# Patient Record
Sex: MALE | Race: BLACK OR AFRICAN AMERICAN | NOT HISPANIC OR LATINO
[De-identification: names, ages, dates, MRNs, and addresses within clinical notes are randomized per-mention and may not be internally consistent; named-entity substitution may affect disease eponyms.]

---

## 2021-11-19 ENCOUNTER — TRANSCRIPTION ENCOUNTER (OUTPATIENT)
Age: 56
End: 2021-11-19

## 2022-03-03 ENCOUNTER — INPATIENT (INPATIENT)
Facility: HOSPITAL | Age: 57
LOS: 4 days | Discharge: HOME | End: 2022-03-08
Attending: PSYCHIATRY & NEUROLOGY | Admitting: PSYCHIATRY & NEUROLOGY
Payer: COMMERCIAL

## 2022-03-03 VITALS
DIASTOLIC BLOOD PRESSURE: 87 MMHG | OXYGEN SATURATION: 99 % | WEIGHT: 227.96 LBS | TEMPERATURE: 98 F | RESPIRATION RATE: 18 BRPM | HEART RATE: 80 BPM | SYSTOLIC BLOOD PRESSURE: 161 MMHG

## 2022-03-03 LAB
A1C WITH ESTIMATED AVERAGE GLUCOSE RESULT: 8.6 % — HIGH (ref 4–5.6)
ALBUMIN SERPL ELPH-MCNC: 4.6 G/DL — SIGNIFICANT CHANGE UP (ref 3.5–5.2)
ALP SERPL-CCNC: 63 U/L — SIGNIFICANT CHANGE UP (ref 30–115)
ALT FLD-CCNC: 27 U/L — SIGNIFICANT CHANGE UP (ref 0–41)
ANION GAP SERPL CALC-SCNC: 13 MMOL/L — SIGNIFICANT CHANGE UP (ref 7–14)
APTT BLD: 31.6 SEC — SIGNIFICANT CHANGE UP (ref 27–39.2)
AST SERPL-CCNC: 24 U/L — SIGNIFICANT CHANGE UP (ref 0–41)
BASOPHILS # BLD AUTO: 0.03 K/UL — SIGNIFICANT CHANGE UP (ref 0–0.2)
BASOPHILS NFR BLD AUTO: 0.6 % — SIGNIFICANT CHANGE UP (ref 0–1)
BILIRUB SERPL-MCNC: 0.5 MG/DL — SIGNIFICANT CHANGE UP (ref 0.2–1.2)
BUN SERPL-MCNC: 26 MG/DL — HIGH (ref 10–20)
CALCIUM SERPL-MCNC: 9.6 MG/DL — SIGNIFICANT CHANGE UP (ref 8.5–10.1)
CHLORIDE SERPL-SCNC: 104 MMOL/L — SIGNIFICANT CHANGE UP (ref 98–110)
CHOLEST SERPL-MCNC: 158 MG/DL — SIGNIFICANT CHANGE UP
CO2 SERPL-SCNC: 26 MMOL/L — SIGNIFICANT CHANGE UP (ref 17–32)
CREAT SERPL-MCNC: 1.5 MG/DL — SIGNIFICANT CHANGE UP (ref 0.7–1.5)
EGFR: 54 ML/MIN/1.73M2 — LOW
EOSINOPHIL # BLD AUTO: 0.12 K/UL — SIGNIFICANT CHANGE UP (ref 0–0.7)
EOSINOPHIL NFR BLD AUTO: 2.4 % — SIGNIFICANT CHANGE UP (ref 0–8)
ESTIMATED AVERAGE GLUCOSE: 200 MG/DL — HIGH (ref 68–114)
GLUCOSE BLDC GLUCOMTR-MCNC: 227 MG/DL — HIGH (ref 70–99)
GLUCOSE BLDC GLUCOMTR-MCNC: 240 MG/DL — HIGH (ref 70–99)
GLUCOSE SERPL-MCNC: 203 MG/DL — HIGH (ref 70–99)
HCT VFR BLD CALC: 46.1 % — SIGNIFICANT CHANGE UP (ref 42–52)
HDLC SERPL-MCNC: 57 MG/DL — SIGNIFICANT CHANGE UP
HGB BLD-MCNC: 14.1 G/DL — SIGNIFICANT CHANGE UP (ref 14–18)
IMM GRANULOCYTES NFR BLD AUTO: 0.8 % — HIGH (ref 0.1–0.3)
INR BLD: 0.95 RATIO — SIGNIFICANT CHANGE UP (ref 0.65–1.3)
IRON SATN MFR SERPL: 22 % — SIGNIFICANT CHANGE UP (ref 15–50)
IRON SATN MFR SERPL: 76 UG/DL — SIGNIFICANT CHANGE UP (ref 35–150)
LIPID PNL WITH DIRECT LDL SERPL: 82 MG/DL — SIGNIFICANT CHANGE UP
LYMPHOCYTES # BLD AUTO: 1.76 K/UL — SIGNIFICANT CHANGE UP (ref 1.2–3.4)
LYMPHOCYTES # BLD AUTO: 35.1 % — SIGNIFICANT CHANGE UP (ref 20.5–51.1)
MCHC RBC-ENTMCNC: 20.4 PG — LOW (ref 27–31)
MCHC RBC-ENTMCNC: 30.6 G/DL — LOW (ref 32–37)
MCV RBC AUTO: 66.6 FL — LOW (ref 80–94)
MONOCYTES # BLD AUTO: 0.58 K/UL — SIGNIFICANT CHANGE UP (ref 0.1–0.6)
MONOCYTES NFR BLD AUTO: 11.6 % — HIGH (ref 1.7–9.3)
NEUTROPHILS # BLD AUTO: 2.49 K/UL — SIGNIFICANT CHANGE UP (ref 1.4–6.5)
NEUTROPHILS NFR BLD AUTO: 49.5 % — SIGNIFICANT CHANGE UP (ref 42.2–75.2)
NON HDL CHOLESTEROL: 101 MG/DL — SIGNIFICANT CHANGE UP
NRBC # BLD: 0 /100 WBCS — SIGNIFICANT CHANGE UP (ref 0–0)
PLATELET # BLD AUTO: 216 K/UL — SIGNIFICANT CHANGE UP (ref 130–400)
POTASSIUM SERPL-MCNC: 4.5 MMOL/L — SIGNIFICANT CHANGE UP (ref 3.5–5)
POTASSIUM SERPL-SCNC: 4.5 MMOL/L — SIGNIFICANT CHANGE UP (ref 3.5–5)
PROT SERPL-MCNC: 7.5 G/DL — SIGNIFICANT CHANGE UP (ref 6–8)
PROTHROM AB SERPL-ACNC: 10.9 SEC — SIGNIFICANT CHANGE UP (ref 9.95–12.87)
RBC # BLD: 6.92 M/UL — HIGH (ref 4.7–6.1)
RBC # FLD: 18.2 % — HIGH (ref 11.5–14.5)
SARS-COV-2 RNA SPEC QL NAA+PROBE: SIGNIFICANT CHANGE UP
SODIUM SERPL-SCNC: 143 MMOL/L — SIGNIFICANT CHANGE UP (ref 135–146)
TIBC SERPL-MCNC: 343 UG/DL — SIGNIFICANT CHANGE UP (ref 220–430)
TRIGL SERPL-MCNC: 93 MG/DL — SIGNIFICANT CHANGE UP
TROPONIN T SERPL-MCNC: 0.03 NG/ML — CRITICAL HIGH
TROPONIN T SERPL-MCNC: 0.03 NG/ML — CRITICAL HIGH
UIBC SERPL-MCNC: 267 UG/DL — SIGNIFICANT CHANGE UP (ref 110–370)
WBC # BLD: 5.02 K/UL — SIGNIFICANT CHANGE UP (ref 4.8–10.8)
WBC # FLD AUTO: 5.02 K/UL — SIGNIFICANT CHANGE UP (ref 4.8–10.8)

## 2022-03-03 PROCEDURE — 99291 CRITICAL CARE FIRST HOUR: CPT

## 2022-03-03 PROCEDURE — 70496 CT ANGIOGRAPHY HEAD: CPT | Mod: 26,MA

## 2022-03-03 PROCEDURE — 93010 ELECTROCARDIOGRAM REPORT: CPT

## 2022-03-03 PROCEDURE — 70498 CT ANGIOGRAPHY NECK: CPT | Mod: 26,MA

## 2022-03-03 PROCEDURE — 0042T: CPT

## 2022-03-03 PROCEDURE — 99284 EMERGENCY DEPT VISIT MOD MDM: CPT

## 2022-03-03 RX ORDER — DEXTROSE 50 % IN WATER 50 %
15 SYRINGE (ML) INTRAVENOUS ONCE
Refills: 0 | Status: DISCONTINUED | OUTPATIENT
Start: 2022-03-03 | End: 2022-03-05

## 2022-03-03 RX ORDER — SODIUM CHLORIDE 9 MG/ML
1000 INJECTION, SOLUTION INTRAVENOUS
Refills: 0 | Status: DISCONTINUED | OUTPATIENT
Start: 2022-03-03 | End: 2022-03-05

## 2022-03-03 RX ORDER — AMLODIPINE BESYLATE 2.5 MG/1
10 TABLET ORAL DAILY
Refills: 0 | Status: DISCONTINUED | OUTPATIENT
Start: 2022-03-03 | End: 2022-03-08

## 2022-03-03 RX ORDER — DEXTROSE 50 % IN WATER 50 %
25 SYRINGE (ML) INTRAVENOUS ONCE
Refills: 0 | Status: DISCONTINUED | OUTPATIENT
Start: 2022-03-03 | End: 2022-03-05

## 2022-03-03 RX ORDER — INSULIN GLARGINE 100 [IU]/ML
15 INJECTION, SOLUTION SUBCUTANEOUS AT BEDTIME
Refills: 0 | Status: DISCONTINUED | OUTPATIENT
Start: 2022-03-03 | End: 2022-03-03

## 2022-03-03 RX ORDER — INFLUENZA VIRUS VACCINE 15; 15; 15; 15 UG/.5ML; UG/.5ML; UG/.5ML; UG/.5ML
0.5 SUSPENSION INTRAMUSCULAR ONCE
Refills: 0 | Status: DISCONTINUED | OUTPATIENT
Start: 2022-03-03 | End: 2022-03-08

## 2022-03-03 RX ORDER — ATORVASTATIN CALCIUM 80 MG/1
80 TABLET, FILM COATED ORAL AT BEDTIME
Refills: 0 | Status: DISCONTINUED | OUTPATIENT
Start: 2022-03-03 | End: 2022-03-08

## 2022-03-03 RX ORDER — ALTEPLASE 100 MG
9 KIT INTRAVENOUS ONCE
Refills: 0 | Status: COMPLETED | OUTPATIENT
Start: 2022-03-03 | End: 2022-03-03

## 2022-03-03 RX ORDER — INSULIN GLARGINE 100 [IU]/ML
20 INJECTION, SOLUTION SUBCUTANEOUS AT BEDTIME
Refills: 0 | Status: DISCONTINUED | OUTPATIENT
Start: 2022-03-03 | End: 2022-03-04

## 2022-03-03 RX ORDER — GLUCAGON INJECTION, SOLUTION 0.5 MG/.1ML
1 INJECTION, SOLUTION SUBCUTANEOUS ONCE
Refills: 0 | Status: DISCONTINUED | OUTPATIENT
Start: 2022-03-03 | End: 2022-03-05

## 2022-03-03 RX ORDER — DEXTROSE 50 % IN WATER 50 %
12.5 SYRINGE (ML) INTRAVENOUS ONCE
Refills: 0 | Status: DISCONTINUED | OUTPATIENT
Start: 2022-03-03 | End: 2022-03-05

## 2022-03-03 RX ORDER — TAMSULOSIN HYDROCHLORIDE 0.4 MG/1
0.4 CAPSULE ORAL AT BEDTIME
Refills: 0 | Status: DISCONTINUED | OUTPATIENT
Start: 2022-03-03 | End: 2022-03-08

## 2022-03-03 RX ORDER — LISINOPRIL 2.5 MG/1
40 TABLET ORAL DAILY
Refills: 0 | Status: DISCONTINUED | OUTPATIENT
Start: 2022-03-03 | End: 2022-03-08

## 2022-03-03 RX ORDER — ALTEPLASE 100 MG
81 KIT INTRAVENOUS ONCE
Refills: 0 | Status: COMPLETED | OUTPATIENT
Start: 2022-03-03 | End: 2022-03-03

## 2022-03-03 RX ORDER — INSULIN LISPRO 100/ML
6 VIAL (ML) SUBCUTANEOUS
Refills: 0 | Status: DISCONTINUED | OUTPATIENT
Start: 2022-03-03 | End: 2022-03-04

## 2022-03-03 RX ORDER — PANTOPRAZOLE SODIUM 20 MG/1
40 TABLET, DELAYED RELEASE ORAL
Refills: 0 | Status: DISCONTINUED | OUTPATIENT
Start: 2022-03-03 | End: 2022-03-08

## 2022-03-03 RX ORDER — LABETALOL HCL 100 MG
10 TABLET ORAL ONCE
Refills: 0 | Status: COMPLETED | OUTPATIENT
Start: 2022-03-03 | End: 2022-03-03

## 2022-03-03 RX ORDER — ALLOPURINOL 300 MG
300 TABLET ORAL DAILY
Refills: 0 | Status: DISCONTINUED | OUTPATIENT
Start: 2022-03-03 | End: 2022-03-08

## 2022-03-03 RX ORDER — ACYCLOVIR SODIUM 500 MG
400 VIAL (EA) INTRAVENOUS
Refills: 0 | Status: DISCONTINUED | OUTPATIENT
Start: 2022-03-03 | End: 2022-03-08

## 2022-03-03 RX ORDER — INSULIN LISPRO 100/ML
VIAL (ML) SUBCUTANEOUS
Refills: 0 | Status: DISCONTINUED | OUTPATIENT
Start: 2022-03-03 | End: 2022-03-08

## 2022-03-03 RX ORDER — CHLORHEXIDINE GLUCONATE 213 G/1000ML
1 SOLUTION TOPICAL
Refills: 0 | Status: DISCONTINUED | OUTPATIENT
Start: 2022-03-03 | End: 2022-03-08

## 2022-03-03 RX ORDER — INSULIN LISPRO 100/ML
VIAL (ML) SUBCUTANEOUS
Refills: 0 | Status: DISCONTINUED | OUTPATIENT
Start: 2022-03-03 | End: 2022-03-03

## 2022-03-03 RX ADMIN — Medication 300 MILLIGRAM(S): at 21:51

## 2022-03-03 RX ADMIN — ALTEPLASE 540 MILLIGRAM(S): KIT at 09:11

## 2022-03-03 RX ADMIN — INSULIN GLARGINE 20 UNIT(S): 100 INJECTION, SOLUTION SUBCUTANEOUS at 23:19

## 2022-03-03 RX ADMIN — ALTEPLASE 81 MILLIGRAM(S): KIT at 09:12

## 2022-03-03 RX ADMIN — Medication 400 MILLIGRAM(S): at 21:51

## 2022-03-03 RX ADMIN — Medication 10 MILLIGRAM(S): at 09:00

## 2022-03-03 RX ADMIN — Medication 3: at 16:38

## 2022-03-03 RX ADMIN — Medication 6 UNIT(S): at 16:38

## 2022-03-03 RX ADMIN — TAMSULOSIN HYDROCHLORIDE 0.4 MILLIGRAM(S): 0.4 CAPSULE ORAL at 21:51

## 2022-03-03 RX ADMIN — ATORVASTATIN CALCIUM 80 MILLIGRAM(S): 80 TABLET, FILM COATED ORAL at 21:51
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
## 2022-03-03 NOTE — ED PROVIDER NOTE - PROGRESS NOTE DETAILS
PT SIGNED OUT TO DR. TOSCANO, FOLLOW UP LABS, CT SCANS, NEURO CONSULT, REASSESS AND DISPO. DR. KANDY CRUZ AT BEDSIDE DISCUSSING TPA WITH PT. Pt evaluated after sign out. Pt agreed to tPA. Bolus pushed, drip started. Will observe and admit.
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
## 2022-03-03 NOTE — H&P ADULT - NSHPLABSRESULTS_GEN_ALL_CORE
14.1   5.02  )-----------( 216      ( 03 Mar 2022 08:45 )             46.1       03-03    143  |  104  |  26<H>  ----------------------------<  203<H>  4.5   |  26  |  1.5    Ca    9.6      03 Mar 2022 08:45    TPro  7.5  /  Alb  4.6  /  TBili  0.5  /  DBili  x   /  AST  24  /  ALT  27  /  AlkPhos  63  03-03                  PT/INR - ( 03 Mar 2022 08:45 )   PT: 10.90 sec;   INR: 0.95 ratio         PTT - ( 03 Mar 2022 08:45 )  PTT:31.6 sec    CARDIAC MARKERS ( 03 Mar 2022 08:45 )  x     / 0.03 ng/mL / x     / x     / x            CAPILLARY BLOOD GLUCOSE  184 (03 Mar 2022 09:32)      POCT Blood Glucose.: 184 mg/dL (03 Mar 2022 08:37)

## 2022-03-03 NOTE — H&P ADULT - ASSESSMENT
56 year old male with past medical history of HTN, DLD, DM presented with left arm weakness since 7 15am.    Impression  Possible ischemic stroke s/p tpa  H/O HTN  H/O DLD  H/O DM    Plan    Neurological:  - Neuro Checks as per post tpa protocol  - Continue Atorvastatin 80 daily  - Will start Aspirin from tomorrow  - Maintain BP<160  - PT/OT/Physiatry/Speech eval  - Stroke labs: TSH, Lipid profile, A1c    Cardiovascular:  - SBP goal: Less than 160  - Normovolemia  - Echo with bubble study ordered  - Tele monitoring    Pulmonary:  - HOB 45  - Keep Sat 92-96%    GI:  - SLP  - Diet: DASH/Carb consistent once cleared from SLP  - Bowel Regimen: Senna daily    :  - Strict Is/Os    Electrolytes:  - Replete as needed.  - Mg > 2  - POCT, ISS,  -180    ID:  - Trend Fever curve and WBC  - Normothermia    Hematology:  - SCDs for now  - Can resume chemical prophylaxis tomorrow      Code Status: Full code    Dispo: Neurocritical care    Discussed with Attending Physician:         56 year old male with past medical history of HTN, DLD, DM presented with left arm weakness since 7 15am.    Impression  Possible ischemic stroke s/p tpa  H/O HTN  H/O DLD  H/O DM    Plan    Neurological:  - Neuro Checks as per post tpa protocol  - Continue Atorvastatin 80 daily  - Will start Aspirin from tomorrow  - Maintain BP<160  - PT/OT/Physiatry/Speech eval  - Stroke labs: TSH, Lipid profile, A1c    Cardiovascular:  - SBP goal: Less than 180  - Normovolemia  - Echo with bubble study ordered  - troponin 0.03, will trend  - Tele monitoring    Pulmonary:  - HOB 45  - Keep Sat 92-96%    GI:  - SLP  - Diet: DASH/Carb consistent  - Bowel Regimen: Senna daily    :  - Strict Is/Os    Electrolytes:  - Replete as needed  - Mg > 2    Endo  - Will give Insulin 15 units basal and sliding scale  - POCT, ISS,  -180    ID:  - Trend Fever curve and WBC  - Normothermia    Hematology:  - SCDs for now  - Can resume chemical prophylaxis tomorrow    Misc:  Peripheral IVs    Code Status: Full code    Dispo: Neurocritical care    Discussed with Attending Physician: Dr. Meyers         56 year old male with past medical history of HTN, DLD, DM presented with left arm weakness since 7 15am.    Impression  Possible ischemic stroke s/p tpa  H/O HTN  H/O DLD  H/O DM    Plan    Neurological:  - Neuro Checks as per post tpa protocol  - Continue Atorvastatin 80 daily  - Will start Aspirin from tomorrow  - Maintain BP<180  - PT/OT/Physiatry/Speech eval  - Stroke labs: TSH, Lipid profile, A1c    Cardiovascular:  - SBP goal: Less than 180  - Normovolemia  - Echo with bubble study ordered  - troponin 0.03, will trend  - Tele monitoring    Pulmonary:  - HOB 45  - Keep Sat 92-96%    GI:  - SLP  - Diet: DASH/Carb consistent  - Bowel Regimen: Senna daily    :  - Strict Is/Os    Electrolytes:  - Replete as needed  - Mg > 2    Endo  - Will give Insulin 15 units basal and sliding scale  - POCT, ISS,  -180    ID:  - Trend Fever curve and WBC  - Normothermia    Hematology:  - SCDs for now  - Low MCV, will get iron studies but possible thalassemia trait  - Can resume chemical prophylaxis tomorrow    Misc:  Peripheral IVs    Code Status: Full code    Dispo: Neurocritical care    Discussed with Attending Physician: Dr. Meyers         56 year old male with past medical history of HTN, DLD, DM presented with left arm weakness since 7 15am.    Impression  Possible ischemic stroke s/p tpa  H/O HTN  H/O DLD  H/O DM    Plan    Neurological:  - Neuro Checks as per post tpa protocol  - Continue Atorvastatin 80 daily  - Will start Aspirin from tomorrow  - Maintain BP<180  - PT/OT/Physiatry/Speech eval  - Stroke labs: TSH, Lipid profile, A1c  - MRI Head later    Cardiovascular:  - SBP goal: Less than 180  - Normovolemia  - Echo with bubble study ordered  - troponin 0.03, will trend  - Tele monitoring    Pulmonary:  - HOB 45  - Keep Sat 92-96%    GI:  - Diet: DASH/Carb consistent  - Bowel Regimen: Senna daily    :  - Strict Is/Os    Electrolytes:  - Replete as needed  - Mg > 2, K >4    Endo  - Will give Insulin 15 units basal and sliding scale  - POCT, ISS,  -180    ID:  - Trend Fever curve and WBC  - Normothermia    Hematology:  - SCDs for now  - Low MCV, will get iron studies but possible thalassemia trait  - Can resume chemical prophylaxis tomorrow    Misc:  Peripheral IVs    Code Status: Full code    Dispo: Neurocritical care    Discussed with Attending Physician: Dr. Meyers         56 year old male with past medical history of HTN, DLD, DM presented with left arm weakness since 0715.    Impression  Possible ischemic stroke s/p tpa  H/O HTN  H/O DLD  H/O DM II  Diabetic Neuropathy    Plan    Neurological:  - Neuro Checks as per post tpa protocol  - Continue Atorvastatin 80 daily  - Will start Aspirin from tomorrow  - Maintain BP<180  - PT/OT/Physiatry/Speech eval  - Stroke labs: TSH, Lipid profile, A1c  - MRI Head later    Cardiovascular:  - SBP goal: Less than 180  - Will resume home Lisinopril and Amlodipine  - Will hold HCTZ  - Normovolemia  - Echo with bubble study ordered  - troponin 0.03, will trend  - Tele monitoring    Pulmonary:  - HOB 45  - Keep Sat 92-96%    GI:  - Diet: DASH/Carb consistent  - Bowel Regimen: Senna daily    :  - Strict Is/Os    Electrolytes:  - Replete as needed  - Mg > 2, K >4    Endo  - Will give Insulin 15 units basal and sliding scale  - Was on Insulin 40 units at home in the past (stopped himself)  - POCT, ISS,  -180    ID:  - Trend Fever curve and WBC  - Normothermia    Hematology:  - SCDs for now  - Low MCV, will get iron studies but possible thalassemia trait  - Can resume chemical prophylaxis tomorrow    Misc:  Peripheral IVs    Code Status: Full code    Dispo: Neurocritical care    Discussed with Attending Physician: Dr. Luigi Garcia rec done with doctors office         56 year old male with past medical history of HTN, DLD, DM presented with left arm weakness since 0715.    Impression  Possible ischemic stroke s/p tpa  H/O HTN  H/O DLD  H/O DM II  Diabetic Neuropathy    Plan    Neurological:  - Neuro Checks as per post tpa protocol  - Continue Atorvastatin 80 daily  - Will start Aspirin from tomorrow  - Maintain BP<180  - PT/OT/Physiatry/Speech eval  - Stroke labs: TSH, Lipid profile, A1c  - MRI Head later    Cardiovascular:  - SBP goal: Less than 180  - Will resume home Lisinopril and Amlodipine  - Will hold HCTZ  - Normovolemia  - Echo with bubble study ordered  - troponin 0.03, will trend  - Tele monitoring    Pulmonary:  - HOB 45  - Keep Sat 92-96%    GI:  - Diet: DASH/Carb consistent  - Bowel Regimen: Senna daily    :  - Strict Is/Os    Electrolytes:  - Replete as needed  - Mg > 2, K >4    Endo  - Will give Insulin 15 units basal and sliding scale  - Was on Insulin 40 units at home in the past (stopped himself)  - POCT, ISS,  -180  - Continue Allopurinol for gout    ID:  - Trend Fever curve and WBC  - Normothermia  - Continue Acyclovir prophylaxis    Hematology:  - SCDs for now  - Low MCV, will get iron studies but possible thalassemia trait  - Can resume chemical prophylaxis tomorrow    Misc:  Peripheral IVs    Code Status: Full code    Dispo: Neurocritical care    Discussed with Attending Physician: Dr. Luigi Garcia rec done with doctors office

## 2022-03-03 NOTE — PATIENT PROFILE ADULT - FALL HARM RISK - UNIVERSAL INTERVENTIONS
Bed in lowest position, wheels locked, appropriate side rails in place/Call bell, personal items and telephone in reach/Instruct patient to call for assistance before getting out of bed or chair/Non-slip footwear when patient is out of bed/[place] to call system/Physically safe environment - no spills, clutter or unnecessary equipment/Purposeful Proactive Rounding/Room/bathroom lighting operational, light cord in reach

## 2022-03-03 NOTE — ED PROVIDER NOTE - PHYSICAL EXAMINATION
CONSTITUTIONAL: well-developed, well-nourished, in no acute distress  SKIN: warm, dry  HEAD: Normocephalic atraumatic  EYES: no conjunctival erythema EOMI PEERLA 3mm b/l  ENT: no nasal discharge, airway clear  NECK: full ROM  CARD: regular rate and rhythm  RESP: normal respiratory effort, no wheezes, rales or rhonchi  ABD: soft, non-distended, non-tender  EXT: moving all extremities spontaneously  NEURO: alert and oriented x4, left UE can lift against gravity but not against resistance, ambulated to ED, right UE and LE wnl, no left LE drift, no facial droop, CN2-12 wnl  PSYCH: cooperative, appropriate
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
## 2022-03-03 NOTE — SWALLOW BEDSIDE ASSESSMENT ADULT - SLP PERTINENT HISTORY OF CURRENT PROBLEM
56 year old male with past medical history of HTN, DLD, DM, right eye blindness (traumatic), diabetic neuropathy presented with left arm weakness. +Stroke code activated and CTH (-) for hemorrhage, CTA Head negative for large vessel occlusion, tpa administered and patient admitted to Neurocritical care unit.

## 2022-03-03 NOTE — H&P ADULT - NSHPPHYSICALEXAM_GEN_ALL_CORE
GENERAL: NAD, lying in bed comfortably  HEAD:  Atraumatic, Normocephalic  EYES: EOMI, PERRLA, conjunctiva and sclera clear  ENT: Moist mucous membranes  NECK: Supple, No JVD  CHEST/LUNG: Clear to auscultation bilaterally; No rales, rhonchi, wheezing, or rubs. Unlabored respirations  HEART: Regular rate and rhythm; No murmurs, rubs, or gallops  ABDOMEN: Bowel sounds present; Soft, Nontender, Nondistended. No hepatomegally  EXTREMITIES:  2+ Peripheral Pulses, brisk capillary refill. No clubbing, cyanosis, or edema  NERVOUS SYSTEM:  Alert & Oriented X3, speech clear. No deficits   MSK: FROM all 4 extremities, full and equal strength  SKIN: No rashes or lesions GENERAL: NAD, lying in bed comfortably  HEAD:  Atraumatic, Normocephalic  CHEST/LUNG: Clear to auscultation bilaterally;   HEART: Regular rate and rhythm  ABDOMEN: Bowel sounds present; Soft, Nontender, Nondistended  EXTREMITIES: No Peripheral edema  NERVOUS SYSTEM:  Alert & Oriented X3, speech clear. Motor 5/5 in all extremities except left hand  reduced strength, no facial droop, eye movements intact, no drift, sensory intact  MSK: FROM all 4 extremities, full and equal strength  SKIN: No rashes or lesions
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
## 2022-03-03 NOTE — ED PROVIDER NOTE - NS ED ROS FT
Constitutional: No fever   Eyes:  No visual changes  Ears:  No hearing changes  Neck: No neck pain  Cardiac:  No chest pain  Respiratory:  No SOB   GI:  No abdominal pain, nausea, or vomiting  :  No dysuria  MS:  No back pain  Neuro:  No headache No LOC +left UE weakness  Skin:  No skin rash

## 2022-03-03 NOTE — ED PROVIDER NOTE - ATTENDING CONTRIBUTION TO CARE
I personally evaluated the patient. I reviewed the Resident’s or Physician Assistant’s note (as assigned above), and agree with the findings and plan except as documented in my note.  56-year-old man with past medical history significant for hypertension, dyslipidemia, diabetes, R handed complaining of acute onset of left arm weakness while driving.  Symptom onset at 7:15 AM today.  Patient denies headache, nausea, dizziness.  Patient denies vision changes or speech changes.  Patient denies left leg weakness.  Patient denies left arm paresthesias.  Patient is non-smoker.  No chest pain, shortness of breath, palpitations.  Vitals noted.   CONSTITUTIONAL: Well-appearing; well-nourished; in no apparent distress.   HEAD: Normocephalic; atraumatic.   EYES: R eye ptosis *(chronic, prior surgery), L pupil round and reactive. EOMI.   ENT: Normal pharynx with no tonsillar hypertrophy. MMM.  NECK: Supple; non-tender; no cervical lymphadenopathy.   CHEST: Normal chest excursion with respiration.   CARDIOVASCULAR: Normal S1, S2; no murmurs, rubs, or gallops.   RESPIRATORY: Normal chest excursion with respiration; breath sounds clear and equal bilaterally; no wheezes, rhonchi, or rales.  GI/: Normal bowel sounds; non-distended; non-tender.  BACK: No evidence of trauma or deformity. Non-tender to palpation. No CVA tenderness.   EXT: Normal ROM in all four extremities; non-tender to palpation; distal pulses are normal. No leg edema B/L.   SKIN: Normal for age and race; warm; dry; good turgor.  NEURO: A & O x 4; CN 2-12 intact. No facial droop. 5/5 motor strength RUE, B/L LEs. 0/5 motor strength LUE. No sensory deficit. Normal R finger to nose. Normal speech.

## 2022-03-03 NOTE — SWALLOW BEDSIDE ASSESSMENT ADULT - SLP GENERAL OBSERVATIONS
pt received on ED stretcher asleep arousable w/o c/o pain. +room air +speech clear and fluent, +spouse at bedside

## 2022-03-04 LAB
A1C WITH ESTIMATED AVERAGE GLUCOSE RESULT: 8.8 % — HIGH (ref 4–5.6)
ANION GAP SERPL CALC-SCNC: 12 MMOL/L — SIGNIFICANT CHANGE UP (ref 7–14)
BUN SERPL-MCNC: 25 MG/DL — HIGH (ref 10–20)
CALCIUM SERPL-MCNC: 9.1 MG/DL — SIGNIFICANT CHANGE UP (ref 8.5–10.1)
CHLORIDE SERPL-SCNC: 106 MMOL/L — SIGNIFICANT CHANGE UP (ref 98–110)
CHOLEST SERPL-MCNC: 142 MG/DL — SIGNIFICANT CHANGE UP
CO2 SERPL-SCNC: 25 MMOL/L — SIGNIFICANT CHANGE UP (ref 17–32)
CREAT SERPL-MCNC: 1.7 MG/DL — HIGH (ref 0.7–1.5)
EGFR: 46 ML/MIN/1.73M2 — LOW
ESTIMATED AVERAGE GLUCOSE: 206 MG/DL — HIGH (ref 68–114)
GLUCOSE BLDC GLUCOMTR-MCNC: 168 MG/DL — HIGH (ref 70–99)
GLUCOSE BLDC GLUCOMTR-MCNC: 185 MG/DL — HIGH (ref 70–99)
GLUCOSE BLDC GLUCOMTR-MCNC: 230 MG/DL — HIGH (ref 70–99)
GLUCOSE BLDC GLUCOMTR-MCNC: 279 MG/DL — HIGH (ref 70–99)
GLUCOSE SERPL-MCNC: 200 MG/DL — HIGH (ref 70–99)
HCT VFR BLD CALC: 41.8 % — LOW (ref 42–52)
HCV AB S/CO SERPL IA: 0.04 COI — SIGNIFICANT CHANGE UP
HCV AB SERPL-IMP: SIGNIFICANT CHANGE UP
HDLC SERPL-MCNC: 52 MG/DL — SIGNIFICANT CHANGE UP
HGB BLD-MCNC: 12.7 G/DL — LOW (ref 14–18)
LIPID PNL WITH DIRECT LDL SERPL: 74 MG/DL — SIGNIFICANT CHANGE UP
MCHC RBC-ENTMCNC: 20.3 PG — LOW (ref 27–31)
MCHC RBC-ENTMCNC: 30.4 G/DL — LOW (ref 32–37)
MCV RBC AUTO: 66.7 FL — LOW (ref 80–94)
NON HDL CHOLESTEROL: 90 MG/DL — SIGNIFICANT CHANGE UP
NRBC # BLD: 0 /100 WBCS — SIGNIFICANT CHANGE UP (ref 0–0)
PHOSPHATE SERPL-MCNC: 4.6 MG/DL — SIGNIFICANT CHANGE UP (ref 2.1–4.9)
PLATELET # BLD AUTO: 214 K/UL — SIGNIFICANT CHANGE UP (ref 130–400)
POTASSIUM SERPL-MCNC: 4 MMOL/L — SIGNIFICANT CHANGE UP (ref 3.5–5)
POTASSIUM SERPL-SCNC: 4 MMOL/L — SIGNIFICANT CHANGE UP (ref 3.5–5)
RBC # BLD: 6.27 M/UL — HIGH (ref 4.7–6.1)
RBC # FLD: 17.7 % — HIGH (ref 11.5–14.5)
SODIUM SERPL-SCNC: 143 MMOL/L — SIGNIFICANT CHANGE UP (ref 135–146)
TRIGL SERPL-MCNC: 86 MG/DL — SIGNIFICANT CHANGE UP
TSH SERPL-MCNC: 0.47 UIU/ML — SIGNIFICANT CHANGE UP (ref 0.27–4.2)
WBC # BLD: 5.57 K/UL — SIGNIFICANT CHANGE UP (ref 4.8–10.8)
WBC # FLD AUTO: 5.57 K/UL — SIGNIFICANT CHANGE UP (ref 4.8–10.8)

## 2022-03-04 PROCEDURE — 99291 CRITICAL CARE FIRST HOUR: CPT

## 2022-03-04 PROCEDURE — 70450 CT HEAD/BRAIN W/O DYE: CPT | Mod: 26

## 2022-03-04 PROCEDURE — 93306 TTE W/DOPPLER COMPLETE: CPT | Mod: 26

## 2022-03-04 RX ORDER — SENNA PLUS 8.6 MG/1
2 TABLET ORAL AT BEDTIME
Refills: 0 | Status: DISCONTINUED | OUTPATIENT
Start: 2022-03-04 | End: 2022-03-08

## 2022-03-04 RX ORDER — INSULIN LISPRO 100/ML
8 VIAL (ML) SUBCUTANEOUS
Refills: 0 | Status: DISCONTINUED | OUTPATIENT
Start: 2022-03-04 | End: 2022-03-05

## 2022-03-04 RX ORDER — INSULIN LISPRO 100/ML
9 VIAL (ML) SUBCUTANEOUS
Refills: 0 | Status: DISCONTINUED | OUTPATIENT
Start: 2022-03-04 | End: 2022-03-04

## 2022-03-04 RX ORDER — HYDROCHLOROTHIAZIDE 25 MG
25 TABLET ORAL AT BEDTIME
Refills: 0 | Status: DISCONTINUED | OUTPATIENT
Start: 2022-03-04 | End: 2022-03-08

## 2022-03-04 RX ORDER — INSULIN GLARGINE 100 [IU]/ML
27 INJECTION, SOLUTION SUBCUTANEOUS AT BEDTIME
Refills: 0 | Status: DISCONTINUED | OUTPATIENT
Start: 2022-03-04 | End: 2022-03-04

## 2022-03-04 RX ORDER — INSULIN GLARGINE 100 [IU]/ML
25 INJECTION, SOLUTION SUBCUTANEOUS AT BEDTIME
Refills: 0 | Status: DISCONTINUED | OUTPATIENT
Start: 2022-03-04 | End: 2022-03-05

## 2022-03-04 RX ADMIN — Medication 2: at 17:07

## 2022-03-04 RX ADMIN — INSULIN GLARGINE 25 UNIT(S): 100 INJECTION, SOLUTION SUBCUTANEOUS at 23:26

## 2022-03-04 RX ADMIN — Medication 400 MILLIGRAM(S): at 08:08

## 2022-03-04 RX ADMIN — Medication 6 UNIT(S): at 12:14

## 2022-03-04 RX ADMIN — Medication 8 UNIT(S): at 17:08

## 2022-03-04 RX ADMIN — Medication 6 UNIT(S): at 07:57

## 2022-03-04 RX ADMIN — PANTOPRAZOLE SODIUM 40 MILLIGRAM(S): 20 TABLET, DELAYED RELEASE ORAL at 07:55

## 2022-03-04 RX ADMIN — ATORVASTATIN CALCIUM 80 MILLIGRAM(S): 80 TABLET, FILM COATED ORAL at 21:49

## 2022-03-04 RX ADMIN — AMLODIPINE BESYLATE 10 MILLIGRAM(S): 2.5 TABLET ORAL at 08:08

## 2022-03-04 RX ADMIN — TAMSULOSIN HYDROCHLORIDE 0.4 MILLIGRAM(S): 0.4 CAPSULE ORAL at 21:48

## 2022-03-04 RX ADMIN — Medication 25 MILLIGRAM(S): at 21:49

## 2022-03-04 RX ADMIN — SENNA PLUS 2 TABLET(S): 8.6 TABLET ORAL at 21:48

## 2022-03-04 RX ADMIN — CHLORHEXIDINE GLUCONATE 1 APPLICATION(S): 213 SOLUTION TOPICAL at 05:27

## 2022-03-04 RX ADMIN — Medication 400 MILLIGRAM(S): at 17:16

## 2022-03-04 RX ADMIN — Medication 300 MILLIGRAM(S): at 12:08

## 2022-03-04 RX ADMIN — LISINOPRIL 40 MILLIGRAM(S): 2.5 TABLET ORAL at 12:08

## 2022-03-04 RX ADMIN — Medication 2: at 07:57

## 2022-03-04 RX ADMIN — Medication 4: at 12:14

## 2022-03-04 NOTE — OCCUPATIONAL THERAPY INITIAL EVALUATION ADULT - PERTINENT HX OF CURRENT PROBLEM, REHAB EVAL
56 year old male with past medical history of HTN, DLD, DM, right eye blindness (traumatic), diabetic neuropathy, gout, bilateral wrist surgeries, genital herpes presented with left arm weakness since 7 15am.

## 2022-03-04 NOTE — OCCUPATIONAL THERAPY INITIAL EVALUATION ADULT - GENERAL OBSERVATIONS, REHAB EVAL
Pt received semi orozco in bed in [facility], agreeable to OT eval, +tele, +BP cuff, +pulse oxi, spouse at bedside, +IV lock, left seated in b/s chair in [facility] vitals stable call bell in reach.

## 2022-03-04 NOTE — PROGRESS NOTE ADULT - ASSESSMENT
56 year old male with past medical history of HTN, DLD, DM presented with left arm weakness since 0715.    Impression  Possible ischemic stroke s/p tpa  H/O HTN  H/O DLD  H/O DM II  Diabetic Neuropathy    Plan    Neurological:  - Neuro Checks as per post tpa protocol  - Continue Atorvastatin 80 daily  - Will start Aspirin from tomorrow  - Maintain BP<180  - PT/OT/Physiatry/Speech eval  - Stroke labs: TSH, Lipid profile, A1c  - MRI Head later    Cardiovascular:  - SBP goal: Less than 180  - Will resume home Lisinopril and Amlodipine  - Will hold HCTZ  - Normovolemia  - Echo with bubble study ordered  - troponin 0.03, will trend  - Tele monitoring    Pulmonary:  - HOB 45  - Keep Sat 92-96%    GI:  - Diet: DASH/Carb consistent  - Bowel Regimen: Senna daily    :  - Strict Is/Os    Electrolytes:  - Replete as needed  - Mg > 2, K >4    Endo  - Will give Insulin 15 units basal and sliding scale  - Was on Insulin 40 units at home in the past (stopped himself)  - POCT, ISS,  -180  - Continue Allopurinol for gout    ID:  - Trend Fever curve and WBC  - Normothermia  - Continue Acyclovir prophylaxis    Hematology:  - SCDs for now  - Low MCV, will get iron studies but possible thalassemia trait  - Can resume chemical prophylaxis tomorrow    Misc:  Peripheral IVs    Code Status: Full code    Dispo: Neurocritical care   56 year old male with past medical history of HTN, DLD, DM presented with left arm weakness since 0715.    Impression  Possible ischemic stroke s/p tpa  H/O HTN  H/O DLD  H/O DM II  Diabetic Neuropathy    Plan    Neurological:  - Neuro Checks as per post tpa protocol  - Continue Atorvastatin 80 daily  - Will start Aspirin from today after CT Head  - Maintain BP<180  - PT/OT/Physiatry/Speech eval  - Stroke labs: TSH, Lipid profile, A1c  - MRI Head later    Cardiovascular:  - SBP goal: Less than 180  - Continue home dose Lisinopril and Amlodipine  - Will hold HCTZ  - Normovolemia  - Echo with bubble study ordered  - troponin 0.03, repeat 0.03, no chest pain reported  - Tele monitoring    Pulmonary:  - HOB 45  - Keep Sat 92-96%  - Monitor off oxygen    GI:  - Diet: DASH/Carb consistent  - Bowel Regimen: Senna daily    :  - Strict Is/Os  - Baseline Cr 1.6, Now Cr 1.7  - Monitor Creatinine    Electrolytes:  - Replete as needed  - Mg > 2, K >4    Endo  - Continue Insulin 20 units basal with 6 with meals and sliding scale  - Was on Insulin 40 units at home in the past (stopped himself)  - POCT, ISS,  -180  - Continue Allopurinol for gout    ID:  - Trend Fever curve and WBC  - Normothermia  - Continue Acyclovir prophylaxis for herpes recurrence    Hematology:  - SCDs for now  - Low MCV, normal iron studies but possible thalassemia trait  - Can resume chemical prophylaxis today    Misc:  Peripheral IVs    Code Status: Full code    Dispo: Neurocritical care   56 year old male with past medical history of HTN, DLD, DM presented with left arm weakness since 0715.    Impression  Possible ischemic stroke s/p tpa  H/O HTN  H/O DLD  H/O DM II  Diabetic Neuropathy    Plan    Neurological:  - Neuro Checks as per post tpa protocol  - If CT head negative will reduce neurochecks to q4  - Continue Atorvastatin 80 daily  - Will start Aspirin from today after CT Head  - Maintain BP<180  - PT/OT/Physiatry/Speech eval  - Stroke labs: TSH, Lipid profile LDL 74, A1c 8  - MRI Head ordered    Cardiovascular:  - SBP goal: Less than 180  - Continue home dose Lisinopril and Amlodipine  - Will resume HCTZ today  - Normovolemia  - Echo with bubble study ordered  - troponin 0.03, repeat 0.03, no chest pain reported  - Tele monitoring    Pulmonary:  - HOB 45  - Keep Sat 92-96%  - Monitor off oxygen    GI:  - Diet: DASH/Carb consistent  - Bowel Regimen: Senna daily    :  - Strict Is/Os  - Baseline Cr 1.6, Now Cr 1.7  - Monitor Creatinine    Electrolytes:  - Replete as needed  - Mg > 2, K >4    Endo  - Continue Insulin 20 units basal with 6 with meals and sliding scale  - Was on Insulin 40 units at home in the past (stopped himself)  - POCT, ISS,  -180  - Continue Allopurinol for gout    ID:  - Trend Fever curve and WBC  - Normothermia  - Continue Acyclovir prophylaxis for herpes recurrence    Hematology:  - SCDs for now  - Low MCV, normal iron studies but possible thalassemia trait  - Can resume chemical prophylaxis today    Misc:  Peripheral IVs    Code Status: Full code    Dispo: Neurocritical care. Downgrade to Stroke unit if CT Head negative   56 year old male with past medical history of HTN, DLD, DM presented with left arm weakness since 0715.    Impression  Possible ischemic stroke s/p tpa  H/O HTN  H/O DLD  H/O DM II  Diabetic Neuropathy    Plan    Neurological:  - Neuro Checks as per post tpa protocol  - If CT head negative will reduce neurochecks to q4  - Continue Atorvastatin 80 daily  - Will start Aspirin from today after CT Head  - Maintain BP<180  - PT/OT/Physiatry/Speech eval  - Stroke labs: TSH, Lipid profile LDL 74, A1c 8  - MRI Head ordered    Cardiovascular:  - SBP goal: Less than 180  - Continue home dose Lisinopril and Amlodipine  - Will resume HCTZ today. Will increase to 25.  - Target goal at discharge less than 130  - Normovolemia  - Echo with bubble study ordered  - troponin 0.03, repeat 0.03, no chest pain reported  - Tele monitoring    Pulmonary:  - HOB 45  - Keep Sat 92-96%  - Monitor off oxygen    GI:  - Diet: DASH/Carb consistent  - Bowel Regimen: Senna daily    :  - Strict Is/Os  - Baseline Cr 1.6, Now Cr 1.7  - Monitor Creatinine    Electrolytes:  - Replete as needed  - Mg > 2, K >4    Endo  - Continue Insulin 20 units basal with 6 with meals and sliding scale  - Was on Insulin 40 units at home in the past (stopped himself)  - POCT, ISS,  -180  - Continue Allopurinol for gout    ID:  - Trend Fever curve and WBC  - Normothermia  - Continue Acyclovir prophylaxis for herpes recurrence    Hematology:  - SCDs for now  - Low MCV, normal iron studies but possible thalassemia trait  - Can resume chemical prophylaxis today    Misc:  Peripheral IVs    Code Status: Full code    Dispo: Neurocritical care. Downgrade to Stroke unit if CT Head negative   56 year old male with past medical history of HTN, DLD, DM presented with left arm weakness since 0715.    Impression  Possible ischemic stroke s/p tpa  H/O HTN  H/O DLD  H/O DM II  Diabetic Neuropathy    Plan    Neurological:  - Neuro Checks as per post tpa protocol  - If CT head negative will reduce neurochecks to q4  - Continue Atorvastatin 80 daily  - Will start Aspirin from today after CT Head  - Maintain BP<180  - PT/OT/Physiatry/Speech eval  - Stroke labs: TSH, Lipid profile LDL 74, A1c 8  - MRI Head ordered    Cardiovascular:  - SBP goal: Less than 180  - Continue home dose Lisinopril and Amlodipine  - Will resume HCTZ today. Will increase to 25.  - Target goal at discharge less than 130  - Normovolemia  - Echo with bubble study ordered  - troponin 0.03, repeat 0.03, no chest pain reported  - Tele monitoring    Pulmonary:  - HOB 45  - Keep Sat 92-96%  - Monitor off oxygen    GI:  - Diet: DASH/Carb consistent  - Bowel Regimen: Senna daily    :  - Strict Is/Os  - Baseline Cr 1.6, Now Cr 1.7  - Monitor Creatinine    Electrolytes:  - Replete as needed  - Mg > 2, K >4    Endo  - Will increase insulin to 27/9/9/9 and sliding scale  - Carb consistent diet  - Was on Insulin 40 units at home in the past (stopped himself)  - POCT, ISS,  -180  - Continue Allopurinol for gout    ID:  - Trend Fever curve and WBC  - Normothermia  - Continue Acyclovir prophylaxis for herpes recurrence    Hematology:  - SCDs for now  - Low MCV, normal iron studies but possible thalassemia trait  - Can resume chemical prophylaxis today    Misc:  Peripheral IVs    Code Status: Full code    Dispo: Neurocritical care. Downgrade to Stroke unit if CT Head negative   56 year old male with past medical history of HTN, DLD, DM presented with left arm weakness since 0715.    Impression  Possible ischemic stroke s/p tpa  H/O HTN  H/O DLD  H/O DM II  Diabetic Neuropathy    Plan    Neurological:  - Neuro Checks as per post tpa protocol  - If CT head negative will reduce neurochecks to q4  - Continue Atorvastatin 80 daily  - Will start Aspirin from today after CT Head  - Maintain BP<180  - PT/OT/Physiatry/Speech eval  - Stroke labs: TSH, Lipid profile LDL 74, A1c 8  - MRI Head ordered    Cardiovascular:  - SBP goal: Less than 180  - Continue home dose Lisinopril and Amlodipine  - Will resume HCTZ today. Will increase to 25.  - Target goal at discharge less than 130  - Normovolemia  - Echo with bubble study ordered  - troponin 0.03, repeat 0.03, no chest pain reported  - Tele monitoring    Pulmonary:  - HOB 45  - Keep Sat 92-96%  - Monitor off oxygen    GI:  - Diet: DASH/Carb consistent  - Bowel Regimen: Senna daily    :  - Strict Is/Os  - Baseline Cr 1.6, Now Cr 1.7  - Monitor Creatinine    Electrolytes:  - Replete as needed  - Mg > 2, K >4    Endo  - Will increase insulin to 25/8/8/8 and sliding scale  - Carb consistent diet  - Was on Insulin 40 units at home in the past (stopped himself)  - POCT, ISS,  -180  - Continue Allopurinol for gout    ID:  - Trend Fever curve and WBC  - Normothermia  - Continue Acyclovir prophylaxis for herpes recurrence    Hematology:  - SCDs for now  - Low MCV, normal iron studies but possible thalassemia trait  - Can resume chemical prophylaxis today    Misc:  Peripheral IVs    Code Status: Full code    Dispo: Neurocritical care. Downgrade to Stroke unit if CT Head negative

## 2022-03-04 NOTE — PROGRESS NOTE ADULT - SUBJECTIVE AND OBJECTIVE BOX
SUMMARY: 56 year old male with past medical history of HTN, DLD, DM, right eye blindness (traumatic), diabetic neuropathy, gout, bilateral wrist surgeries, genital herpes presented with left arm weakness since 7 15am.    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 He pulled the car to the side and was later was send to ED on advice of the support staff as the arm did not get better. He denies any slurred speech, vision loss, loss of consciousness, leg weakness, chest pain, abdominal pain, nausea, vomiting, diarrhea, constipation or any other complaints.    In ED patient hemodynamically stable, afebrile, stroke code activated and CT Head negative for any hemorrhage, CTA Head negative for any large vessel occlusion, tpa administered and patient admitted to Neurocritical care unit.     OVERNIGHT EVENTS:     ADMISSION SCORES:   NIHSS: 1    REVIEW OF SYSTEMS:    VITALS: [x] Reviewed    IMAGING/DATA: [x] Reviewed    IV FLUIDS/MEDICATIONS: [x] Reviewed    ALLERGIES: Allergies    Allergy Status Unknown    Intolerances        EXAMINATION:  General: No acute distress  HEENT: Anicteric sclerae  Cardiac: [id number]  Lungs: Clear  Abdomen: Soft, non-tender, +BS  Extremities: No c/c/e  Skin/Incision Site: Clean, dry and intact  Neurologic: Awake, alert, fully oriented, follows commands, PERRL, VFFtc, EOMI, face symmetric, tongue midline, no drift, full strength SUMMARY: 56 year old male with past medical history of HTN, DLD, DM, right eye blindness (traumatic), diabetic neuropathy, gout, bilateral wrist surgeries, genital herpes presented with left arm weakness since 7 15am.    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 He pulled the car to the side and was later was send to ED on advice of the support staff as the arm did not get better. He denies any slurred speech, vision loss, loss of consciousness, leg weakness, chest pain, abdominal pain, nausea, vomiting, diarrhea, constipation or any other complaints.    In ED patient hemodynamically stable, afebrile, stroke code activated and CT Head negative for any hemorrhage, CTA Head negative for any large vessel occlusion, tpa administered and patient admitted to Neurocritical care unit.     OVERNIGHT EVENTS: Stable with no events overnight.    ADMISSION SCORES:   NIHSS: 1    REVIEW OF SYSTEMS:    VITALS: [x] Reviewed    IMAGING/DATA: [x] Reviewed    IV FLUIDS/MEDICATIONS: [x] Reviewed    ALLERGIES: Allergies    Allergy Status Unknown    Intolerances        EXAMINATION:  General: No acute distress  HEENT: Anicteric sclerae  Cardiac: [id number]  Lungs: Clear  Abdomen: Soft, non-tender, +BS  Extremities: No c/c/e  Skin/Incision Site: Clean, dry and intact  Neurologic: Awake, alert, fully oriented, follows commands, PERRL, VFFtc, EOMI, face symmetric, tongue midline, no drift, full strength, left 4th and 5th finger flexed MRI of the abdomen

## 2022-03-05 LAB
ALBUMIN SERPL ELPH-MCNC: 3.9 G/DL — SIGNIFICANT CHANGE UP (ref 3.5–5.2)
ALP SERPL-CCNC: 56 U/L — SIGNIFICANT CHANGE UP (ref 30–115)
ALT FLD-CCNC: 20 U/L — SIGNIFICANT CHANGE UP (ref 0–41)
ANION GAP SERPL CALC-SCNC: 12 MMOL/L — SIGNIFICANT CHANGE UP (ref 7–14)
AST SERPL-CCNC: 17 U/L — SIGNIFICANT CHANGE UP (ref 0–41)
BILIRUB SERPL-MCNC: 0.5 MG/DL — SIGNIFICANT CHANGE UP (ref 0.2–1.2)
BUN SERPL-MCNC: 21 MG/DL — HIGH (ref 10–20)
CALCIUM SERPL-MCNC: 9 MG/DL — SIGNIFICANT CHANGE UP (ref 8.5–10.1)
CHLORIDE SERPL-SCNC: 103 MMOL/L — SIGNIFICANT CHANGE UP (ref 98–110)
CO2 SERPL-SCNC: 26 MMOL/L — SIGNIFICANT CHANGE UP (ref 17–32)
CREAT SERPL-MCNC: 1.4 MG/DL — SIGNIFICANT CHANGE UP (ref 0.7–1.5)
EGFR: 59 ML/MIN/1.73M2 — LOW
GLUCOSE BLDC GLUCOMTR-MCNC: 194 MG/DL — HIGH (ref 70–99)
GLUCOSE BLDC GLUCOMTR-MCNC: 211 MG/DL — HIGH (ref 70–99)
GLUCOSE BLDC GLUCOMTR-MCNC: 215 MG/DL — HIGH (ref 70–99)
GLUCOSE BLDC GLUCOMTR-MCNC: 268 MG/DL — HIGH (ref 70–99)
GLUCOSE SERPL-MCNC: 190 MG/DL — HIGH (ref 70–99)
HCT VFR BLD CALC: 43.7 % — SIGNIFICANT CHANGE UP (ref 42–52)
HGB BLD-MCNC: 13.1 G/DL — LOW (ref 14–18)
MAGNESIUM SERPL-MCNC: 2.3 MG/DL — SIGNIFICANT CHANGE UP (ref 1.8–2.4)
MCHC RBC-ENTMCNC: 20.2 PG — LOW (ref 27–31)
MCHC RBC-ENTMCNC: 30 G/DL — LOW (ref 32–37)
MCV RBC AUTO: 67.3 FL — LOW (ref 80–94)
NRBC # BLD: 0 /100 WBCS — SIGNIFICANT CHANGE UP (ref 0–0)
PHOSPHATE SERPL-MCNC: 4.3 MG/DL — SIGNIFICANT CHANGE UP (ref 2.1–4.9)
PLATELET # BLD AUTO: 213 K/UL — SIGNIFICANT CHANGE UP (ref 130–400)
POTASSIUM SERPL-MCNC: 4 MMOL/L — SIGNIFICANT CHANGE UP (ref 3.5–5)
POTASSIUM SERPL-SCNC: 4 MMOL/L — SIGNIFICANT CHANGE UP (ref 3.5–5)
PROT SERPL-MCNC: 6.3 G/DL — SIGNIFICANT CHANGE UP (ref 6–8)
RBC # BLD: 6.49 M/UL — HIGH (ref 4.7–6.1)
RBC # FLD: 17.6 % — HIGH (ref 11.5–14.5)
SARS-COV-2 RNA SPEC QL NAA+PROBE: SIGNIFICANT CHANGE UP
SODIUM SERPL-SCNC: 141 MMOL/L — SIGNIFICANT CHANGE UP (ref 135–146)
WBC # BLD: 5.31 K/UL — SIGNIFICANT CHANGE UP (ref 4.8–10.8)
WBC # FLD AUTO: 5.31 K/UL — SIGNIFICANT CHANGE UP (ref 4.8–10.8)

## 2022-03-05 PROCEDURE — 99291 CRITICAL CARE FIRST HOUR: CPT

## 2022-03-05 PROCEDURE — 70551 MRI BRAIN STEM W/O DYE: CPT | Mod: 26

## 2022-03-05 RX ORDER — INSULIN GLARGINE 100 [IU]/ML
27 INJECTION, SOLUTION SUBCUTANEOUS AT BEDTIME
Refills: 0 | Status: DISCONTINUED | OUTPATIENT
Start: 2022-03-05 | End: 2022-03-05

## 2022-03-05 RX ORDER — INSULIN GLARGINE 100 [IU]/ML
25 INJECTION, SOLUTION SUBCUTANEOUS AT BEDTIME
Refills: 0 | Status: DISCONTINUED | OUTPATIENT
Start: 2022-03-05 | End: 2022-03-06

## 2022-03-05 RX ORDER — INSULIN LISPRO 100/ML
9 VIAL (ML) SUBCUTANEOUS
Refills: 0 | Status: DISCONTINUED | OUTPATIENT
Start: 2022-03-05 | End: 2022-03-06

## 2022-03-05 RX ORDER — ASPIRIN/CALCIUM CARB/MAGNESIUM 324 MG
325 TABLET ORAL DAILY
Refills: 0 | Status: DISCONTINUED | OUTPATIENT
Start: 2022-03-05 | End: 2022-03-07

## 2022-03-05 RX ADMIN — AMLODIPINE BESYLATE 10 MILLIGRAM(S): 2.5 TABLET ORAL at 05:58

## 2022-03-05 RX ADMIN — Medication 400 MILLIGRAM(S): at 05:12

## 2022-03-05 RX ADMIN — Medication 325 MILLIGRAM(S): at 11:51

## 2022-03-05 RX ADMIN — CHLORHEXIDINE GLUCONATE 1 APPLICATION(S): 213 SOLUTION TOPICAL at 05:13

## 2022-03-05 RX ADMIN — Medication 2: at 06:07

## 2022-03-05 RX ADMIN — Medication 400 MILLIGRAM(S): at 17:12

## 2022-03-05 RX ADMIN — LISINOPRIL 40 MILLIGRAM(S): 2.5 TABLET ORAL at 05:12

## 2022-03-05 RX ADMIN — Medication 300 MILLIGRAM(S): at 11:25

## 2022-03-05 RX ADMIN — PANTOPRAZOLE SODIUM 40 MILLIGRAM(S): 20 TABLET, DELAYED RELEASE ORAL at 06:08

## 2022-03-05 RX ADMIN — INSULIN GLARGINE 25 UNIT(S): 100 INJECTION, SOLUTION SUBCUTANEOUS at 21:28

## 2022-03-05 RX ADMIN — Medication 9 UNIT(S): at 11:53

## 2022-03-05 RX ADMIN — Medication 25 MILLIGRAM(S): at 21:27

## 2022-03-05 RX ADMIN — Medication 9 UNIT(S): at 16:37

## 2022-03-05 RX ADMIN — Medication 3: at 11:02

## 2022-03-05 RX ADMIN — Medication 8 UNIT(S): at 06:08

## 2022-03-05 RX ADMIN — Medication 3: at 16:36

## 2022-03-05 RX ADMIN — ATORVASTATIN CALCIUM 80 MILLIGRAM(S): 80 TABLET, FILM COATED ORAL at 21:28

## 2022-03-05 RX ADMIN — TAMSULOSIN HYDROCHLORIDE 0.4 MILLIGRAM(S): 0.4 CAPSULE ORAL at 21:27

## 2022-03-05 NOTE — PROGRESS NOTE ADULT - SUBJECTIVE AND OBJECTIVE BOX
SUMMARY: 56 year old male with past medical history of HTN, DLD, DM, right eye blindness (traumatic), diabetic neuropathy, gout, bilateral wrist surgeries, genital herpes presented with left arm weakness since 7 15am.    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 He pulled the car to the side and was later was send to ED on advice of the support staff as the arm did not get better. He denies any slurred speech, vision loss, loss of consciousness, leg weakness, chest pain, abdominal pain, nausea, vomiting, diarrhea, constipation or any other complaints.    In ED patient hemodynamically stable, afebrile, stroke code activated and CT Head negative for any hemorrhage, CTA Head negative for any large vessel occlusion, tpa administered and patient admitted to Neurocritical care unit.     OVERNIGHT EVENTS: Post-TPA CT Head showed two new focal hypodensities, one in the region of the caudate nucleus and one in the high right frontal region, representing new small focal infarcts.       ADMISSION SCORES:   NIHSS: 1    REVIEW OF SYSTEMS:    VITALS: [x] Reviewed    IMAGING/DATA: [x] Reviewed    IV FLUIDS/MEDICATIONS: [x] Reviewed    ALLERGIES: Allergies    Allergy Status Unknown    Intolerances        EXAMINATION:  General: No acute distress  HEENT: Anicteric sclerae  Cardiac: [id number]  Lungs: Clear  Abdomen: Soft, non-tender, +BS  Extremities: No c/c/e  Skin/Incision Site: Clean, dry and intact  Neurologic: Awake, alert, fully oriented, follows commands, PERRL, VFFtc, EOMI, face symmetric, tongue midline, no drift, full strength, left 4th and 5th finger flexed SUMMARY: 56 year old male with past medical history of HTN, DLD, DM, right eye blindness (traumatic), diabetic neuropathy, gout, bilateral wrist surgeries, genital herpes presented with left arm weakness since 7 15am.    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 He pulled the car to the side and was later was send to ED on advice of the support staff as the arm did not get better. He denies any slurred speech, vision loss, loss of consciousness, leg weakness, chest pain, abdominal pain, nausea, vomiting, diarrhea, constipation or any other complaints.    In ED patient hemodynamically stable, afebrile, stroke code activated and CT Head negative for any hemorrhage, CTA Head negative for any large vessel occlusion, tpa administered and patient admitted to Neurocritical care unit.     OVERNIGHT EVENTS: Post-TPA CT Head showed two new focal hypodensities, one in the region of the right caudate nucleus and one in the high right frontal region, representing new small focal infarcts.       ADMISSION SCORES:   NIHSS: 1    REVIEW OF SYSTEMS:    VITALS: [x] Reviewed    IMAGING/DATA: [x] Reviewed    IV FLUIDS/MEDICATIONS: [x] Reviewed    ALLERGIES: Allergies    Allergy Status Unknown    Intolerances        EXAMINATION:  General: No acute distress  HEENT: Anicteric sclerae  Cardiac: [id number]  Lungs: Clear  Abdomen: Soft, non-tender, +BS  Extremities: No c/c/e  Skin/Incision Site: Clean, dry and intact  Neurologic: Awake, alert, fully oriented, follows commands, PERRL, VFFtc, EOMI, face symmetric, tongue midline, no drift, full strength, left 4th and 5th finger flexed

## 2022-03-05 NOTE — CHART NOTE - NSCHARTNOTEFT_GEN_A_CORE
Neuro Critical Care Transfer Note    Transfer from:  [  ] ICU  [  ] CCU  [X] Neuro ICU  [  ] Medicine  [  ] Telemetry  [  ] Stroke Unit  [  ] Step Down Unit    Transfer to:    [  ] ICU  [  ] CCU  [  ] Medicine  [  ] Telemetry  [X] Stroke Unit  [  ] Step Down Unit    NCCU COURSE:  Patient is a 55yo male with PMH of HTN, DLD, DM type 2, right eye blindness (traumatic), diabetic neuropathy, gout, bilateral wrist surgeries, and genital herpes who presented to the hospital with left arm weakness since 7 15am. Code Stroke was activated in the ED. CT head was negative for any hemorrhage, and CT angio head was negative for any large vessel occlusion. TPA was administered and patient was admitted to the neurocritical care unit for post-TPA monitoring. Post-TPA CT head showed two new focal hypo-densities, one in the region of the right caudate nucleus and one in the high right frontal region, representing new small focal infarcts. Patient still endorses some weakness in the left 4/5th digits, which has improved since presentation. Given new CT findings, the patient will be downgraded to the stroke unit with Q4H neuro checks. Etiology of the stroke is still unknown. Cardiology was consulted for ROMINA, which will be scheduled for Monday.    For Follow-Up:  - Patient scheduled for ROMINA on Monday morning  - NPO after Sunday night  - Pre-operative labs ordered  - Follow official read of MRI head  - Monitor fingerstick glucose. Insulin glargine was increased to 27 units and pre-meal insulin lispro was increased to 9 units TID

## 2022-03-05 NOTE — CHART NOTE - NSCHARTNOTEFT_GEN_A_CORE
56 year old male with past medical history of HTN, DLD, DM presented with left arm weakness since 0715. s/p TPA 3/3.  CT H with small focal infarcts R caudate nucleus and R frontal region.   Neuroicu requesting ROMINA.       Pt denied any known esophageal issues,  active gib,  cervical spine issues, loose teeth.    discussed risks/benefits of procedure with pt and wife (over phone) -- both seem amenable to procedure.    Pt consentable.      PLAN:    Will need repeat covid swab  (last done 3/3).  >> repeat today.   Will add on for ROMINA monday 3/7.  NPO after mn sunday.

## 2022-03-05 NOTE — PROGRESS NOTE ADULT - ASSESSMENT
56 year old male with past medical history of HTN, DLD, DM presented with left arm weakness since 0715.    Impression  Possible ischemic stroke s/p tpa  H/O HTN  H/O DLD  H/O DM II  Diabetic Neuropathy    Plan    Neurological:  - Neuro Checks as per post tpa protocol  - If CT head negative will reduce neurochecks to q4  - Continue Atorvastatin 80 daily  - Will start Aspirin from today after CT Head  - Maintain BP<180  - PT/OT/Physiatry/Speech eval  - Stroke labs: TSH, Lipid profile LDL 74, A1c 8  - MRI Head ordered    Cardiovascular:  - SBP goal: Less than 180  - Continue home dose Lisinopril and Amlodipine  - Will resume HCTZ today. Will increase to 25.  - Target goal at discharge less than 130  - Normovolemia  - Echo with bubble study ordered  - troponin 0.03, repeat 0.03, no chest pain reported  - Tele monitoring    Pulmonary:  - HOB 45  - Keep Sat 92-96%  - Monitor off oxygen    GI:  - Diet: DASH/Carb consistent  - Bowel Regimen: Senna daily    :  - Strict Is/Os  - Baseline Cr 1.6, Now Cr 1.7  - Monitor Creatinine    Electrolytes:  - Replete as needed  - Mg > 2, K >4    Endo  - Will increase insulin to 25/8/8/8 and sliding scale  - Carb consistent diet  - Was on Insulin 40 units at home in the past (stopped himself)  - POCT, ISS,  -180  - Continue Allopurinol for gout    ID:  - Trend Fever curve and WBC  - Normothermia  - Continue Acyclovir prophylaxis for herpes recurrence    Hematology:  - SCDs for now  - Low MCV, normal iron studies but possible thalassemia trait  - Can resume chemical prophylaxis today    Misc:  Peripheral IVs    Code Status: Full code    Dispo: Neurocritical care. Downgrade to Stroke unit if CT Head negative   56 year old male with past medical history of HTN, DLD, DM presented with left arm weakness since 0715.    Impression  Possible ischemic stroke s/p tpa  H/O HTN  H/O DLD  H/O DM II  Diabetic Neuropathy    Plan    Neurological:  - CT Head post-TPA significant for two focal infarcts  - Neuro checks q4  - Continue Atorvastatin 80 daily  - Will start Aspirin today 325 daily  - Maintain BP<180  - PT/OT/Physiatry/Speech eval  - Stroke labs: TSH, Lipid profile LDL 74, A1c 8  - MRI Head with 2 new infarcts  - Downgrade to stroke unit 3E    Cardiovascular:  - SBP goal: Less than 180  - Continue home dose Lisinopril and Amlodipine  - HCTZ resumed yesterday  - Target goal at discharge less than 130  - Normovolemia  - Echo with bubble study normal  - ROMINA scheduled for Monday 3/7/22, NPO after midnight Sunday  - troponin 0.03, repeat 0.03, no chest pain reported  - Tele monitoring    Pulmonary:  - HOB 45  - Keep Sat 92-96%  - Monitor off oxygen    GI:  - Diet: DASH/Carb consistent  - Bowel Regimen: Senna daily    :  - Strict Is/Os  - Baseline Cr 1.6, Now Cr 1.7  - Monitor Creatinine    Electrolytes:  - Replete as needed  - Mg > 2, K >4    Endo  - Will increase insulin to 25/9/9/9 and sliding scale  - Carb consistent diet  - Was on Insulin 40 units at home in the past (stopped himself)  - POCT, ISS,  -180  - Continue Allopurinol for gout    ID:  - Trend Fever curve and WBC  - Normothermia  - Continue Acyclovir prophylaxis for herpes recurrence    Hematology:  - SCDs for now  - Low MCV, normal iron studies but possible thalassemia trait  - Can resume chemical prophylaxis today    Misc:  Peripheral IVs    Code Status: Full code    Dispo: Downgrade to stroke unit

## 2022-03-06 LAB
ALBUMIN SERPL ELPH-MCNC: 3.8 G/DL — SIGNIFICANT CHANGE UP (ref 3.5–5.2)
ALP SERPL-CCNC: 57 U/L — SIGNIFICANT CHANGE UP (ref 30–115)
ALT FLD-CCNC: 23 U/L — SIGNIFICANT CHANGE UP (ref 0–41)
ANION GAP SERPL CALC-SCNC: 10 MMOL/L — SIGNIFICANT CHANGE UP (ref 7–14)
AST SERPL-CCNC: 23 U/L — SIGNIFICANT CHANGE UP (ref 0–41)
BILIRUB SERPL-MCNC: 0.4 MG/DL — SIGNIFICANT CHANGE UP (ref 0.2–1.2)
BUN SERPL-MCNC: 24 MG/DL — HIGH (ref 10–20)
CALCIUM SERPL-MCNC: 8.8 MG/DL — SIGNIFICANT CHANGE UP (ref 8.5–10.1)
CHLORIDE SERPL-SCNC: 104 MMOL/L — SIGNIFICANT CHANGE UP (ref 98–110)
CO2 SERPL-SCNC: 26 MMOL/L — SIGNIFICANT CHANGE UP (ref 17–32)
CREAT SERPL-MCNC: 1.5 MG/DL — SIGNIFICANT CHANGE UP (ref 0.7–1.5)
EGFR: 54 ML/MIN/1.73M2 — LOW
GLUCOSE BLDC GLUCOMTR-MCNC: 182 MG/DL — HIGH (ref 70–99)
GLUCOSE BLDC GLUCOMTR-MCNC: 215 MG/DL — HIGH (ref 70–99)
GLUCOSE BLDC GLUCOMTR-MCNC: 249 MG/DL — HIGH (ref 70–99)
GLUCOSE BLDC GLUCOMTR-MCNC: 251 MG/DL — HIGH (ref 70–99)
GLUCOSE SERPL-MCNC: 234 MG/DL — HIGH (ref 70–99)
HCT VFR BLD CALC: 43.3 % — SIGNIFICANT CHANGE UP (ref 42–52)
HGB BLD-MCNC: 13.1 G/DL — LOW (ref 14–18)
MAGNESIUM SERPL-MCNC: 2.4 MG/DL — SIGNIFICANT CHANGE UP (ref 1.8–2.4)
MCHC RBC-ENTMCNC: 20.2 PG — LOW (ref 27–31)
MCHC RBC-ENTMCNC: 30.3 G/DL — LOW (ref 32–37)
MCV RBC AUTO: 66.8 FL — LOW (ref 80–94)
NRBC # BLD: 0 /100 WBCS — SIGNIFICANT CHANGE UP (ref 0–0)
PHOSPHATE SERPL-MCNC: 3.8 MG/DL — SIGNIFICANT CHANGE UP (ref 2.1–4.9)
PLATELET # BLD AUTO: 217 K/UL — SIGNIFICANT CHANGE UP (ref 130–400)
POTASSIUM SERPL-MCNC: 4.2 MMOL/L — SIGNIFICANT CHANGE UP (ref 3.5–5)
POTASSIUM SERPL-SCNC: 4.2 MMOL/L — SIGNIFICANT CHANGE UP (ref 3.5–5)
PROT SERPL-MCNC: 6.1 G/DL — SIGNIFICANT CHANGE UP (ref 6–8)
RBC # BLD: 6.48 M/UL — HIGH (ref 4.7–6.1)
RBC # FLD: 18.1 % — HIGH (ref 11.5–14.5)
SODIUM SERPL-SCNC: 140 MMOL/L — SIGNIFICANT CHANGE UP (ref 135–146)
WBC # BLD: 5.84 K/UL — SIGNIFICANT CHANGE UP (ref 4.8–10.8)
WBC # FLD AUTO: 5.84 K/UL — SIGNIFICANT CHANGE UP (ref 4.8–10.8)

## 2022-03-06 PROCEDURE — 99223 1ST HOSP IP/OBS HIGH 75: CPT

## 2022-03-06 PROCEDURE — 99233 SBSQ HOSP IP/OBS HIGH 50: CPT

## 2022-03-06 RX ORDER — INSULIN LISPRO 100/ML
12 VIAL (ML) SUBCUTANEOUS
Refills: 0 | Status: DISCONTINUED | OUTPATIENT
Start: 2022-03-06 | End: 2022-03-08

## 2022-03-06 RX ORDER — HEPARIN SODIUM 5000 [USP'U]/ML
5000 INJECTION INTRAVENOUS; SUBCUTANEOUS EVERY 8 HOURS
Refills: 0 | Status: DISCONTINUED | OUTPATIENT
Start: 2022-03-06 | End: 2022-03-08

## 2022-03-06 RX ORDER — INSULIN GLARGINE 100 [IU]/ML
30 INJECTION, SOLUTION SUBCUTANEOUS AT BEDTIME
Refills: 0 | Status: DISCONTINUED | OUTPATIENT
Start: 2022-03-06 | End: 2022-03-08

## 2022-03-06 RX ORDER — GABAPENTIN 400 MG/1
100 CAPSULE ORAL EVERY 8 HOURS
Refills: 0 | Status: DISCONTINUED | OUTPATIENT
Start: 2022-03-06 | End: 2022-03-08

## 2022-03-06 RX ADMIN — GABAPENTIN 100 MILLIGRAM(S): 400 CAPSULE ORAL at 13:52

## 2022-03-06 RX ADMIN — HEPARIN SODIUM 5000 UNIT(S): 5000 INJECTION INTRAVENOUS; SUBCUTANEOUS at 13:52

## 2022-03-06 RX ADMIN — LISINOPRIL 40 MILLIGRAM(S): 2.5 TABLET ORAL at 06:14

## 2022-03-06 RX ADMIN — Medication 3: at 07:34

## 2022-03-06 RX ADMIN — Medication 2: at 16:32

## 2022-03-06 RX ADMIN — Medication 400 MILLIGRAM(S): at 06:14

## 2022-03-06 RX ADMIN — Medication 25 MILLIGRAM(S): at 21:47

## 2022-03-06 RX ADMIN — Medication 3: at 11:33

## 2022-03-06 RX ADMIN — HEPARIN SODIUM 5000 UNIT(S): 5000 INJECTION INTRAVENOUS; SUBCUTANEOUS at 21:48

## 2022-03-06 RX ADMIN — TAMSULOSIN HYDROCHLORIDE 0.4 MILLIGRAM(S): 0.4 CAPSULE ORAL at 21:47

## 2022-03-06 RX ADMIN — ATORVASTATIN CALCIUM 80 MILLIGRAM(S): 80 TABLET, FILM COATED ORAL at 21:47

## 2022-03-06 RX ADMIN — CHLORHEXIDINE GLUCONATE 1 APPLICATION(S): 213 SOLUTION TOPICAL at 06:14

## 2022-03-06 RX ADMIN — Medication 12 UNIT(S): at 16:32

## 2022-03-06 RX ADMIN — Medication 9 UNIT(S): at 07:34

## 2022-03-06 RX ADMIN — PANTOPRAZOLE SODIUM 40 MILLIGRAM(S): 20 TABLET, DELAYED RELEASE ORAL at 06:13

## 2022-03-06 RX ADMIN — INSULIN GLARGINE 30 UNIT(S): 100 INJECTION, SOLUTION SUBCUTANEOUS at 21:48

## 2022-03-06 RX ADMIN — Medication 400 MILLIGRAM(S): at 17:13

## 2022-03-06 RX ADMIN — AMLODIPINE BESYLATE 10 MILLIGRAM(S): 2.5 TABLET ORAL at 06:14

## 2022-03-06 RX ADMIN — GABAPENTIN 100 MILLIGRAM(S): 400 CAPSULE ORAL at 21:47

## 2022-03-06 RX ADMIN — Medication 300 MILLIGRAM(S): at 11:04

## 2022-03-06 RX ADMIN — Medication 9 UNIT(S): at 11:33

## 2022-03-06 RX ADMIN — Medication 325 MILLIGRAM(S): at 11:04

## 2022-03-06 NOTE — PROGRESS NOTE ADULT - SUBJECTIVE AND OBJECTIVE BOX
Stroke Progress Note:    MELANIE PIKE    Interval History: Patient examined by the bedside.     1. Chief Complaint:    HPI:  56 year old male with past medical history of HTN, DLD, DM, right eye blindness (traumatic), diabetic neuropathy, gout, bilateral wrist surgeries, genital herpes presented with left arm weakness since 7 15am.    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 He pulled the car to the side and was later was send to ED on advice of the support staff as the arm did not get better. He denies any slurred speech, vision loss, loss of consciousness, leg weakness, chest pain, abdominal pain, nausea, vomiting, diarrhea, constipation or any other complaints.    In ED patient hemodynamically stable, afebrile, stroke code activated and CT Head negative for any hemorrhage, CTA Head negative for any large vessel occlusion, tpa administered and patient admitted to Neurocritical care unit.     NIH Score on admission: 1   Current NIH Score: 0 (03 Mar 2022 09:42)      2. Relevant PMH:   Prior ischemic stroke/TIA[ ], Afib [ ], CAD [ ], HTN [ ], DLD [ ], DM [ ], PVD [ ], Obesity [ ],   Sedentary lifestyle [ ], CHF [ ], CAROLINA [ ], Cancer Hx [ ].    3. Social History: Smoking [ ], Drug Use [ ], Alcohol Use [ ], Other [ ]    4. Possible Location of Stroke:    5. Relevant Brain Tissue Imaging:  < from: CT Brain Stroke Protocol (22 @ 08:50) >    IMPRESSION:    No evidence of acute intracranial hemorrhage or large territory infarct.  6. Relevant Cerebrovascular Imaging:   CT Angio Neck w/ IV Cont:   ACC: [id number] EXAM:  CT ANGIO BRAIN (W)AW IC                        ACC: [id number] EXAM:  CT ANGIO NECK (W)AW IC                        ACC: [id number] EXAM:  CT PERFUSION W MAPS IC                          PROCEDURE DATE:  2022          INTERPRETATION:  Clinical History / Reason for exam: Stroke code. Left   arm weakness.    Technique: CT angiogram of the head and neck including perfusion study of   the brain.  Contiguous CT axial images of the head and neck were obtained   following the bolus intravenous administration of 120 cc Optiray with   multiple 3-D and MIP reformats with perfusion mapping performed on a   separate 3D workstation (RAPID).    Correlation made with accompanying noncontrast head CT.    Findings:    CTA neck:  The visualized aortic arch and great vessel origins demonstrate mild   calcific plaque without significant stenosis.    The right common, internal and external carotid arteries are patent.   There are mild irregular atheromatous changes of the right commonand   internal carotid artery without significant stenosis.    The left common, internal and external carotid arteries are patent. There   are scattered atheromatous changes of the left common and internal   carotid arteries without significant stenosis.    The vertebral arteries are patent, mildly dominant on the right.    CTA brain: Calcific plaque of the carotid siphons with mild stenoses.   There is a 3 mm posteromedially directed outpouching arising from the   communicating segment of the left ICA compatible with an aneurysm versus   infundibulum of the posterior communicating artery origin. The anterior   and middle cerebral arteries are patent.    The distal vertebral arteries are patent. The basilar artery is patent.   Hypoplastic versus stenotic P1 segment of the right PCA. The P2 segments   of bilateral PCAs are patent.    Perfusion:  There is no evidence of focal perfusion deficit to suggest   acute cerebral ischemia.    Other: Enlarged heterogeneous multinodular thyroid gland likely   reflecting a goiter. Shrunken calcified right globe consistent with   phthisis bulbi. Mild to moderate degenerative changes of the cervical   spine.    IMPRESSION:    1.  CT angiogram: No evidence of acute large vessel occlusion.   Hypoplastic vs stenotic P1 segment of the right PCA.  Scattered non   flow-limiting atheromatous changes as above. 3 mm aneurysm vs   infundibulum of the left p-Comm origin.    2.  CT perfusion: Normal perfusion images.    3.  Other: Enlarged heterogeneous multinodular thyroid gland likely   reflecting a goiter.    --- End of Report ---  7. Relevant blood tests:                          13.1   5.84  )-----------( 217      ( 06 Mar 2022 07:26 )             43.3   03-06    140  |  104  |  24<H>  ----------------------------<  234<H>  4.2   |  26  |  1.5    Ca    8.8      06 Mar 2022 07:26  Phos  3.8     03-06  Mg     2.4     03-06    TPro  6.1  /  Alb  3.8  /  TBili  0.4  /  DBili  x   /  AST  23  /  ALT  23  /  AlkPhos  57  03-06    8. Relevant cardiac rhythm monitorin. Relevant Cardiac Structure: (TTE/ROMINA +/-):[ ]No intracardiac thrombus/[ ] no vegetation/[ ]no akynesia/EF:    Home Medications:  acyclovir 400 mg oral tablet: 1 tab(s) orally 2 times a day (03 Mar 2022 16:07)  allopurinol 300 mg oral tablet: 1 tab(s) orally once a day (03 Mar 2022 16:07)  amLODIPine 10 mg oral tablet: 1 tab(s) orally once a day (03 Mar 2022 16:07)  atorvastatin 40 mg oral tablet: 1 tab(s) orally once a day (03 Mar 2022 16:07)  hydroCHLOROthiazide 12.5 mg oral capsule: 1 cap(s) orally once a day (03 Mar 2022 16:07)  lisinopril 40 mg oral tablet: 1 tab(s) orally once a day (03 Mar 2022 16:07)  pioglitazone 30 mg oral tablet: 1 tab(s) orally once a day (03 Mar 2022 16:07)  repaglinide 2 mg oral tablet: 1 tab(s) orally 3 times a day (before meals) (03 Mar 2022 16:07)  Synjardy 12.5 mg-1000 mg oral tablet: 1 tab(s) orally 2 times a day (03 Mar 2022 16:07)  tamsulosin 0.4 mg oral capsule: 1 cap(s) orally once a day (03 Mar 2022 16:07)      MEDICATIONS  (STANDING):  acyclovir   Oral Tab/Cap 400 milliGRAM(s) Oral two times a day  allopurinol 300 milliGRAM(s) Oral daily  amLODIPine   Tablet 10 milliGRAM(s) Oral daily  aspirin enteric coated 325 milliGRAM(s) Oral daily  atorvastatin 80 milliGRAM(s) Oral at bedtime  chlorhexidine 4% Liquid 1 Application(s) Topical <User Schedule>  hydrochlorothiazide 25 milliGRAM(s) Oral at bedtime  influenza   Vaccine 0.5 milliLiter(s) IntraMuscular once  insulin glargine Injectable (LANTUS) 25 Unit(s) SubCutaneous at bedtime  insulin lispro (ADMELOG) corrective regimen sliding scale   SubCutaneous three times a day before meals  insulin lispro Injectable (ADMELOG) 9 Unit(s) SubCutaneous three times a day before meals  lisinopril 40 milliGRAM(s) Oral daily  pantoprazole    Tablet 40 milliGRAM(s) Oral before breakfast  senna 2 Tablet(s) Oral at bedtime  tamsulosin 0.4 milliGRAM(s) Oral at bedtime      10. PT/OT/Speech/Rehab/S&Sw/ Cognitive eval results and recommendations:    11. Exam:    Vital Signs Last 24 Hrs  T(C): 35.9 (05 Mar 2022 17:35), Max: 36 (05 Mar 2022 14:13)  T(F): 96.7 (05 Mar 2022 17:35), Max: 96.8 (05 Mar 2022 14:13)  HR: 73 (06 Mar 2022 05:27) (72 - 93)  BP: 130/72 (06 Mar 2022 05:27) (122/79 - 166/93)  BP(mean): 93 (06 Mar 2022 05:27) (88 - 115)  RR: 18 (06 Mar 2022 05:27) (18 - 25)  SpO2: 96% (06 Mar 2022 05:27) (96% - 100%)    12.   NIH STROKE SCALE  Item	                                                        Score  1 a.	Level of Consciousness	               	0  1 b. LOC Questions	                                0  1 c.	LOC Commands	                               	0  2.	Best Gaze	                                        0  3.	Visual	                                                0  4.	Facial Palsy	                                        0  5 a.	Motor Arm - Left	                                0  5 b.	Motor Arm - Right	                        0  6 a.	Motor Leg - Left	                                0  6 b.	Motor Leg - Right	                                0  7.	Limb Ataxia	                                        0  8.	Sensory	                                                0  9.	Language	                                        0  10.	Dysarthria	                                        0  11.	Extinction and Inattention  	        0  ______________________________________  TOTAL	                                                        0    Total NIHSS on admission:      NIHSS yesterday:      NIHSS today: 0    < from: MR Head No Cont (22 @ 09:33) >  IMPRESSION:    Small acute infarcts in the right caudate and high right precentral   gyrus. No intracranial hemorrhage, midline shift, or extra-axial fluid   collection.     Stroke Progress Note:    MELANIE PIKE    Interval History: Patient examined by the bedside.     1. Chief Complaint:    HPI:  56 year old male with past medical history of HTN, DLD, DM, right eye blindness (traumatic), diabetic neuropathy, gout, bilateral wrist surgeries, genital herpes presented with left arm weakness since 7 15am.    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 He pulled the car to the side and was later was send to ED on advice of the support staff as the arm did not get better. He denies any slurred speech, vision loss, loss of consciousness, leg weakness, chest pain, abdominal pain, nausea, vomiting, diarrhea, constipation or any other complaints.    In ED patient hemodynamically stable, afebrile, stroke code activated and CT Head negative for any hemorrhage, CTA Head negative for any large vessel occlusion, tpa administered and patient admitted to Neurocritical care unit.     NIH Score on admission: 1   Current NIH Score: 0 (03 Mar 2022 09:42)    2. Relevant PMH:   Prior ischemic stroke/TIA[ ], Afib [ ], CAD [ ], HTN [ ], DLD [ ], DM [ ], PVD [ ], Obesity [ ],   Sedentary lifestyle [ ], CHF [ ], CAROLINA [ ], Cancer Hx [ ].    3. Social History: Smoking [ ], Drug Use [ ], Alcohol Use [ ], Other [ ]    4. Possible Location of Stroke:    5. Relevant Brain Tissue Imaging:  < from: CT Brain Stroke Protocol (22 @ 08:50) >    IMPRESSION:    No evidence of acute intracranial hemorrhage or large territory infarct.  6. Relevant Cerebrovascular Imaging:   CT Angio Neck w/ IV Cont:   ACC: [id number] EXAM:  CT ANGIO BRAIN (W)AW IC                        ACC: [id number] EXAM:  CT ANGIO NECK (W)AW IC                        ACC: [id number] EXAM:  CT PERFUSION W MAPS IC                          PROCEDURE DATE:  2022      INTERPRETATION:  Clinical History / Reason for exam: Stroke code. Left   arm weakness.    Technique: CT angiogram of the head and neck including perfusion study of   the brain.  Contiguous CT axial images of the head and neck were obtained   following the bolus intravenous administration of 120 cc Optiray with   multiple 3-D and MIP reformats with perfusion mapping performed on a   separate 3D workstation (RAPID).    Correlation made with accompanying noncontrast head CT.    Findings:    CTA neck:  The visualized aortic arch and great vessel origins demonstrate mild   calcific plaque without significant stenosis.    The right common, internal and external carotid arteries are patent.   There are mild irregular atheromatous changes of the right commonand   internal carotid artery without significant stenosis.    The left common, internal and external carotid arteries are patent. There   are scattered atheromatous changes of the left common and internal   carotid arteries without significant stenosis.    The vertebral arteries are patent, mildly dominant on the right.    CTA brain: Calcific plaque of the carotid siphons with mild stenoses.   There is a 3 mm posteromedially directed outpouching arising from the   communicating segment of the left ICA compatible with an aneurysm versus   infundibulum of the posterior communicating artery origin. The anterior   and middle cerebral arteries are patent.    The distal vertebral arteries are patent. The basilar artery is patent.   Hypoplastic versus stenotic P1 segment of the right PCA. The P2 segments   of bilateral PCAs are patent.    Perfusion:  There is no evidence of focal perfusion deficit to suggest   acute cerebral ischemia.    Other: Enlarged heterogeneous multinodular thyroid gland likely   reflecting a goiter. Shrunken calcified right globe consistent with   phthisis bulbi. Mild to moderate degenerative changes of the cervical   spine.    IMPRESSION:    1.  CT angiogram: No evidence of acute large vessel occlusion.   Hypoplastic vs stenotic P1 segment of the right PCA.  Scattered non   flow-limiting atheromatous changes as above. 3 mm aneurysm vs   infundibulum of the left p-Comm origin.    2.  CT perfusion: Normal perfusion images.    3.  Other: Enlarged heterogeneous multinodular thyroid gland likely   reflecting a goiter.    --- End of Report ---  7. Relevant blood tests:                          13.1   5.84  )-----------( 217      ( 06 Mar 2022 07:26 )             43.3   03-06    140  |  104  |  24<H>  ----------------------------<  234<H>  4.2   |  26  |  1.5    Ca    8.8      06 Mar 2022 07:26  Phos  3.8     03-06  Mg     2.4     03-06    TPro  6.1  /  Alb  3.8  /  TBili  0.4  /  DBili  x   /  AST  23  /  ALT  23  /  AlkPhos  57  03-06    8. Relevant cardiac rhythm monitorin. Relevant Cardiac Structure: (TTE/ROMINA +/-):[ ]No intracardiac thrombus/[ ] no vegetation/[ ]no akynesia/EF:    Home Medications:  acyclovir 400 mg oral tablet: 1 tab(s) orally 2 times a day (03 Mar 2022 16:07)  allopurinol 300 mg oral tablet: 1 tab(s) orally once a day (03 Mar 2022 16:07)  amLODIPine 10 mg oral tablet: 1 tab(s) orally once a day (03 Mar 2022 16:07)  atorvastatin 40 mg oral tablet: 1 tab(s) orally once a day (03 Mar 2022 16:07)  hydroCHLOROthiazide 12.5 mg oral capsule: 1 cap(s) orally once a day (03 Mar 2022 16:07)  lisinopril 40 mg oral tablet: 1 tab(s) orally once a day (03 Mar 2022 16:07)  pioglitazone 30 mg oral tablet: 1 tab(s) orally once a day (03 Mar 2022 16:07)  repaglinide 2 mg oral tablet: 1 tab(s) orally 3 times a day (before meals) (03 Mar 2022 16:07)  Synjardy 12.5 mg-1000 mg oral tablet: 1 tab(s) orally 2 times a day (03 Mar 2022 16:07)  tamsulosin 0.4 mg oral capsule: 1 cap(s) orally once a day (03 Mar 2022 16:07)      MEDICATIONS  (STANDING):  acyclovir   Oral Tab/Cap 400 milliGRAM(s) Oral two times a day  allopurinol 300 milliGRAM(s) Oral daily  amLODIPine   Tablet 10 milliGRAM(s) Oral daily  aspirin enteric coated 325 milliGRAM(s) Oral daily  atorvastatin 80 milliGRAM(s) Oral at bedtime  chlorhexidine 4% Liquid 1 Application(s) Topical <User Schedule>  hydrochlorothiazide 25 milliGRAM(s) Oral at bedtime  influenza   Vaccine 0.5 milliLiter(s) IntraMuscular once  insulin glargine Injectable (LANTUS) 25 Unit(s) SubCutaneous at bedtime  insulin lispro (ADMELOG) corrective regimen sliding scale   SubCutaneous three times a day before meals  insulin lispro Injectable (ADMELOG) 9 Unit(s) SubCutaneous three times a day before meals  lisinopril 40 milliGRAM(s) Oral daily  pantoprazole    Tablet 40 milliGRAM(s) Oral before breakfast  senna 2 Tablet(s) Oral at bedtime  tamsulosin 0.4 milliGRAM(s) Oral at bedtime      10. PT/OT/Speech/Rehab/S&Sw/ Cognitive eval results and recommendations:    11. Exam:    Vital Signs Last 24 Hrs  T(C): 35.9 (05 Mar 2022 17:35), Max: 36 (05 Mar 2022 14:13)  T(F): 96.7 (05 Mar 2022 17:35), Max: 96.8 (05 Mar 2022 14:13)  HR: 73 (06 Mar 2022 05:27) (72 - 93)  BP: 130/72 (06 Mar 2022 05:27) (122/79 - 166/93)  BP(mean): 93 (06 Mar 2022 05:27) (88 - 115)  RR: 18 (06 Mar 2022 05:27) (18 - 25)  SpO2: 96% (06 Mar 2022 05:27) (96% - 100%)    12.   NIH STROKE SCALE  Item	                                                        Score  1 a.	Level of Consciousness	               	0  1 b. LOC Questions	                                0  1 c.	LOC Commands	                               	0  2.	Best Gaze	                                        0  3.	Visual	                                                0  4.	Facial Palsy	                                        0  5 a.	Motor Arm - Left	                                1  5 b.	Motor Arm - Right	                        0  6 a.	Motor Leg - Left	                                0  6 b.	Motor Leg - Right	                                0  7.	Limb Ataxia	                                        1  8.	Sensory	                                                0  9.	Language	                                        0  10.	Dysarthria	                                        0  11.	Extinction and Inattention  	        0  ______________________________________  TOTAL	                                                        2    Total NIHSS on admission:  1    NIHSS yesterday:      NIHSS today: 2    < from: MR Head No Cont (22 @ 09:33) >  IMPRESSION:    Small acute infarcts in the right caudate and high right precentral   gyrus. No intracranial hemorrhage, midline shift, or extra-axial fluid   collection.

## 2022-03-06 NOTE — PROGRESS NOTE ADULT - ASSESSMENT
6 year old male with past medical history of HTN, DLD, DM presented with left arm weakness since 0715.    Impression  Possible ischemic stroke s/p tpa  H/O HTN  H/O DLD  H/O DM II  Diabetic Neuropathy    Plan    Neurological:  - CT Head post-TPA significant for two focal infarcts  - Neuro checks q4  - Continue Atorvastatin 80 daily  - Will start Aspirin today 325 daily  - Maintain BP<180  - PT/OT/Physiatry/Speech eval  - Stroke labs: TSH, Lipid profile LDL 74, A1c 8  - MRI Head with 2 new infarcts  - Downgrade to stroke unit 3E    Cardiovascular:  - SBP goal: Less than 180  - Continue home dose Lisinopril and Amlodipine  - HCTZ resumed yesterday  - Target goal at discharge less than 130  - Normovolemia  - Echo with bubble study normal  - ROMINA scheduled for Monday 3/7/22, NPO after midnight Sunday  - troponin 0.03, repeat 0.03, no chest pain reported  - Tele monitoring    Pulmonary:  - HOB 45  - Keep Sat 92-96%  - Monitor off oxygen    GI:  - Diet: DASH/Carb consistent  - Bowel Regimen: Senna daily    :  - Strict Is/Os  - Baseline Cr 1.6, Now Cr 1.7  - Monitor Creatinine    Electrolytes:  - Replete as needed  - Mg > 2, K >4    Endo  - Will increase insulin to 25/9/9/9 and sliding scale  - Carb consistent diet  - Was on Insulin 40 units at home in the past (stopped himself)  - POCT, ISS,  -180  - Continue Allopurinol for gout    ID:  - Trend Fever curve and WBC  - Normothermia  - Continue Acyclovir prophylaxis for herpes recurrence    Hematology:  - SCDs for now  - Low MCV, normal iron studies but possible thalassemia trait  - Can resume chemical prophylaxis today    Misc:  Peripheral IVs    Code Status: Full code

## 2022-03-06 NOTE — PHYSICAL THERAPY INITIAL EVALUATION ADULT - PERTINENT HX OF CURRENT PROBLEM, REHAB EVAL
56 year old male with past medical history of HTN, DLD, DM, right eye blindness (traumatic), diabetic neuropathy, gout, bilateral wrist surgeries, genital herpes presented with left arm weakness since 7 15am. 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

## 2022-03-06 NOTE — PHYSICAL THERAPY INITIAL EVALUATION ADULT - ADDITIONAL COMMENTS
Pt lives in apartment with wife and son, currently working as para in school. Pt independent in ADLs and ambulation.

## 2022-03-06 NOTE — CONSULT NOTE ADULT - TIME-BASED
Patient states bilateral lower back pain >right.  Pain worse after lifting child. No OTC meds.States incomplete empting of bladder.  
70
154.94

## 2022-03-06 NOTE — CONSULT NOTE ADULT - TIME BILLING
time spent on review of labs and imaging studies, obtaining history, personally examining patient, counselling and communicating with patient/ family, entering orders for medications/tests/etc, discussions with other health care providers, documentation in electronic health records, independent interpretation of labs, imaging/procedure results and care coordination.

## 2022-03-06 NOTE — CONSULT NOTE ADULT - SUBJECTIVE AND OBJECTIVE BOX
MELANIE PIKE  56y  Male    Patient is a 56y old  Male who presents with a chief complaint of left arm weakness (06 Mar 2022 09:01)      HPI:  56 year old male with past medical history of HTN, DLD, DM, right eye blindness (traumatic), diabetic neuropathy, gout, bilateral wrist surgeries, genital herpes presented with left arm weakness since 7 15am.    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 He pulled the car to the side and was later was send to ED on advice of the support staff as the arm did not get better. He denies any slurred speech, vision loss, loss of consciousness, leg weakness, chest pain, abdominal pain, nausea, vomiting, diarrhea, constipation or any other complaints.    In ED patient hemodynamically stable, afebrile, stroke code activated and CT Head negative for any hemorrhage, CTA Head negative for any large vessel occlusion, tpa administered and patient admitted to Neurocritical care unit.     NIH Score on admission: 1   Current NIH Score: 0 (03 Mar 2022 09:42)    S: Patient was examined and seen at bedside. This morning pt is resting comfortably in bed and reports no new issues or overnight events. No complaints, feels better. Improved strength in LUE.  Denies CP, SOB, N/V/D/C/AP, cough, F, chills, dizziness, new focal weakness, HA, vision changes, dysuria, or urinary symptoms, blood in stool.  ROS: all other systems reviewed and are negative    PAST MEDICAL & SURGICAL HISTORY:    SOCIAL HISTORY:  Tobacco: denies  Illicit drugs: denies  Alcohol: social  Family history reviewed and otherwise non-contributory  ALLERGIES: NKDA    MEDICATIONS  (STANDING):  acyclovir   Oral Tab/Cap 400 milliGRAM(s) Oral two times a day  allopurinol 300 milliGRAM(s) Oral daily  amLODIPine   Tablet 10 milliGRAM(s) Oral daily  aspirin enteric coated 325 milliGRAM(s) Oral daily  atorvastatin 80 milliGRAM(s) Oral at bedtime  chlorhexidine 4% Liquid 1 Application(s) Topical <User Schedule>  gabapentin 100 milliGRAM(s) Oral every 8 hours  heparin   Injectable 5000 Unit(s) SubCutaneous every 8 hours  hydrochlorothiazide 25 milliGRAM(s) Oral at bedtime  influenza   Vaccine 0.5 milliLiter(s) IntraMuscular once  insulin glargine Injectable (LANTUS) 25 Unit(s) SubCutaneous at bedtime  insulin lispro (ADMELOG) corrective regimen sliding scale   SubCutaneous three times a day before meals  insulin lispro Injectable (ADMELOG) 9 Unit(s) SubCutaneous three times a day before meals  lisinopril 40 milliGRAM(s) Oral daily  pantoprazole    Tablet 40 milliGRAM(s) Oral before breakfast  senna 2 Tablet(s) Oral at bedtime  tamsulosin 0.4 milliGRAM(s) Oral at bedtime    MEDICATIONS  (PRN):    Home Medications:  acyclovir 400 mg oral tablet: 1 tab(s) orally 2 times a day (03 Mar 2022 16:07)  allopurinol 300 mg oral tablet: 1 tab(s) orally once a day (03 Mar 2022 16:07)  amLODIPine 10 mg oral tablet: 1 tab(s) orally once a day (03 Mar 2022 16:07)  atorvastatin 40 mg oral tablet: 1 tab(s) orally once a day (03 Mar 2022 16:07)  hydroCHLOROthiazide 12.5 mg oral capsule: 1 cap(s) orally once a day (03 Mar 2022 16:07)  lisinopril 40 mg oral tablet: 1 tab(s) orally once a day (03 Mar 2022 16:07)  pioglitazone 30 mg oral tablet: 1 tab(s) orally once a day (03 Mar 2022 16:07)  repaglinide 2 mg oral tablet: 1 tab(s) orally 3 times a day (before meals) (03 Mar 2022 16:07)  Synjardy 12.5 mg-1000 mg oral tablet: 1 tab(s) orally 2 times a day (03 Mar 2022 16:07)  tamsulosin 0.4 mg oral capsule: 1 cap(s) orally once a day (03 Mar 2022 16:07)          Vital Signs Last 24 Hrs  T(C): 36.1 (06 Mar 2022 09:17), Max: 36.1 (06 Mar 2022 09:17)  T(F): 96.9 (06 Mar 2022 09:17), Max: 96.9 (06 Mar 2022 09:17)  HR: 82 (06 Mar 2022 09:17) (72 - 93)  BP: 143/75 (06 Mar 2022 09:17) (122/79 - 143/75)  BP(mean): 101 (06 Mar 2022 09:17) (88 - 101)  RR: 19 (06 Mar 2022 09:17) (18 - 25)  SpO2: 99% (06 Mar 2022 09:17) (96% - 99%)  CAPILLARY BLOOD GLUCOSE      POCT Blood Glucose.: 249 mg/dL (06 Mar 2022 11:23)  POCT Blood Glucose.: 215 mg/dL (06 Mar 2022 07:13)  POCT Blood Glucose.: 268 mg/dL (05 Mar 2022 21:25)  POCT Blood Glucose.: 215 mg/dL (05 Mar 2022 16:32)      General: NAD. Looks stated age.  HEENT: clean oropharynx, Rt ptosis, Et eye blindness (old) no LAD  Neck: trachea midline, no thyromegaly  CV: nl S1 S2; no m/r/g  Resp: decreased breath sounds at base  GI: NT/ND/S +BS  MS: no clubbing/cyanosis/edema, +pulses  Neuro: LUE 4+/5, LUE ataxia; rest 5/5, sensory intact; + reflexes  Skin: no rashes, nl turgor  Psychiatric: AA0x3 w/ intact insight and judgement    tele: SR, nonspecific changes (on my own evaluation of tele monitor)    LABS:                        13.1   5.84  )-----------( 217      ( 06 Mar 2022 07:26 )             43.3     03-06    140  |  104  |  24<H>  ----------------------------<  234<H>  4.2   |  26  |  1.5    Ca    8.8      06 Mar 2022 07:26  Phos  3.8     03-06  Mg     2.4     03-06    TPro  6.1  /  Alb  3.8  /  TBili  0.4  /  DBili  x   /  AST  23  /  ALT  23  /  AlkPhos  57  03-06    LIVER FUNCTIONS - ( 06 Mar 2022 07:26 )  Alb: 3.8 g/dL / Pro: 6.1 g/dL / ALK PHOS: 57 U/L / ALT: 23 U/L / AST: 23 U/L / GGT: x                         EKG - nonspecific changes (my read)  Chart and consultant noted personally reviewed.  Care Discussed with Consultants/Other Providers/ Housestaff [ x] YES [ ] NO   Radiology, labs, old records personally reviewed.

## 2022-03-06 NOTE — PROGRESS NOTE ADULT - ASSESSMENT
56 year old male with past medical history of  HTN, DLD, DM, right eye blindness (traumatic), diabetic neuropathy, gout, bilateral wrist surgeries, genital herpes presented with left arm weakness since 7 15am.. Patient is s/p tpa 3/3/22 and downgraded from NCC.     Impression  Possible ischemic stroke s/p tpa  H/O HTN  H/O DLD  H/O DM II  Diabetic Neuropathy    Plan    Neurological:  - CT Head post-TPA significant for two focal infarcts  - Neuro checks q4  - Continue Atorvastatin 80 daily  - Will start Aspirin today 325 daily  - Maintain BP<180  - PT/OT/Physiatry/Speech eval  - Stroke labs: TSH, Lipid profile LDL 74, A1c 8  - MRI Head with 2 new infarcts    Cardiovascular:  - SBP goal: Less than 180  - Continue home dose Lisinopril and Amlodipine  - HCTZ resumed yesterday  - Target goal at discharge less than 130  - Normovolemia  - Echo with bubble study normal  - ROMINA scheduled for  Monday 3/7/22, NPO after midnight Sunday  - troponin 0.03, repeat 0.03, no chest pain reported  - Tele monitoring    Pulmonary:  - HOB 45  - Keep Sat 92-96%  - Monitor off oxygen    GI:  - Diet: DASH/Carb consistent  - Bowel Regimen: Senna daily    :  - Strict Is/Os  - Baseline Cr 1.6, Now Cr 1.7  - Monitor Creatinine    Electrolytes:  - Replete as needed  - Mg > 2, K >4    Endo  - Will increase insulin to 25/9/9/9 and sliding scale  - Carb consistent diet  - Was on Insulin 40 units at home in the past (stopped himself)  - POCT, ISS,  -180  - Continue Allopurinol for gout    ID:  - Trend Fever curve and WBC  - Normothermia  - Continue Acyclovir prophylaxis for herpes recurrence    Hematology:  - SCDs for now  - Low MCV, normal iron studies but possible thalassemia trait  - On SCD  Misc:  Peripheral IVs    Code Status: Full code    Dispo: Stroke Unit     56 year old male with past medical history of  HTN, DLD, DM, right eye blindness (traumatic), diabetic neuropathy, gout, bilateral wrist surgeries, genital herpes presented with left arm weakness since 7 15am.. Patient is s/p tpa 3/3/22 and downgraded from NCC.     Impression  Possible ischemic stroke s/p tpa  H/O HTN  H/O DLD  H/O DM II  Diabetic Neuropathy    Plan    Neurological:  - CT Head post-TPA significant for two focal infarcts  - Neuro checks q4  - Continue Atorvastatin 80 daily  - Will start Aspirin today 325 daily  - Maintain BP<180  - PT/OT/Physiatry/Speech eval  - Stroke labs: TSH, Lipid profile LDL 74, A1c 8  - MRI Head with 2 new infarcts  -Started Gabapentin 100 mg TID for neuropathy    Cardiovascular:  - SBP goal: Less than 180  - Continue home dose Lisinopril and Amlodipine  - HCTZ resumed yesterday  - Target goal at discharge less than 130  - Normovolemia  - Echo with bubble study normal  - ROMINA scheduled for  Monday 3/7/22, NPO after midnight Sunday  - troponin 0.03, repeat 0.03, no chest pain reported  - Tele monitoring    Pulmonary:  - HOB 45  - Keep Sat 92-96%  - Monitor off oxygen    GI:  - Diet: DASH/Carb consistent  - Bowel Regimen: Senna daily    :  - Strict Is/Os  - Baseline Cr 1.6, Now Cr 1.7  - Monitor Creatinine    Electrolytes:  - Replete as needed  - Mg > 2, K >4    Endo  - Will increase insulin to 25/9/9/9 and sliding scale  - Carb consistent diet  - Was on Insulin 40 units at home in the past (stopped himself)  - POCT, ISS,  -180  - Continue Allopurinol for gout    ID:  - Trend Fever curve and WBC  - Normothermia  - Continue Acyclovir prophylaxis for herpes recurrence    Hematology:  - SCDs for now  - Low MCV, normal iron studies but possible thalassemia trait      DVT prophylaxis: Heprain subq  Code Status: Full code    Dispo: Stroke Unit

## 2022-03-06 NOTE — PHYSICAL THERAPY INITIAL EVALUATION ADULT - GENERAL OBSERVATIONS, REHAB EVAL
[id number] Pt received and left semifowlers in bed, NAD, pt agreeable to PT session. +tele +BP cuff +pulse ox

## 2022-03-06 NOTE — PROGRESS NOTE ADULT - SUBJECTIVE AND OBJECTIVE BOX
MELANIE PIKE  56y  Male    Patient is a 56y old  Male who presents with a chief complaint of left arm weakness (06 Mar 2022 09:01)      HPI:  56 year old male with past medical history of HTN, DLD, DM, right eye blindness (traumatic), diabetic neuropathy, gout, bilateral wrist surgeries, genital herpes presented with left arm weakness since 7 15am.    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 He pulled the car to the side and was later was send to ED on advice of the support staff as the arm did not get better. He denies any slurred speech, vision loss, loss of consciousness, leg weakness, chest pain, abdominal pain, nausea, vomiting, diarrhea, constipation or any other complaints.    In ED patient hemodynamically stable, afebrile, stroke code activated and CT Head negative for any hemorrhage, CTA Head negative for any large vessel occlusion, tpa administered and patient admitted to Neurocritical care unit.     NIH Score on admission: 1   Current NIH Score: 0 (03 Mar 2022 09:42)    S: Patient was examined and seen at bedside. This morning pt is resting comfortably in bed and reports no new issues or overnight events. No complaints, feels better  Denies CP, SOB, N/V/D/C/AP, cough, F, chills, dizziness, new focal weakness, HA, vision changes, dysuria, or urinary symptoms, blood in stool.  ROS: all other systems reviewed and are negative    PAST MEDICAL & SURGICAL HISTORY:    SOCIAL HISTORY:  Tobacco: denies  Illicit drugs: denies  Alcohol: social  Family history reviewed and otherwise non-contributory  ALLERGIES: NKDA    MEDICATIONS  (STANDING):  acyclovir   Oral Tab/Cap 400 milliGRAM(s) Oral two times a day  allopurinol 300 milliGRAM(s) Oral daily  amLODIPine   Tablet 10 milliGRAM(s) Oral daily  aspirin enteric coated 325 milliGRAM(s) Oral daily  atorvastatin 80 milliGRAM(s) Oral at bedtime  chlorhexidine 4% Liquid 1 Application(s) Topical <User Schedule>  gabapentin 100 milliGRAM(s) Oral every 8 hours  heparin   Injectable 5000 Unit(s) SubCutaneous every 8 hours  hydrochlorothiazide 25 milliGRAM(s) Oral at bedtime  influenza   Vaccine 0.5 milliLiter(s) IntraMuscular once  insulin glargine Injectable (LANTUS) 25 Unit(s) SubCutaneous at bedtime  insulin lispro (ADMELOG) corrective regimen sliding scale   SubCutaneous three times a day before meals  insulin lispro Injectable (ADMELOG) 9 Unit(s) SubCutaneous three times a day before meals  lisinopril 40 milliGRAM(s) Oral daily  pantoprazole    Tablet 40 milliGRAM(s) Oral before breakfast  senna 2 Tablet(s) Oral at bedtime  tamsulosin 0.4 milliGRAM(s) Oral at bedtime    MEDICATIONS  (PRN):    Home Medications:  acyclovir 400 mg oral tablet: 1 tab(s) orally 2 times a day (03 Mar 2022 16:07)  allopurinol 300 mg oral tablet: 1 tab(s) orally once a day (03 Mar 2022 16:07)  amLODIPine 10 mg oral tablet: 1 tab(s) orally once a day (03 Mar 2022 16:07)  atorvastatin 40 mg oral tablet: 1 tab(s) orally once a day (03 Mar 2022 16:07)  hydroCHLOROthiazide 12.5 mg oral capsule: 1 cap(s) orally once a day (03 Mar 2022 16:07)  lisinopril 40 mg oral tablet: 1 tab(s) orally once a day (03 Mar 2022 16:07)  pioglitazone 30 mg oral tablet: 1 tab(s) orally once a day (03 Mar 2022 16:07)  repaglinide 2 mg oral tablet: 1 tab(s) orally 3 times a day (before meals) (03 Mar 2022 16:07)  Synjardy 12.5 mg-1000 mg oral tablet: 1 tab(s) orally 2 times a day (03 Mar 2022 16:07)  tamsulosin 0.4 mg oral capsule: 1 cap(s) orally once a day (03 Mar 2022 16:07)          Vital Signs Last 24 Hrs  T(C): 36.1 (06 Mar 2022 09:17), Max: 36.1 (06 Mar 2022 09:17)  T(F): 96.9 (06 Mar 2022 09:17), Max: 96.9 (06 Mar 2022 09:17)  HR: 82 (06 Mar 2022 09:17) (72 - 93)  BP: 143/75 (06 Mar 2022 09:17) (122/79 - 143/75)  BP(mean): 101 (06 Mar 2022 09:17) (88 - 101)  RR: 19 (06 Mar 2022 09:17) (18 - 25)  SpO2: 99% (06 Mar 2022 09:17) (96% - 99%)  CAPILLARY BLOOD GLUCOSE      POCT Blood Glucose.: 249 mg/dL (06 Mar 2022 11:23)  POCT Blood Glucose.: 215 mg/dL (06 Mar 2022 07:13)  POCT Blood Glucose.: 268 mg/dL (05 Mar 2022 21:25)  POCT Blood Glucose.: 215 mg/dL (05 Mar 2022 16:32)      General: NAD. Looks stated age.  HEENT: clean oropharynx, EOMI, no LAD  Neck: trachea midline, no thyromegaly  CV: nl S1 S2; no m/r/g  Resp: decreased breath sounds at base  GI: NT/ND/S +BS  MS: no clubbing/cyanosis/edema, +pulses  Neuro: LUE 4/5, rest 5/5, LUE ataxia, sensory intact; + reflexes  Skin: no rashes, nl turgor  Psychiatric: AA0x3 w/ intact insight and judgement    tele: SR, nonspecific changes (on my own evaluation of tele monitor)    LABS:                        13.1   5.84  )-----------( 217      ( 06 Mar 2022 07:26 )             43.3     03-06    140  |  104  |  24<H>  ----------------------------<  234<H>  4.2   |  26  |  1.5    Ca    8.8      06 Mar 2022 07:26  Phos  3.8     03-06  Mg     2.4     03-06    TPro  6.1  /  Alb  3.8  /  TBili  0.4  /  DBili  x   /  AST  23  /  ALT  23  /  AlkPhos  57  03-06    LIVER FUNCTIONS - ( 06 Mar 2022 07:26 )  Alb: 3.8 g/dL / Pro: 6.1 g/dL / ALK PHOS: 57 U/L / ALT: 23 U/L / AST: 23 U/L / GGT: x                         EKG - nonspecific changes (my read)  Chart and consultant noted personally reviewed.  Care Discussed with Consultants/Other Providers/ Housestaff [ x] YES [ ] NO   Radiology, labs, old records personally reviewed.

## 2022-03-07 ENCOUNTER — TRANSCRIPTION ENCOUNTER (OUTPATIENT)
Age: 57
End: 2022-03-07

## 2022-03-07 LAB
ALBUMIN SERPL ELPH-MCNC: 4.1 G/DL — SIGNIFICANT CHANGE UP (ref 3.5–5.2)
ALP SERPL-CCNC: 58 U/L — SIGNIFICANT CHANGE UP (ref 30–115)
ALT FLD-CCNC: 28 U/L — SIGNIFICANT CHANGE UP (ref 0–41)
ANION GAP SERPL CALC-SCNC: 13 MMOL/L — SIGNIFICANT CHANGE UP (ref 7–14)
APTT BLD: 47.6 SEC — HIGH (ref 27–39.2)
AST SERPL-CCNC: 25 U/L — SIGNIFICANT CHANGE UP (ref 0–41)
BILIRUB SERPL-MCNC: 0.4 MG/DL — SIGNIFICANT CHANGE UP (ref 0.2–1.2)
BUN SERPL-MCNC: 22 MG/DL — HIGH (ref 10–20)
CALCIUM SERPL-MCNC: 9.2 MG/DL — SIGNIFICANT CHANGE UP (ref 8.5–10.1)
CHLORIDE SERPL-SCNC: 104 MMOL/L — SIGNIFICANT CHANGE UP (ref 98–110)
CO2 SERPL-SCNC: 24 MMOL/L — SIGNIFICANT CHANGE UP (ref 17–32)
CREAT SERPL-MCNC: 1.5 MG/DL — SIGNIFICANT CHANGE UP (ref 0.7–1.5)
EGFR: 54 ML/MIN/1.73M2 — LOW
GLUCOSE BLDC GLUCOMTR-MCNC: 182 MG/DL — HIGH (ref 70–99)
GLUCOSE BLDC GLUCOMTR-MCNC: 190 MG/DL — HIGH (ref 70–99)
GLUCOSE BLDC GLUCOMTR-MCNC: 261 MG/DL — HIGH (ref 70–99)
GLUCOSE BLDC GLUCOMTR-MCNC: 338 MG/DL — HIGH (ref 70–99)
GLUCOSE SERPL-MCNC: 193 MG/DL — HIGH (ref 70–99)
HCT VFR BLD CALC: 45.5 % — SIGNIFICANT CHANGE UP (ref 42–52)
HGB BLD-MCNC: 13.9 G/DL — LOW (ref 14–18)
INR BLD: 1 RATIO — SIGNIFICANT CHANGE UP (ref 0.65–1.3)
MAGNESIUM SERPL-MCNC: 2 MG/DL — SIGNIFICANT CHANGE UP (ref 1.8–2.4)
MCHC RBC-ENTMCNC: 20.4 PG — LOW (ref 27–31)
MCHC RBC-ENTMCNC: 30.5 G/DL — LOW (ref 32–37)
MCV RBC AUTO: 66.8 FL — LOW (ref 80–94)
NRBC # BLD: 0 /100 WBCS — SIGNIFICANT CHANGE UP (ref 0–0)
PHOSPHATE SERPL-MCNC: 4 MG/DL — SIGNIFICANT CHANGE UP (ref 2.1–4.9)
PLATELET # BLD AUTO: 225 K/UL — SIGNIFICANT CHANGE UP (ref 130–400)
POTASSIUM SERPL-MCNC: 3.9 MMOL/L — SIGNIFICANT CHANGE UP (ref 3.5–5)
POTASSIUM SERPL-SCNC: 3.9 MMOL/L — SIGNIFICANT CHANGE UP (ref 3.5–5)
PROT SERPL-MCNC: 6.6 G/DL — SIGNIFICANT CHANGE UP (ref 6–8)
PROTHROM AB SERPL-ACNC: 11.5 SEC — SIGNIFICANT CHANGE UP (ref 9.95–12.87)
RBC # BLD: 6.81 M/UL — HIGH (ref 4.7–6.1)
RBC # FLD: 18.4 % — HIGH (ref 11.5–14.5)
SODIUM SERPL-SCNC: 141 MMOL/L — SIGNIFICANT CHANGE UP (ref 135–146)
WBC # BLD: 5.49 K/UL — SIGNIFICANT CHANGE UP (ref 4.8–10.8)
WBC # FLD AUTO: 5.49 K/UL — SIGNIFICANT CHANGE UP (ref 4.8–10.8)

## 2022-03-07 PROCEDURE — 93312 ECHO TRANSESOPHAGEAL: CPT | Mod: 26,XU

## 2022-03-07 PROCEDURE — 99233 SBSQ HOSP IP/OBS HIGH 50: CPT

## 2022-03-07 PROCEDURE — 99254 IP/OBS CNSLTJ NEW/EST MOD 60: CPT

## 2022-03-07 PROCEDURE — 93320 DOPPLER ECHO COMPLETE: CPT | Mod: 26

## 2022-03-07 PROCEDURE — 93325 DOPPLER ECHO COLOR FLOW MAPG: CPT | Mod: 26

## 2022-03-07 RX ORDER — ASPIRIN/CALCIUM CARB/MAGNESIUM 324 MG
1 TABLET ORAL
Qty: 30 | Refills: 0
Start: 2022-03-07 | End: 2022-04-05

## 2022-03-07 RX ORDER — CLOPIDOGREL BISULFATE 75 MG/1
1 TABLET, FILM COATED ORAL
Qty: 30 | Refills: 0
Start: 2022-03-07 | End: 2022-04-05

## 2022-03-07 RX ORDER — CLOPIDOGREL BISULFATE 75 MG/1
75 TABLET, FILM COATED ORAL DAILY
Refills: 0 | Status: DISCONTINUED | OUTPATIENT
Start: 2022-03-07 | End: 2022-03-08

## 2022-03-07 RX ORDER — ASPIRIN/CALCIUM CARB/MAGNESIUM 324 MG
81 TABLET ORAL DAILY
Refills: 0 | Status: DISCONTINUED | OUTPATIENT
Start: 2022-03-08 | End: 2022-03-08

## 2022-03-07 RX ADMIN — Medication 400 MILLIGRAM(S): at 05:09

## 2022-03-07 RX ADMIN — PANTOPRAZOLE SODIUM 40 MILLIGRAM(S): 20 TABLET, DELAYED RELEASE ORAL at 06:01

## 2022-03-07 RX ADMIN — Medication 400 MILLIGRAM(S): at 17:15

## 2022-03-07 RX ADMIN — Medication 325 MILLIGRAM(S): at 11:31

## 2022-03-07 RX ADMIN — HEPARIN SODIUM 5000 UNIT(S): 5000 INJECTION INTRAVENOUS; SUBCUTANEOUS at 21:15

## 2022-03-07 RX ADMIN — GABAPENTIN 100 MILLIGRAM(S): 400 CAPSULE ORAL at 05:10

## 2022-03-07 RX ADMIN — Medication 12 UNIT(S): at 16:47

## 2022-03-07 RX ADMIN — INSULIN GLARGINE 30 UNIT(S): 100 INJECTION, SOLUTION SUBCUTANEOUS at 21:15

## 2022-03-07 RX ADMIN — Medication 12 UNIT(S): at 11:31

## 2022-03-07 RX ADMIN — TAMSULOSIN HYDROCHLORIDE 0.4 MILLIGRAM(S): 0.4 CAPSULE ORAL at 21:14

## 2022-03-07 RX ADMIN — Medication 25 MILLIGRAM(S): at 21:14

## 2022-03-07 RX ADMIN — CLOPIDOGREL BISULFATE 75 MILLIGRAM(S): 75 TABLET, FILM COATED ORAL at 12:57

## 2022-03-07 RX ADMIN — Medication 4: at 16:47

## 2022-03-07 RX ADMIN — Medication 300 MILLIGRAM(S): at 11:31

## 2022-03-07 RX ADMIN — GABAPENTIN 100 MILLIGRAM(S): 400 CAPSULE ORAL at 13:06

## 2022-03-07 RX ADMIN — GABAPENTIN 100 MILLIGRAM(S): 400 CAPSULE ORAL at 21:14

## 2022-03-07 RX ADMIN — ATORVASTATIN CALCIUM 80 MILLIGRAM(S): 80 TABLET, FILM COATED ORAL at 21:14

## 2022-03-07 RX ADMIN — LISINOPRIL 40 MILLIGRAM(S): 2.5 TABLET ORAL at 05:10

## 2022-03-07 RX ADMIN — AMLODIPINE BESYLATE 10 MILLIGRAM(S): 2.5 TABLET ORAL at 05:10

## 2022-03-07 RX ADMIN — HEPARIN SODIUM 5000 UNIT(S): 5000 INJECTION INTRAVENOUS; SUBCUTANEOUS at 13:06

## 2022-03-07 RX ADMIN — CHLORHEXIDINE GLUCONATE 1 APPLICATION(S): 213 SOLUTION TOPICAL at 05:10

## 2022-03-07 RX ADMIN — Medication 6: at 11:32

## 2022-03-07 RX ADMIN — HEPARIN SODIUM 5000 UNIT(S): 5000 INJECTION INTRAVENOUS; SUBCUTANEOUS at 05:10

## 2022-03-07 NOTE — PROGRESS NOTE ADULT - ASSESSMENT
56 year old male with past medical history of HTN, DLD, DM presented with left arm weakness since 0715.    Acute ischemic stroke s/p tpa  - CT Head post-TPA significant for two focal infarcts  - Neuro checks q4  - Continue Atorvastatin 80 daily  - antiplatelets as per neuro  - PT/OT/Physiatry/Speech eval  - Stroke labs: TSH nl, Lipid profile LDL 74, A1c 8.8  - MRI Head with 2 new infarcts  - ROMINA w/out thrombus or shunt  - EPS c/s for ILR    HTN  - Continue home dose Lisinopril and Amlodipine  - HCTZ resumed   - Target goal at discharge less than 140  - Normovolemia  - Echo with bubble study normal  - ROMINA scheduled for Monday 3/7/22, NPO after midnight Sunday  - troponin 0.03, repeat 0.03, no chest pain reported  - Tele monitoring  - outpt ischemic workup as high probability of CAD (pt/spouse aware)    Uncontrolled DM  - Uptitrate insulin for better control  - Carb consistent diet  - Was on Insulin 40 units at home in the past (stopped himself)  - POCT, ISS,  -180  - counselled about importance of tight control    Gout  - Continue Allopurinol    H/o Herpes  - Continue Acyclovir prophylaxis for herpes recurrence    HLD  -HD statin    Peripheral neuropathy  -Neurontin    DVT Px: Heparin SQ, SCDs    Code Status: Full code    #Progress Note Handoff  Pending: Consults__EP__Clinical improvement and stability__x___Tests___ILR_____PT____x____  Pt/Family discussion: Pt informed and agrees with the current plan  Disposition: Home    My note supersedes the residents note should a discrepancy arise.    Chart and notes personally reviewed.  Care Discussed with Consultants/Other Providers/ Housestaff [ x] YES [ ] NO   Radiology, labs, old records personally reviewed.    discussed w/ housestaff, nursing, case management, neuro team, spouse   56 year old male with past medical history of HTN, DLD, DM presented with left arm weakness since 0715.    Acute ischemic stroke s/p tpa  - CT Head post-TPA significant for two focal infarcts  - Neuro checks q4  - Continue Atorvastatin 80 daily  - antiplatelets as per neuro  - PT/OT/Physiatry/Speech eval  - Stroke labs: TSH nl, Lipid profile LDL 74, A1c 8.8  - MRI Head with 2 new infarcts  - ROMINA w/out thrombus or shunt  - EPS c/s for ILR    HTN  - Continue home dose Lisinopril and Amlodipine  - HCTZ resumed   - Target goal at discharge less than 140  - Normovolemia  - Echo with bubble study normal  - ROMINA scheduled for Monday 3/7/22, NPO after midnight Sunday  - troponin 0.03, repeat 0.03, no chest pain reported  - Tele monitoring  - outpt ischemic workup as high probability of CAD (pt/spouse aware)    Uncontrolled DM  - Uptitrate insulin for better control  - Carb consistent diet  - Was on Insulin 40 units at home in the past (stopped himself)  - POCT, ISS,  -180  - counselled about importance of tight control    Gout  - Continue Allopurinol    H/o Herpes  - Continue Acyclovir prophylaxis for herpes recurrence    HLD  -HD statin    Peripheral neuropathy  -Neurontin    CKD3  stable    Microcytic anemia  -outpt workup      DVT Px: Heparin SQ, SCDs    Code Status: Full code    #Progress Note Handoff  Pending: Consults__EP__Clinical improvement and stability__x___Tests___ILR_____PT____x____  Pt/Family discussion: Pt informed and agrees with the current plan  Disposition: Home    My note supersedes the residents note should a discrepancy arise.    Chart and notes personally reviewed.  Care Discussed with Consultants/Other Providers/ Housestaff [ x] YES [ ] NO   Radiology, labs, old records personally reviewed.    discussed w/ housestaff, nursing, case management, neuro team, spouse

## 2022-03-07 NOTE — PROGRESS NOTE ADULT - ASSESSMENT
56M with PMH of HTN, DLD, DM, right eye blindness (traumatic), diabetic neuropathy, gout, bilateral wrist surgeries, genital herpes presented with left arm weakness since 7 15am.. Patient is s/p tPA on 3/3/22 and downgraded from NCC.       Neurological  - Repeat CT Head s/p tPA: significant for two focal infarcts  - MR Head: small acute infarcts in the right caudate and high right precentral gyrus  - ROMINA: no thrombus, no PFO  - C/w Atorvastatin 80 daily  - C/w Aspirin 81mg and Plavix 75mg daily  - Neuro checks q4  - Maintain BP<180  - PT/OT/Physiatry/Speech eval  - LDL 74, A1c 8  - C/w Gabapentin 100 mg TID for neuropathy      Cardiovascular  - SBP goal: Less than 180  - Continue home dose Lisinopril and Amlodipine  - C/w HCTZ  - Target goal at discharge less than 130  - Normovolemia  - Echo with bubble study normal  - troponin 0.03, repeat 0.03, no chest pain reported  - Tele monitoring      Pulmonary  - HOB 45  - Keep Sat 92-96%  - Monitor off oxygen      GI  - Diet: DASH/Carb consistent  - Bowel Regimen: Senna daily        - Strict Is/Os  - Baseline Cr 1.6, Now Cr 1.7  - Monitor Creatinine      Electrolytes  - Replete as needed  - Mg > 2, K >4      Endo  - Will increase insulin to 25/9/9/9 and sliding scale  - Carb consistent diet  - Was on Insulin 40 units at home in the past (stopped himself)  - POCT, ISS,  -180  - Continue Allopurinol for gout      ID  - Trend Fever curve and WBC  - Normothermia  - Continue Acyclovir prophylaxis for herpes recurrence      Hematology  - SCDs for now  - Low MCV, normal iron studies but possible thalassemia trait      Misc  - Activity IAT  - Diet: DASH  - DVT prophylaxis: SQH  - Code Status: Full code  - Dispo: Stroke Unit, then home with home services

## 2022-03-07 NOTE — PROGRESS NOTE ADULT - SUBJECTIVE AND OBJECTIVE BOX
MELANIE PIKE  56y  Male    Patient is a 56y old  Male who presents with a chief complaint of left arm weakness (06 Mar 2022 09:01)      HPI:  56 year old male with past medical history of HTN, DLD, DM, right eye blindness (traumatic), diabetic neuropathy, gout, bilateral wrist surgeries, genital herpes presented with left arm weakness since 7 15am.    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 He pulled the car to the side and was later was send to ED on advice of the support staff as the arm did not get better. He denies any slurred speech, vision loss, loss of consciousness, leg weakness, chest pain, abdominal pain, nausea, vomiting, diarrhea, constipation or any other complaints.    In ED patient hemodynamically stable, afebrile, stroke code activated and CT Head negative for any hemorrhage, CTA Head negative for any large vessel occlusion, tpa administered and patient admitted to Neurocritical care unit.     NIH Score on admission: 1   Current NIH Score: 0 (03 Mar 2022 09:42)    S: Patient was examined and seen at bedside. This morning pt is resting comfortably in bed and reports no new issues or overnight events. No complaints, feels better. Improved strength in LUE. Wife at bedside.  Denies CP, SOB, N/V/D/C/AP, cough, F, chills, dizziness, new focal weakness, HA, vision changes, dysuria, or urinary symptoms, blood in stool.  ROS: all other systems reviewed and are negative    PAST MEDICAL & SURGICAL HISTORY:    SOCIAL HISTORY:  Tobacco: denies  Illicit drugs: denies  Alcohol: social  Family history reviewed and otherwise non-contributory  ALLERGIES: NKDA      General: NAD. Looks stated age.  HEENT: clean oropharynx, Rt ptosis, Et eye blindness (old) no LAD  Neck: trachea midline, no thyromegaly  CV: nl S1 S2; no m/r/g  Resp: decreased breath sounds at base  GI: NT/ND/S +BS  MS: no clubbing/cyanosis/edema, +pulses  Neuro: LUE 4+/5, LUE ataxia; rest 5/5, sensory intact; + reflexes  Skin: no rashes, nl turgor  Psychiatric: AA0x3 w/ intact insight and judgement    tele: SR, nonspecific changes (on my own evaluation of tele monitor)            MEDICATIONS  (STANDING):  acyclovir   Oral Tab/Cap 400 milliGRAM(s) Oral two times a day  allopurinol 300 milliGRAM(s) Oral daily  amLODIPine   Tablet 10 milliGRAM(s) Oral daily  atorvastatin 80 milliGRAM(s) Oral at bedtime  chlorhexidine 4% Liquid 1 Application(s) Topical <User Schedule>  clopidogrel Tablet 75 milliGRAM(s) Oral daily  gabapentin 100 milliGRAM(s) Oral every 8 hours  heparin   Injectable 5000 Unit(s) SubCutaneous every 8 hours  hydrochlorothiazide 25 milliGRAM(s) Oral at bedtime  influenza   Vaccine 0.5 milliLiter(s) IntraMuscular once  insulin glargine Injectable (LANTUS) 30 Unit(s) SubCutaneous at bedtime  insulin lispro (ADMELOG) corrective regimen sliding scale   SubCutaneous three times a day before meals  insulin lispro Injectable (ADMELOG) 12 Unit(s) SubCutaneous three times a day before meals  lisinopril 40 milliGRAM(s) Oral daily  pantoprazole    Tablet 40 milliGRAM(s) Oral before breakfast  senna 2 Tablet(s) Oral at bedtime  tamsulosin 0.4 milliGRAM(s) Oral at bedtime    MEDICATIONS  (PRN):    Home Medications:  acyclovir 400 mg oral tablet: 1 tab(s) orally 2 times a day (03 Mar 2022 16:07)  allopurinol 300 mg oral tablet: 1 tab(s) orally once a day (03 Mar 2022 16:07)  amLODIPine 10 mg oral tablet: 1 tab(s) orally once a day (03 Mar 2022 16:07)  atorvastatin 40 mg oral tablet: 1 tab(s) orally once a day (03 Mar 2022 16:07)  hydroCHLOROthiazide 12.5 mg oral capsule: 1 cap(s) orally once a day (03 Mar 2022 16:07)  lisinopril 40 mg oral tablet: 1 tab(s) orally once a day (03 Mar 2022 16:07)  pioglitazone 30 mg oral tablet: 1 tab(s) orally once a day (03 Mar 2022 16:07)  repaglinide 2 mg oral tablet: 1 tab(s) orally 3 times a day (before meals) (03 Mar 2022 16:07)  Synjardy 12.5 mg-1000 mg oral tablet: 1 tab(s) orally 2 times a day (03 Mar 2022 16:07)  tamsulosin 0.4 mg oral capsule: 1 cap(s) orally once a day (03 Mar 2022 16:07)    Vital Signs Last 24 Hrs  T(C): 36.8 (06 Mar 2022 17:52), Max: 36.8 (06 Mar 2022 17:52)  T(F): 98.2 (06 Mar 2022 17:52), Max: 98.2 (06 Mar 2022 17:52)  HR: 86 (07 Mar 2022 12:00) (69 - 86)  BP: 137/79 (07 Mar 2022 12:00) (129/73 - 156/88)  BP(mean): 101 (07 Mar 2022 12:00) (85 - 133)  RR: 18 (07 Mar 2022 12:00) (17 - 19)  SpO2: 98% (07 Mar 2022 12:00) (95% - 98%)  CAPILLARY BLOOD GLUCOSE      POCT Blood Glucose.: 338 mg/dL (07 Mar 2022 10:57)  POCT Blood Glucose.: 182 mg/dL (07 Mar 2022 07:09)  POCT Blood Glucose.: 251 mg/dL (06 Mar 2022 21:08)  POCT Blood Glucose.: 182 mg/dL (06 Mar 2022 16:27)    LABS:                        13.9   5.49  )-----------( 225      ( 07 Mar 2022 04:30 )             45.5     03-07    141  |  104  |  22<H>  ----------------------------<  193<H>  3.9   |  24  |  1.5    Ca    9.2      07 Mar 2022 04:30  Phos  4.0     03-07  Mg     2.0     03-07    TPro  6.6  /  Alb  4.1  /  TBili  0.4  /  DBili  x   /  AST  25  /  ALT  28  /  AlkPhos  58  03-07    LIVER FUNCTIONS - ( 07 Mar 2022 04:30 )  Alb: 4.1 g/dL / Pro: 6.6 g/dL / ALK PHOS: 58 U/L / ALT: 28 U/L / AST: 25 U/L / GGT: x               PT/INR - ( 07 Mar 2022 04:30 )   PT: 11.50 sec;   INR: 1.00 ratio         PTT - ( 07 Mar 2022 04:30 )  PTT:47.6 sec            Consultant Notes Reviewed:  [x ] YES  [ ] NO  Care Discussed with Consultants/Other Providers/ Housestaff [ x] YES  [ ] NO  Radiology, labs, new studies personally reviewed.

## 2022-03-07 NOTE — DISCHARGE NOTE PROVIDER - NSDCFUADDINST_GEN_ALL_CORE_FT
To Who It May Concern,    Please be advised that Yaz Curran was evaluated at [facility] from 3/3/22 - 3/8/22. Please direct all additional questions to [phone].    Bj Martinez MD

## 2022-03-07 NOTE — DISCHARGE NOTE PROVIDER - NSDCCPCAREPLAN_GEN_ALL_CORE_FT
PRINCIPAL DISCHARGE DIAGNOSIS  Diagnosis: Stroke  Assessment and Plan of Treatment: During this hospital admission, you had an ischemic stroke. During an ischemic stroke, blood stops flowing to part of your brain because of a blockage in the blood vessel. This can damage areas in the brain that control other parts of the body.  Please take your aspirin and plavix for blood thinning and Atorvastatin for cholesterol medication/blood vessel protection as prescribed to prevent further strokes. Do not skip doses and do not run low on your medication. If you run low on your medication, please contact your doctor.  You will follow up outpatient with the stroke Nurse Practitioner as scheduled below.  Doing your regular tasks may be difficult after you've had a stroke, but you can learn new ways to manage your daily activities. In fact, doing daily activities may help you to regain muscle strength. Be patient, give yourself time to adjust, and appreciate the progress you make. For example, when showering or bathing, test the water temperature with a hand or foot that was not affected by the stroke, use grab bars, a shower seat, a hand-held showerhead, etc. It is normal to feel fatigue after a stroke, while some days may be worse than others, you will continue to improve.  Call 911 right away if you have any of the following symptoms of another stroke:  B: Balance: Sudden: Dizziness, loss of balance, or a sense of falling, difficulty with coordinating movement  E: Eyes: Sudden double vision or trouble seeing in one or both eyes  F: Face: Sudden uneven face  A: Arms (Legs): Sudden weakness, tingling, or loss of feeling on one side of your face or body  S: Speech: Sudden trouble talking or slurred speech, sudden difficulty understanding others  T: Time: Please call 911 right away and go to the emergency room  •Sudden, severe headache  •Blackouts or seizures

## 2022-03-07 NOTE — DISCHARGE NOTE PROVIDER - PROVIDER TOKENS
PROVIDER:[TOKEN:[[id number]],FOLLOWUP:[1 week]],PROVIDER:[TOKEN:[[id number]],FOLLOWUP:[2 weeks]]

## 2022-03-07 NOTE — DISCHARGE NOTE PROVIDER - NSDCMRMEDTOKEN_GEN_ALL_CORE_FT
acyclovir 400 mg oral tablet: 1 tab(s) orally 2 times a day  allopurinol 300 mg oral tablet: 1 tab(s) orally once a day  amLODIPine 10 mg oral tablet: 1 tab(s) orally once a day  atorvastatin 40 mg oral tablet: 1 tab(s) orally once a day  hydroCHLOROthiazide 12.5 mg oral capsule: 1 cap(s) orally once a day  lisinopril 40 mg oral tablet: 1 tab(s) orally once a day  pioglitazone 30 mg oral tablet: 1 tab(s) orally once a day  repaglinide 2 mg oral tablet: 1 tab(s) orally 3 times a day (before meals)  Synjardy 12.5 mg-1000 mg oral tablet: 1 tab(s) orally 2 times a day  tamsulosin 0.4 mg oral capsule: 1 cap(s) orally once a day   acyclovir 400 mg oral tablet: 1 tab(s) orally 2 times a day  allopurinol 300 mg oral tablet: 1 tab(s) orally once a day  amLODIPine 10 mg oral tablet: 1 tab(s) orally once a day  aspirin 81 mg oral delayed release tablet: 1 tab(s) orally once a day  atorvastatin 40 mg oral tablet: 1 tab(s) orally once a day  clopidogrel 75 mg oral tablet: 1 tab(s) orally once a day  hydroCHLOROthiazide 12.5 mg oral capsule: 1 cap(s) orally once a day  lisinopril 40 mg oral tablet: 1 tab(s) orally once a day  pioglitazone 30 mg oral tablet: 1 tab(s) orally once a day  repaglinide 2 mg oral tablet: 1 tab(s) orally 3 times a day (before meals)  Synjardy 12.5 mg-1000 mg oral tablet: 1 tab(s) orally 2 times a day  tamsulosin 0.4 mg oral capsule: 1 cap(s) orally once a day

## 2022-03-07 NOTE — CHART NOTE - NSCHARTNOTEFT_GEN_A_CORE
POST OPERATIVE PROCEDURAL DOCUMENTATION    PRE-OP DIAGNOSIS: CVA rule out cardioembolic source    PROCEDURE: Transesophageal echocardiogram    Primary Physician: Dr. Ferguson  Assistant: Cally    ANESTHESIA TYPE  [  ] General Anesthesia  [ x ] Conscious Sedation  [  ] Local/Regional    CONDITION  [  ] Critical  [  ] Serious  [x] Fair  [  ] Good    SPECIMENS REMOVED (IF APPLICABLE): N/A    IMPLANTS (IF APPLICABLE): None    ESTIMATED BLOOD LOSS: None    COMPLICATIONS: None      FINDINGS:    After risks and benefits of procedures were explained, informed consent was obtained and placed in chart.   Refer to Anesthesia note for sedation details.  The ROMINA probe was passed into the esophagus without difficulty.  Transesophageal and transgastric images were obtained.  The ROMINA probe was removed without difficulty and examined.  There was no evidence for bleeding.  The patient tolerated the procedure well without any immediate ROMINA-related complications.      Preliminary Findings:  LA and RA: Normal size.   JOVANNI: Left atrial appendage was clear of clot and spontaneous echo contrast. Good velocities across JOVANNI.  LV: LVEF was estimated at 65-70%. LVH.  RV: Normal size and systolic function.  MV: Trace MR. No evidence for MS.   AV: Trace AI. No evidence for AS.   TV: Trace TR.   IAS: No PFO. NO R-> L shunt on color doppler or injection of agitated saline contrast.  There was mild, non-mobile atheroma seen in the thoracic aorta.     DIAGNOSIS/IMPRESSION:  No evidence of cardioembolic source of CVA.    PLAN OF CARE:  [x] Further management of CVA as per neurology team.  [x] Continue with BP and diabetic control.  Results of procedure/ plan of care discussed with patient/  in detail.

## 2022-03-07 NOTE — DISCHARGE NOTE PROVIDER - CARE PROVIDER_API CALL
Sumeet Guallpa)  Neurology  [address] [place] [address]  Phone: [phone]  Fax: [phone]  Follow Up Time: 1 week    Jones Tsang)  Cardiac Electrophysiology; Cardiovascular Disease; [facility]  [address] [place] [address]  Phone: [phone]  Fax: [phone]  Follow Up Time: 2 weeks

## 2022-03-07 NOTE — PROGRESS NOTE ADULT - SUBJECTIVE AND OBJECTIVE BOX
Neurology Progress Note    Interval History:    No events overnight. PT resting comfortably in bed this morning. S/p ROMINA.          Medications:  acyclovir   Oral Tab/Cap 400 milliGRAM(s) Oral two times a day  allopurinol 300 milliGRAM(s) Oral daily  amLODIPine   Tablet 10 milliGRAM(s) Oral daily  aspirin enteric coated 325 milliGRAM(s) Oral daily  atorvastatin 80 milliGRAM(s) Oral at bedtime  chlorhexidine 4% Liquid 1 Application(s) Topical <User Schedule>  gabapentin 100 milliGRAM(s) Oral every 8 hours  heparin   Injectable 5000 Unit(s) SubCutaneous every 8 hours  hydrochlorothiazide 25 milliGRAM(s) Oral at bedtime  influenza   Vaccine 0.5 milliLiter(s) IntraMuscular once  insulin glargine Injectable (LANTUS) 30 Unit(s) SubCutaneous at bedtime  insulin lispro (ADMELOG) corrective regimen sliding scale   SubCutaneous three times a day before meals  insulin lispro Injectable (ADMELOG) 12 Unit(s) SubCutaneous three times a day before meals  lisinopril 40 milliGRAM(s) Oral daily  pantoprazole    Tablet 40 milliGRAM(s) Oral before breakfast  senna 2 Tablet(s) Oral at bedtime  tamsulosin 0.4 milliGRAM(s) Oral at bedtime      Vital Signs Last 24 Hrs  T(C): 36.8 (06 Mar 2022 17:52), Max: 36.8 (06 Mar 2022 17:52)  T(F): 98.2 (06 Mar 2022 17:52), Max: 98.2 (06 Mar 2022 17:52)  HR: 76 (07 Mar 2022 08:20) (69 - 86)  BP: 144/90 (07 Mar 2022 08:20) (129/73 - 156/88)  BP(mean): 110 (07 Mar 2022 08:20) (85 - 133)  RR: 18 (07 Mar 2022 08:20) (17 - 19)  SpO2: 97% (07 Mar 2022 08:20) (95% - 98%)    Neurological Examination:  General:  Appearance is consistent with chronologic age.  No abnormal facies.  Gross skin survey within normal limits.    Cognitive/Language:  Awake, alert, and oriented to person, place, time and date.  Recent and remote memory intact.  Fund of knowledge is appropriate.  Naming, repetition and comprehension intact.   Nondysarthric.    Cranial Nerves  - Eyes: Right eye blindness with ptosis (baseline from traumatic event). Left eye visual acuity intact, visual fields full.  EOMI w/o nystagmus, skew or reported double vision.  PERRL.    - Face:  Facial sensation normal V1 - 3, no facial asymmetry.    - Ears/Nose/Throat:  Hearing grossly intact b/l to finger rub.  Palate elevates midline.  Tongue and uvula midline.   Motor examination:  Upper Extremities (proximal): L 5/5, R 5/5; Left hand  4/5 with 4th and 5th digit weakness; Lower extremities: L 5/5, R 5/5.  No observable drift. Normal tone and bulk. No tenderness, twitching, tremors or involuntary movements.  Sensory examination:   Intact to light touch throughout  Cerebellum:   FTN/HKS intact.  No dysmetria or dysdiadokinesia.  Gait narrow based and normal.    Labs:  CBC Full  -  ( 07 Mar 2022 04:30 )  WBC Count : 5.49 K/uL  RBC Count : 6.81 M/uL  Hemoglobin : 13.9 g/dL  Hematocrit : 45.5 %  Platelet Count - Automated : 225 K/uL  Mean Cell Volume : 66.8 fL  Mean Cell Hemoglobin : 20.4 pg  Mean Cell Hemoglobin Concentration : 30.5 g/dL  Auto Neutrophil # : x  Auto Lymphocyte # : x  Auto Monocyte # : x  Auto Eosinophil # : x  Auto Basophil # : x  Auto Neutrophil % : x  Auto Lymphocyte % : x  Auto Monocyte % : x  Auto Eosinophil % : x  Auto Basophil % : x    03-07    141  |  104  |  22<H>  ----------------------------<  193<H>  3.9   |  24  |  1.5    Ca    9.2      07 Mar 2022 04:30  Phos  4.0     03-07  Mg     2.0     03-07    TPro  6.6  /  Alb  4.1  /  TBili  0.4  /  DBili  x   /  AST  25  /  ALT  28  /  AlkPhos  58  03-07    LIVER FUNCTIONS - ( 07 Mar 2022 04:30 )  Alb: 4.1 g/dL / Pro: 6.6 g/dL / ALK PHOS: 58 U/L / ALT: 28 U/L / AST: 25 U/L / GGT: x           PT/INR - ( 07 Mar 2022 04:30 )   PT: 11.50 sec;   INR: 1.00 ratio         PTT - ( 07 Mar 2022 04:30 )  PTT:47.6 sec

## 2022-03-07 NOTE — CONSULT NOTE ADULT - ATTENDING COMMENTS
Discussed need for long-term cardiac rhythm monitoring. Had a detailed discussion about options of MCOT+ ILR vs ILR. AFter evaluating pros-cons, patient/family opted for ILR, which is a reasonable approach. We will proceed with ILR placement.  Neuro f-up

## 2022-03-07 NOTE — DISCHARGE NOTE PROVIDER - HOSPITAL COURSE
Hospital course:  56M with PMH of HTN, DLD, DM type 2, right eye blindness (traumatic), diabetic neuropathy, gout, bilateral wrist surgeries, and genital herpes who presented to the hospital with left arm weakness. Code Stroke was activated in the ED. CT head was negative for any hemorrhage, and CT angio head was negative for any large vessel occlusion. TPA was administered and patient was admitted to the neurocritical care unit for post-TPA monitoring. Post-TPA CT head showed two new focal hypo-densities, one in the region of the right caudate nucleus and one in the high right frontal region, representing new small focal infarcts. Patient still endorses some weakness in the left 4/5th digits, which has improved since presentation. Given new CT findings, the patient will be downgraded to the stroke unit with Q4H neuro checks.      During this hospital course, patient had acute infarcts in the right caudate and high right frontal lobe  The stroke etiology is likely secondary to:  - Small vessel disease from atherosclerotic risk factors vs cardiac arrhythmia    Patient had the following workup done in house:  - CTH: negative  - CTA: negative  - Repeat CTH: two focal hypodensities in region of the right caudate nucleus and right frontal lobe  - MR Head: Small acute infarcts in the right caudate and high right precentral   gyrus  ROMINA: no PFO, no thrombus    Physical exam at discharge:  Left hand 4th and 5th digit weakness    New medications on discharge: aspirin, plavix, atorvastatin    Patient is stable for discharge home with outpatient follow up with Neurology and Cardiology   Hospital course:  56M with PMH of HTN, DLD, DM type 2, right eye blindness (traumatic), diabetic neuropathy, gout, bilateral wrist surgeries, and genital herpes who presented to the hospital with left arm weakness. Code Stroke was activated in the ED. CT head was negative for any hemorrhage, and CT angio head was negative for any large vessel occlusion. TPA was administered and patient was admitted to the neurocritical care unit for post-TPA monitoring. Post-TPA CT head showed two new focal hypo-densities, one in the region of the right caudate nucleus and one in the high right frontal region, representing new small focal infarcts. Patient still endorses some weakness in the left 4/5th digits, which has improved since presentation. Given new CT findings, the patient will be downgraded to the stroke unit with Q4H neuro checks.      During this hospital course, patient had acute infarcts in the right caudate and high right frontal lobe  The stroke etiology is likely secondary to:  - Small vessel disease from atherosclerotic risk factors vs cardiac arrhythmia    Patient had the following workup done in house:  - CTH: negative  - CTA: negative  - Repeat CTH: two focal hypodensities in region of the right caudate nucleus and right frontal lobe  - MR Head: Small acute infarcts in the right caudate and high right precentral   gyrus  ROMINA: no PFO, no thrombus    Physical exam at discharge:  Left hand 4th and 5th digit weakness    New medications on discharge: aspirin, plavix, atorvastatin    Patient is stable for discharge home with outpatient follow up with Neurology and Cardiology      Stroke Attending Attestation:    Pt is a 55 yo M with PMHx of HTN, HLD, DMII, baseline right eye blindness (2/2 traumatic event), who presented with left upper extremity paresis. S/p IV alteplase on 3/3.    Impr: acute ischemic strokes in right corona radiata and right cortical parietal region   CTA head/neck without evidence of significant flow limiting stenosis.   Concern for cardioembolic etiology.   ROMINA without evidence of source or shunt.  S/p ILR placement  PTA none, DAPT x 21 then ASA monotherapy in addition to high intensity statin  Likely d/c home with outpatient PT  F/u in stroke clinic in 2-3 weeks .

## 2022-03-07 NOTE — DISCHARGE NOTE PROVIDER - NSDCFUADDAPPT_GEN_ALL_CORE_FT
Larry have a thyroid ultrasound performed outpatient to follow up on your thyroid goiter noted on CatScan

## 2022-03-07 NOTE — CONSULT NOTE ADULT - SUBJECTIVE AND OBJECTIVE BOX
Patient is a 56y old  Male who presents with a chief complaint of left arm weakness (07 Mar 2022 15:10)        HPI:  56 year old male with past medical history of HTN, DLD, DM, right eye blindness (traumatic), diabetic neuropathy, gout, bilateral wrist surgeries, genital herpes presented with left arm weakness since 7 15am.    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 He pulled the car to the side and was later was send to ED on advice of the support staff as the arm did not get better. He denies any slurred speech, vision loss, loss of consciousness, leg weakness, chest pain, abdominal pain, nausea, vomiting, diarrhea, constipation or any other complaints.    In ED patient hemodynamically stable, afebrile, stroke code activated and CT Head negative for any hemorrhage, CTA Head negative for any large vessel occlusion, tpa administered and patient admitted to Neurocritical care unit.     NIH Score on admission: 1   Current NIH Score: 0 (03 Mar 2022 09:42)      EP consulted for loop for CVA.     REVIEW OF SYSTEMS    [x] A ten-point review of systems was otherwise negative except as noted.  [ ] Due to altered mental status/intubation, subjective information were not able to be obtained from the patient. History was obtained, to the extent possible, from review of the chart and collateral sources of information.      PAST MEDICAL & SURGICAL HISTORY:      Home Medications:  acyclovir 400 mg oral tablet: 1 tab(s) orally 2 times a day (03 Mar 2022 16:07)  allopurinol 300 mg oral tablet: 1 tab(s) orally once a day (03 Mar 2022 16:07)  amLODIPine 10 mg oral tablet: 1 tab(s) orally once a day (03 Mar 2022 16:07)  atorvastatin 40 mg oral tablet: 1 tab(s) orally once a day (03 Mar 2022 16:07)  hydroCHLOROthiazide 12.5 mg oral capsule: 1 cap(s) orally once a day (03 Mar 2022 16:07)  lisinopril 40 mg oral tablet: 1 tab(s) orally once a day (03 Mar 2022 16:07)  pioglitazone 30 mg oral tablet: 1 tab(s) orally once a day (03 Mar 2022 16:07)  repaglinide 2 mg oral tablet: 1 tab(s) orally 3 times a day (before meals) (03 Mar 2022 16:07)  Synjardy 12.5 mg-1000 mg oral tablet: 1 tab(s) orally 2 times a day (03 Mar 2022 16:07)  tamsulosin 0.4 mg oral capsule: 1 cap(s) orally once a day (03 Mar 2022 16:07)      Allergies:    Allergy Status Unknown      FAMILY HISTORY:      SOCIAL HISTORY:  CIGARETTES:  ALCOHOL:    MEDICATIONS  (STANDING):  acyclovir   Oral Tab/Cap 400 milliGRAM(s) Oral two times a day  allopurinol 300 milliGRAM(s) Oral daily  amLODIPine   Tablet 10 milliGRAM(s) Oral daily  atorvastatin 80 milliGRAM(s) Oral at bedtime  chlorhexidine 4% Liquid 1 Application(s) Topical <User Schedule>  clopidogrel Tablet 75 milliGRAM(s) Oral daily  gabapentin 100 milliGRAM(s) Oral every 8 hours  heparin   Injectable 5000 Unit(s) SubCutaneous every 8 hours  hydrochlorothiazide 25 milliGRAM(s) Oral at bedtime  influenza   Vaccine 0.5 milliLiter(s) IntraMuscular once  insulin glargine Injectable (LANTUS) 30 Unit(s) SubCutaneous at bedtime  insulin lispro (ADMELOG) corrective regimen sliding scale   SubCutaneous three times a day before meals  insulin lispro Injectable (ADMELOG) 12 Unit(s) SubCutaneous three times a day before meals  lisinopril 40 milliGRAM(s) Oral daily  pantoprazole    Tablet 40 milliGRAM(s) Oral before breakfast  senna 2 Tablet(s) Oral at bedtime  tamsulosin 0.4 milliGRAM(s) Oral at bedtime    MEDICATIONS  (PRN):      Vital Signs Last 24 Hrs  T(C): 36.3 (07 Mar 2022 13:39), Max: 36.8 (06 Mar 2022 17:52)  T(F): 97.3 (07 Mar 2022 13:39), Max: 98.2 (06 Mar 2022 17:52)  HR: 86 (07 Mar 2022 12:00) (69 - 86)  BP: 137/79 (07 Mar 2022 12:00) (132/75 - 156/88)  BP(mean): 101 (07 Mar 2022 12:00) (89 - 133)  RR: 18 (07 Mar 2022 12:00) (18 - 19)  SpO2: 98% (07 Mar 2022 12:00) (95% - 98%)    PHYSICAL EXAM:    GENERAL: In no apparent distress, well nourished, and hydrated.  HEART: Regular rate and rhythm; No murmurs, rubs, or gallops.  PULMONARY: Clear to auscultation and perfusion.  No rales, wheezing, or rhonchi bilaterally.  ABDOMEN: Soft, Nontender, Nondistended; Bowel sounds present  EXTREMITIES:  2+ Peripheral Pulses, No clubbing, cyanosis, or edema  NEUROLOGICAL: AO x4, HARRY, speech clear    INTERPRETATION OF TELEMETRY: SR 85 bpm    ECG:  < from: 12 Lead ECG (03.03.22 @ 11:15) >    Ventricular Rate 74 BPM    Atrial Rate 74 BPM    P-R Interval 224 ms    QRS Duration 120 ms    Q-T Interval 402 ms    QTC Calculation(Bazett) 446 ms    P Axis 15 degrees    R Axis 161 degrees    T Axis 155 degrees    Diagnosis Line Sinus rhythm with 1st degree A-V block  Right bundle branch block  Left posterior fascicular block  *** Bifascicular block ***  Possible Inferior infarct , age undetermined  Anterior infarct , age undetermined  T wave abnormality, consider lateral ischemia  Abnormal ECG    < end of copied text >    I&O's Detail    06 Mar 2022 07:01  -  07 Mar 2022 07:00  --------------------------------------------------------  IN:    Oral Fluid: 820 mL  Total IN: 820 mL    OUT:    Voided (mL): 2000 mL  Total OUT: 2000 mL    Total NET: -1180 mL      07 Mar 2022 07:01  -  07 Mar 2022 16:18  --------------------------------------------------------  IN:  Total IN: 0 mL    OUT:    Voided (mL): 1050 mL  Total OUT: 1050 mL    Total NET: -1050 mL          LABS:                        13.9   5.49  )-----------( 225      ( 07 Mar 2022 04:30 )             45.5     03-07    141  |  104  |  22<H>  ----------------------------<  193<H>  3.9   |  24  |  1.5    Ca    9.2      07 Mar 2022 04:30  Phos  4.0     03-07  Mg     2.0     03-07    TPro  6.6  /  Alb  4.1  /  TBili  0.4  /  DBili  x   /  AST  25  /  ALT  28  /  AlkPhos  58  03-07        PT/INR - ( 07 Mar 2022 04:30 )   PT: 11.50 sec;   INR: 1.00 ratio         PTT - ( 07 Mar 2022 04:30 )  PTT:47.6 sec  BNP      I&O's Detail    06 Mar 2022 07:01  -  07 Mar 2022 07:00  --------------------------------------------------------  IN:    Oral Fluid: 820 mL  Total IN: 820 mL    OUT:    Voided (mL): 2000 mL  Total OUT: 2000 mL    Total NET: -1180 mL      07 Mar 2022 07:01  -  07 Mar 2022 16:18  --------------------------------------------------------  IN:  Total IN: 0 mL    OUT:    Voided (mL): 1050 mL  Total OUT: 1050 mL    Total NET: -1050 mL      RADIOLOGY:  < from: Transesophageal Echocardiogram (03.07.22 @ 08:30) >  Summary:   1. Normal global left ventricular systolic function.   2. Moderately increased LV wall thickness.   3. Mildly enlarged left atrium.   4. Normal right atrial size.   5. Mild mitral valve regurgitation.   6. Aorta at level of sinuses was 3.7.   7. Color flow doppler and intravenous injection of agitated saline   demonstrates the presence of an intact intra atrial septum.    < end of copied text >   Patient is a 56y old  Male who presents with a chief complaint of left arm weakness (07 Mar 2022 15:10)    HPI:  56 year old male with past medical history of HTN, DLD, DM, right eye blindness (traumatic), diabetic neuropathy, gout, bilateral wrist surgeries, genital herpes presented with left arm weakness since 7 15am.    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 He pulled the car to the side and was later was send to ED on advice of the support staff as the arm did not get better. He denies any slurred speech, vision loss, loss of consciousness, leg weakness, chest pain, abdominal pain, nausea, vomiting, diarrhea, constipation or any other complaints.    In ED patient hemodynamically stable, afebrile, stroke code activated and CT Head negative for any hemorrhage, CTA Head negative for any large vessel occlusion, tpa administered and patient admitted to Neurocritical care unit.     NIH Score on admission: 1   Current NIH Score: 0 (03 Mar 2022 09:42)      EP consulted for loop for CVA.     REVIEW OF SYSTEMS    [x] A ten-point review of systems was otherwise negative except as noted.  [ ] Due to altered mental status/intubation, subjective information were not able to be obtained from the patient. History was obtained, to the extent possible, from review of the chart and collateral sources of information.      PAST MEDICAL & SURGICAL HISTORY:      Home Medications:  acyclovir 400 mg oral tablet: 1 tab(s) orally 2 times a day (03 Mar 2022 16:07)  allopurinol 300 mg oral tablet: 1 tab(s) orally once a day (03 Mar 2022 16:07)  amLODIPine 10 mg oral tablet: 1 tab(s) orally once a day (03 Mar 2022 16:07)  atorvastatin 40 mg oral tablet: 1 tab(s) orally once a day (03 Mar 2022 16:07)  hydroCHLOROthiazide 12.5 mg oral capsule: 1 cap(s) orally once a day (03 Mar 2022 16:07)  lisinopril 40 mg oral tablet: 1 tab(s) orally once a day (03 Mar 2022 16:07)  pioglitazone 30 mg oral tablet: 1 tab(s) orally once a day (03 Mar 2022 16:07)  repaglinide 2 mg oral tablet: 1 tab(s) orally 3 times a day (before meals) (03 Mar 2022 16:07)  Synjardy 12.5 mg-1000 mg oral tablet: 1 tab(s) orally 2 times a day (03 Mar 2022 16:07)  tamsulosin 0.4 mg oral capsule: 1 cap(s) orally once a day (03 Mar 2022 16:07)      Allergies:    Allergy Status Unknown      FAMILY HISTORY:      SOCIAL HISTORY:  Denies any smoking, alcohol or illicit drug use      MEDICATIONS  (STANDING):  acyclovir   Oral Tab/Cap 400 milliGRAM(s) Oral two times a day  allopurinol 300 milliGRAM(s) Oral daily  amLODIPine   Tablet 10 milliGRAM(s) Oral daily  atorvastatin 80 milliGRAM(s) Oral at bedtime  chlorhexidine 4% Liquid 1 Application(s) Topical <User Schedule>  clopidogrel Tablet 75 milliGRAM(s) Oral daily  gabapentin 100 milliGRAM(s) Oral every 8 hours  heparin   Injectable 5000 Unit(s) SubCutaneous every 8 hours  hydrochlorothiazide 25 milliGRAM(s) Oral at bedtime  influenza   Vaccine 0.5 milliLiter(s) IntraMuscular once  insulin glargine Injectable (LANTUS) 30 Unit(s) SubCutaneous at bedtime  insulin lispro (ADMELOG) corrective regimen sliding scale   SubCutaneous three times a day before meals  insulin lispro Injectable (ADMELOG) 12 Unit(s) SubCutaneous three times a day before meals  lisinopril 40 milliGRAM(s) Oral daily  pantoprazole    Tablet 40 milliGRAM(s) Oral before breakfast  senna 2 Tablet(s) Oral at bedtime  tamsulosin 0.4 milliGRAM(s) Oral at bedtime    MEDICATIONS  (PRN):      Vital Signs Last 24 Hrs  T(C): 36.3 (07 Mar 2022 13:39), Max: 36.8 (06 Mar 2022 17:52)  T(F): 97.3 (07 Mar 2022 13:39), Max: 98.2 (06 Mar 2022 17:52)  HR: 86 (07 Mar 2022 12:00) (69 - 86)  BP: 137/79 (07 Mar 2022 12:00) (132/75 - 156/88)  BP(mean): 101 (07 Mar 2022 12:00) (89 - 133)  RR: 18 (07 Mar 2022 12:00) (18 - 19)  SpO2: 98% (07 Mar 2022 12:00) (95% - 98%)    PHYSICAL EXAM:    GENERAL: In no apparent distress, well nourished, and hydrated.  HEART: Regular rate and rhythm; No murmurs, rubs, or gallops.  PULMONARY: Clear to auscultation and perfusion.  No rales, wheezing, or rhonchi bilaterally.  ABDOMEN: Soft, Nontender, Nondistended; Bowel sounds present  EXTREMITIES:  2+ Peripheral Pulses, No clubbing, cyanosis, or edema  NEUROLOGICAL: AO x4, follows commands    INTERPRETATION OF TELEMETRY: SR 85 bpm    ECG:  < from: 12 Lead ECG (03.03.22 @ 11:15) >    Ventricular Rate 74 BPM    Atrial Rate 74 BPM    P-R Interval 224 ms    QRS Duration 120 ms    Q-T Interval 402 ms    QTC Calculation(Bazett) 446 ms    P Axis 15 degrees    R Axis 161 degrees    T Axis 155 degrees    Diagnosis Line Sinus rhythm with 1st degree A-V block  Right bundle branch block  Left posterior fascicular block  *** Bifascicular block ***  Possible Inferior infarct , age undetermined  Anterior infarct , age undetermined  T wave abnormality, consider lateral ischemia  Abnormal ECG    < end of copied text >    I&O's Detail    06 Mar 2022 07:01  -  07 Mar 2022 07:00  --------------------------------------------------------  IN:    Oral Fluid: 820 mL  Total IN: 820 mL    OUT:    Voided (mL): 2000 mL  Total OUT: 2000 mL    Total NET: -1180 mL      07 Mar 2022 07:01  -  07 Mar 2022 16:18  --------------------------------------------------------  IN:  Total IN: 0 mL    OUT:    Voided (mL): 1050 mL  Total OUT: 1050 mL    Total NET: -1050 mL          LABS:                        13.9   5.49  )-----------( 225      ( 07 Mar 2022 04:30 )             45.5     03-07    141  |  104  |  22<H>  ----------------------------<  193<H>  3.9   |  24  |  1.5    Ca    9.2      07 Mar 2022 04:30  Phos  4.0     03-07  Mg     2.0     03-07    TPro  6.6  /  Alb  4.1  /  TBili  0.4  /  DBili  x   /  AST  25  /  ALT  28  /  AlkPhos  58  03-07        PT/INR - ( 07 Mar 2022 04:30 )   PT: 11.50 sec;   INR: 1.00 ratio         PTT - ( 07 Mar 2022 04:30 )  PTT:47.6 sec  BNP      I&O's Detail    06 Mar 2022 07:01  -  07 Mar 2022 07:00  --------------------------------------------------------  IN:    Oral Fluid: 820 mL  Total IN: 820 mL    OUT:    Voided (mL): 2000 mL  Total OUT: 2000 mL    Total NET: -1180 mL      07 Mar 2022 07:01  -  07 Mar 2022 16:18  --------------------------------------------------------  IN:  Total IN: 0 mL    OUT:    Voided (mL): 1050 mL  Total OUT: 1050 mL    Total NET: -1050 mL      RADIOLOGY:  < from: Transesophageal Echocardiogram (03.07.22 @ 08:30) >  Summary:   1. Normal global left ventricular systolic function.   2. Moderately increased LV wall thickness.   3. Mildly enlarged left atrium.   4. Normal right atrial size.   5. Mild mitral valve regurgitation.   6. Aorta at level of sinuses was 3.7.   7. Color flow doppler and intravenous injection of agitated saline   demonstrates the presence of an intact intra atrial septum.    < end of copied text >    < from: MR Head No Cont (03.05.22 @ 09:33) >  IMPRESSION:    Small acute infarcts in the right caudate and high right precentral   gyrus. No intracranial hemorrhage, midline shift, or extra-axial fluid   collection.    Mild chronic microvascular ischemic changes.    < end of copied text >

## 2022-03-07 NOTE — CONSULT NOTE ADULT - ASSESSMENT
56 year old male with past medical history of HTN, DLD, DM presented with left arm weakness since 0715.    Acute ischemic stroke s/p tpa  - CT Head post-TPA significant for two focal infarcts  - Neuro checks q4  - Continue Atorvastatin 80 daily  - antiplatelets as per neuro  - Maintain BP<180  - PT/OT/Physiatry/Speech eval  - Stroke labs: TSH nl, Lipid profile LDL 74, A1c 8.8  - MRI Head with 2 new infarcts  - awaiting ROMINA    HTN  - Continue home dose Lisinopril and Amlodipine  - HCTZ resumed yesterday  - Target goal at discharge less than 140  - Normovolemia  - Echo with bubble study normal  - ROMINA scheduled for Monday 3/7/22, NPO after midnight Sunday  - troponin 0.03, repeat 0.03, no chest pain reported  - Tele monitoring  - outpt ischemic workup as high probability of CAD    Uncontrolled DM  - Uptitrate insulin for better control  - Carb consistent diet  - Was on Insulin 40 units at home in the past (stopped himself)  - POCT, ISS,  -180    Gout  - Continue Allopurinol    H/o Herpes  - Continue Acyclovir prophylaxis for herpes recurrence    HLD  -HD statin    Peripheral neuropathy  -Neurontin    DVT Px: Heparin SQ, SCDs    Code Status: Full code    #Progress Note Handoff  Pending: Consults____Clinical improvement and stability__x___Tests___TEE, ?ILR_____PT____x____  Pt/Family discussion: Pt informed and agrees with the current plan  Disposition: Home    My note supersedes the residents note should a discrepancy arise.    Chart and notes personally reviewed.  Care Discussed with Consultants/Other Providers/ Housestaff [ x] YES [ ] NO   Radiology, labs, old records personally reviewed.    discussed w/ housestaff, nursing, case management, neuro team  
IMPRESSION: Rehab of r/o CVA    PRECAUTIONS: [   ] Cardiac  [   ] Respiratory  [   ] Seizures [   ] Contact Isolation  [   ] Droplet Isolation  [   ] Other    Weight Bearing Status:     RECOMMENDATION:    Out of Bed to Chair     DVT/Decubiti Prophylaxis    REHAB PLAN:     [ x   ] Bedside P/T 3-5 times a week   [ x   ]   Bedside O/T  2-3 times a week             [    ] Speech Therapy               [    ]  No Rehab Therapy Indicated   Conditioning/ROM                                    ADL  Bed Mobility                                               Conditioning/ROM  Transfers                                                     Bed Mobility  Sitting /Standing Balance                         Transfers                                        Gait Training                                               Sitting/Standing Balance  Stair Training [   ]Applicable                    Home equipment Eval                                                                        Splinting  [   ] Only      GOALS:   ADL   [   x ]   Independent                    Transfers  [  x  ] Independent                          Ambulation  [  x  ] Independent     [  x   ] With device                            [    ]  CG                                                         [    ]  CG                                                                  [    ] CG                            [    ] Min A                                                   [    ] Min A                                                              [    ] Min  A          DISCHARGE PLAN:   [    ]  Good candidate for Intensive Rehabilitation/Hospital based                                             Will tolerate 3hrs Intensive Rehab Daily                                       [     ]  Short Term Rehab in Skilled Nursing Facility                                       [x     ]  Home with Outpatient or VN services - outpatient OT in [place] per family request                                         [     ]  Possible Candidate for Intensive Hospital based Rehab                                       
EP c/s: Dr. Sarabia    56 year old male with past medical history of HTN, DLD, DM, right eye blindness (traumatic), diabetic neuropathy, gout, bilateral wrist surgeries, genital herpes presented with left arm weakness since 7 15am. Admitted with acute CVA, ROMINA neg for thrombus of PFO.    Impression:   Acute CVA s/p TPA ; Small acute infarcts in the right caudate and high right precentral   gyrus  Hx HTN, HLD  Hx DM  COVID neg 3/5    Plan:  - Recommend implantable loop recorder for long term cardiac monitoring  - Discussed with pt and wife at [facility], in agreement with plan  - Plan for tomorrow  - Does not need to be NPO  - Cont tele for now    EPS [id number]

## 2022-03-08 ENCOUNTER — TRANSCRIPTION ENCOUNTER (OUTPATIENT)
Age: 57
End: 2022-03-08

## 2022-03-08 VITALS
RESPIRATION RATE: 16 BRPM | SYSTOLIC BLOOD PRESSURE: 139 MMHG | DIASTOLIC BLOOD PRESSURE: 77 MMHG | TEMPERATURE: 98 F | HEART RATE: 81 BPM

## 2022-03-08 LAB
ALBUMIN SERPL ELPH-MCNC: 4 G/DL — SIGNIFICANT CHANGE UP (ref 3.5–5.2)
ALP SERPL-CCNC: 56 U/L — SIGNIFICANT CHANGE UP (ref 30–115)
ALT FLD-CCNC: 38 U/L — SIGNIFICANT CHANGE UP (ref 0–41)
ANION GAP SERPL CALC-SCNC: 12 MMOL/L — SIGNIFICANT CHANGE UP (ref 7–14)
AST SERPL-CCNC: 31 U/L — SIGNIFICANT CHANGE UP (ref 0–41)
BASOPHILS # BLD AUTO: 0.04 K/UL — SIGNIFICANT CHANGE UP (ref 0–0.2)
BASOPHILS NFR BLD AUTO: 0.6 % — SIGNIFICANT CHANGE UP (ref 0–1)
BILIRUB SERPL-MCNC: 0.4 MG/DL — SIGNIFICANT CHANGE UP (ref 0.2–1.2)
BUN SERPL-MCNC: 24 MG/DL — HIGH (ref 10–20)
CALCIUM SERPL-MCNC: 9.1 MG/DL — SIGNIFICANT CHANGE UP (ref 8.5–10.1)
CHLORIDE SERPL-SCNC: 105 MMOL/L — SIGNIFICANT CHANGE UP (ref 98–110)
CO2 SERPL-SCNC: 26 MMOL/L — SIGNIFICANT CHANGE UP (ref 17–32)
CREAT SERPL-MCNC: 1.5 MG/DL — SIGNIFICANT CHANGE UP (ref 0.7–1.5)
EGFR: 54 ML/MIN/1.73M2 — LOW
EOSINOPHIL # BLD AUTO: 0.27 K/UL — SIGNIFICANT CHANGE UP (ref 0–0.7)
EOSINOPHIL NFR BLD AUTO: 4.4 % — SIGNIFICANT CHANGE UP (ref 0–8)
GLUCOSE BLDC GLUCOMTR-MCNC: 157 MG/DL — HIGH (ref 70–99)
GLUCOSE BLDC GLUCOMTR-MCNC: 170 MG/DL — HIGH (ref 70–99)
GLUCOSE BLDC GLUCOMTR-MCNC: 283 MG/DL — HIGH (ref 70–99)
GLUCOSE SERPL-MCNC: 187 MG/DL — HIGH (ref 70–99)
HCT VFR BLD CALC: 45.1 % — SIGNIFICANT CHANGE UP (ref 42–52)
HGB BLD-MCNC: 13.3 G/DL — LOW (ref 14–18)
IMM GRANULOCYTES NFR BLD AUTO: 2.1 % — HIGH (ref 0.1–0.3)
LYMPHOCYTES # BLD AUTO: 2.41 K/UL — SIGNIFICANT CHANGE UP (ref 1.2–3.4)
LYMPHOCYTES # BLD AUTO: 39.1 % — SIGNIFICANT CHANGE UP (ref 20.5–51.1)
MCHC RBC-ENTMCNC: 19.9 PG — LOW (ref 27–31)
MCHC RBC-ENTMCNC: 29.5 G/DL — LOW (ref 32–37)
MCV RBC AUTO: 67.4 FL — LOW (ref 80–94)
MONOCYTES # BLD AUTO: 0.74 K/UL — HIGH (ref 0.1–0.6)
MONOCYTES NFR BLD AUTO: 12 % — HIGH (ref 1.7–9.3)
NEUTROPHILS # BLD AUTO: 2.58 K/UL — SIGNIFICANT CHANGE UP (ref 1.4–6.5)
NEUTROPHILS NFR BLD AUTO: 41.8 % — LOW (ref 42.2–75.2)
NRBC # BLD: 0 /100 WBCS — SIGNIFICANT CHANGE UP (ref 0–0)
PLATELET # BLD AUTO: 227 K/UL — SIGNIFICANT CHANGE UP (ref 130–400)
POTASSIUM SERPL-MCNC: 4.1 MMOL/L — SIGNIFICANT CHANGE UP (ref 3.5–5)
POTASSIUM SERPL-SCNC: 4.1 MMOL/L — SIGNIFICANT CHANGE UP (ref 3.5–5)
PROT SERPL-MCNC: 6.3 G/DL — SIGNIFICANT CHANGE UP (ref 6–8)
RBC # BLD: 6.69 M/UL — HIGH (ref 4.7–6.1)
RBC # FLD: 17.9 % — HIGH (ref 11.5–14.5)
SODIUM SERPL-SCNC: 143 MMOL/L — SIGNIFICANT CHANGE UP (ref 135–146)
WBC # BLD: 6.17 K/UL — SIGNIFICANT CHANGE UP (ref 4.8–10.8)
WBC # FLD AUTO: 6.17 K/UL — SIGNIFICANT CHANGE UP (ref 4.8–10.8)

## 2022-03-08 PROCEDURE — 99232 SBSQ HOSP IP/OBS MODERATE 35: CPT

## 2022-03-08 PROCEDURE — 33285 INSJ SUBQ CAR RHYTHM MNTR: CPT

## 2022-03-08 RX ADMIN — CHLORHEXIDINE GLUCONATE 1 APPLICATION(S): 213 SOLUTION TOPICAL at 05:30

## 2022-03-08 RX ADMIN — CLOPIDOGREL BISULFATE 75 MILLIGRAM(S): 75 TABLET, FILM COATED ORAL at 11:50

## 2022-03-08 RX ADMIN — AMLODIPINE BESYLATE 10 MILLIGRAM(S): 2.5 TABLET ORAL at 05:29

## 2022-03-08 RX ADMIN — LISINOPRIL 40 MILLIGRAM(S): 2.5 TABLET ORAL at 05:29

## 2022-03-08 RX ADMIN — Medication 12 UNIT(S): at 16:34

## 2022-03-08 RX ADMIN — GABAPENTIN 100 MILLIGRAM(S): 400 CAPSULE ORAL at 05:30

## 2022-03-08 RX ADMIN — Medication 4: at 11:51

## 2022-03-08 RX ADMIN — Medication 2: at 16:33

## 2022-03-08 RX ADMIN — GABAPENTIN 100 MILLIGRAM(S): 400 CAPSULE ORAL at 13:07

## 2022-03-08 RX ADMIN — Medication 12 UNIT(S): at 11:51

## 2022-03-08 RX ADMIN — Medication 81 MILLIGRAM(S): at 11:50

## 2022-03-08 RX ADMIN — Medication 2: at 07:33

## 2022-03-08 RX ADMIN — Medication 400 MILLIGRAM(S): at 16:31

## 2022-03-08 RX ADMIN — PANTOPRAZOLE SODIUM 40 MILLIGRAM(S): 20 TABLET, DELAYED RELEASE ORAL at 06:02

## 2022-03-08 RX ADMIN — HEPARIN SODIUM 5000 UNIT(S): 5000 INJECTION INTRAVENOUS; SUBCUTANEOUS at 13:07

## 2022-03-08 RX ADMIN — Medication 400 MILLIGRAM(S): at 05:29

## 2022-03-08 RX ADMIN — Medication 12 UNIT(S): at 07:33

## 2022-03-08 RX ADMIN — HEPARIN SODIUM 5000 UNIT(S): 5000 INJECTION INTRAVENOUS; SUBCUTANEOUS at 05:30

## 2022-03-08 RX ADMIN — Medication 300 MILLIGRAM(S): at 11:50

## 2022-03-08 NOTE — PROGRESS NOTE ADULT - TIME BILLING
time spent on review of labs and imaging studies, obtaining history, personally examining patient, counselling and communicating with patient/ family, entering orders for medications/tests/etc, discussions with other health care providers, documentation in electronic health records, independent interpretation of labs, imaging/procedure results and care coordination.
Review of imaging and chart; obtaining history; examination of pt; discussion and coordination of care.
Review of imaging and chart; obtaining history; examination of pt; discussion and coordination of care.
time spent on review of labs and imaging studies, obtaining history, personally examining patient, counselling and communicating with patient/ family, entering orders for medications/tests/etc, discussions with other health care providers, documentation in electronic health records, independent interpretation of labs, imaging/procedure results and care coordination.

## 2022-03-08 NOTE — PROGRESS NOTE ADULT - ATTENDING COMMENTS
Pt is a 55 yo M with PMHx of HTN, HLD, DMII, baseline right eye blindness (2/2 traumatic event), who presented with left upper extremity paresis. S/p IV alteplase on 3/3.    Impr: acute ischemic strokes in right corona radiata and right cortical parietal region   CTA head/neck without evidence of significant flow limiting stenosis.   Concern for cardioembolic etiology.   ROMINA without evidence of source or shunt.  S/p ILR placement  PTA none, DAPT x 21 then ASA monotherapy in addition to high intensity statin  Likely d/c home tomorrow with outpatient PT  F/u in stroke clinic in 2-3 weeks
55 yo RHM w/ vascular risks p/w acute LUE weakness s/p IV-TPA with improvement still has residual LUE drift/ataxia.  MRI with 2 acute infarcts R frontal/parietal.  r/o cardioembolic source.  Recommendations as above.
Pt is a 55 yo M with PMHx of HTN, HLD< DMII, baseline rihgt eye blindness (2/2 traumatic event), who presented with left upper extremity paresis. S/p IV alteplase on 3/3. MRI brain shows two ischemic strokes in right corona radiata and right cortical parietal region. CTA head/neck without evidence of significant flow limiting stenosis. concern for cardioembolic etiology. ROMINA without evidence of source or shunt. Plan for EP consult for ILR placement. Discussed driving restrictions with pt and wife at bedside. PTA none, DAPT x 21 then ASA monotherapy in addition to high intensity statin. Likely d/c home tomorrow with outpatient therapy. F/u in stroke clinic in 2-3 weeks.
56 year old gentleman, PMHx/risk factors as outlined above, s/p IV tPA for acute ischemic stroke, presented with acute onset of left arm weakness while driving. Exam is consistent with acute embolic stroke in R distal MCA territory in hand area.   Neurologic and systemic vice paln of care as outlined above,  MRI confirmed R distal MCA/head of caudate embolic stroke: started on ASA/heparin for DVT proph: will get ROMINA for further work up
56 year old gentleman, PMHx/risk factors as outlined above, s/p IV tPA for acute ischemic stroke, presented with acute onset of left arm weakness while driving. Exam is consistent with acute embolic stroke in R distal MCA territory in hand area.   Neurologic and systemic vice paln of care as outlined above,  specifically, regimen for BP and diabetic control further optimized  Neurochecks to be changed to q 4 hour if stability CT is negative

## 2022-03-08 NOTE — PROGRESS NOTE ADULT - ASSESSMENT
56 year old male with past medical history of HTN, DLD, DM presented with left arm weakness since 0715.    Acute ischemic stroke s/p tpa  - CT Head post-TPA significant for two focal infarcts  - Neuro checks q4  - Continue Atorvastatin 80 daily  - antiplatelets as per neuro  - PT/OT/Physiatry/Speech eval  - Stroke labs: TSH nl, Lipid profile LDL 74, A1c 8.8  - MRI Head with 2 new infarcts  - ROMINA w/out thrombus or shunt  - EPS for ILR    HTN  - Continue home dose Lisinopril and Amlodipine  - HCTZ resumed   - Target goal at discharge less than 140  - Normovolemia  - Echo with bubble study normal  - ROMINA scheduled for Monday 3/7/22, NPO after midnight Sunday  - troponin 0.03, repeat 0.03, no chest pain reported  - Tele monitoring  - outpt ischemic workup as high probability of CAD (pt/spouse aware)    Uncontrolled DM  - Uptitrate insulin for better control  - Carb consistent diet  - Was on Insulin 40 units at home in the past (stopped himself)  - POCT, ISS,  -180  - counselled about importance of tight control    Gout  - Continue Allopurinol    H/o Herpes  - Continue Acyclovir prophylaxis for herpes recurrence    HLD  -HD statin    Peripheral neuropathy  -Neurontin    CKD3  stable    Microcytic anemia  -outpt workup      DVT Px: Heparin SQ, SCDs    Counselled pt about follow up, meds, etc. Pt verbalized understanding.    My note supersedes the residents note should a discrepancy arise.    Chart and notes personally reviewed.  Care Discussed with Consultants/Other Providers/ Housestaff [ x] YES [ ] NO   Radiology, labs, old records personally reviewed.    discussed w/ housestaff, nursing, case management, neuro team   56 year old male with past medical history of HTN, DLD, DM presented with left arm weakness since 0715.    Acute ischemic stroke s/p tpa  - CT Head post-TPA significant for two focal infarcts  - Neuro checks q4  - Continue Atorvastatin 80 daily  - antiplatelets as per neuro  - PT/OT/Physiatry/Speech eval  - Stroke labs: TSH nl, Lipid profile LDL 74, A1c 8.8  - MRI Head with 2 new infarcts  - ROMINA w/out thrombus or shunt  - EPS for ILR    HTN  - Continue home dose Lisinopril and Amlodipine  - HCTZ resumed   - Target goal at discharge less than 140  - Normovolemia  - Echo with bubble study normal  - ROMINA scheduled for Monday 3/7/22, NPO after midnight Sunday  - troponin 0.03, repeat 0.03, no chest pain reported  - Tele monitoring  - outpt ischemic workup as high probability of CAD (pt/spouse aware)    Uncontrolled DM  - Uptitrate insulin for better control  - Carb consistent diet  - Was on Insulin 40 units at home in the past (stopped himself)  - POCT, ISS,  -180  - counselled about importance of tight control    Gout  - Continue Allopurinol    H/o Herpes  - Continue Acyclovir prophylaxis for herpes recurrence    HLD  -HD statin    Peripheral neuropathy  -Neurontin    CKD3  stable    Thyroid nodules  -outpt U/s    Microcytic anemia  -outpt workup      DVT Px: Heparin SQ, SCDs    Counselled pt about follow up, meds, etc. Pt verbalized understanding.    My note supersedes the residents note should a discrepancy arise.    Chart and notes personally reviewed.  Care Discussed with Consultants/Other Providers/ Housestaff [ x] YES [ ] NO   Radiology, labs, old records personally reviewed.    discussed w/ housestaff, nursing, case management, neuro team

## 2022-03-08 NOTE — PROGRESS NOTE ADULT - SUBJECTIVE AND OBJECTIVE BOX
MELANIE PIKE  56y  Male    Patient is a 56y old  Male who presents with a chief complaint of left arm weakness (06 Mar 2022 09:01)      HPI:  56 year old male with past medical history of HTN, DLD, DM, right eye blindness (traumatic), diabetic neuropathy, gout, bilateral wrist surgeries, genital herpes presented with left arm weakness since 7 15am.    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 He pulled the car to the side and was later was send to ED on advice of the support staff as the arm did not get better. He denies any slurred speech, vision loss, loss of consciousness, leg weakness, chest pain, abdominal pain, nausea, vomiting, diarrhea, constipation or any other complaints.    In ED patient hemodynamically stable, afebrile, stroke code activated and CT Head negative for any hemorrhage, CTA Head negative for any large vessel occlusion, tpa administered and patient admitted to Neurocritical care unit.     NIH Score on admission: 1   Current NIH Score: 0 (03 Mar 2022 09:42)    S: Patient was examined and seen at bedside. This morning pt is resting comfortably in bed and reports no new issues or overnight events. No complaints, feels better. Improved strength in LUE.   Denies CP, SOB, N/V/D/C/AP, cough, F, chills, dizziness, new focal weakness, HA, vision changes, dysuria, or urinary symptoms, blood in stool.  ROS: all other systems reviewed and are negative    PAST MEDICAL & SURGICAL HISTORY:    SOCIAL HISTORY:  Tobacco: denies  Illicit drugs: denies  Alcohol: social  Family history reviewed and otherwise non-contributory  ALLERGIES: NKDA      General: NAD. Looks stated age.  HEENT: clean oropharynx, Rt ptosis, Et eye blindness (old) no LAD  Neck: trachea midline, no thyromegaly  CV: nl S1 S2; no m/r/g  Resp: decreased breath sounds at base  GI: NT/ND/S +BS  MS: no clubbing/cyanosis/edema, +pulses  Neuro: LUE 4+/5, LUE ataxia; rest 5/5, sensory intact; + reflexes  Skin: no rashes, nl turgor  Psychiatric: AA0x3 w/ intact insight and judgement    tele: SR, nonspecific changes (on my own evaluation of tele monitor)            MEDICATIONS  (STANDING):  acyclovir   Oral Tab/Cap 400 milliGRAM(s) Oral two times a day  allopurinol 300 milliGRAM(s) Oral daily  amLODIPine   Tablet 10 milliGRAM(s) Oral daily  aspirin enteric coated 81 milliGRAM(s) Oral daily  atorvastatin 80 milliGRAM(s) Oral at bedtime  chlorhexidine 4% Liquid 1 Application(s) Topical <User Schedule>  clopidogrel Tablet 75 milliGRAM(s) Oral daily  gabapentin 100 milliGRAM(s) Oral every 8 hours  heparin   Injectable 5000 Unit(s) SubCutaneous every 8 hours  hydrochlorothiazide 25 milliGRAM(s) Oral at bedtime  influenza   Vaccine 0.5 milliLiter(s) IntraMuscular once  insulin glargine Injectable (LANTUS) 30 Unit(s) SubCutaneous at bedtime  insulin lispro (ADMELOG) corrective regimen sliding scale   SubCutaneous three times a day before meals  insulin lispro Injectable (ADMELOG) 12 Unit(s) SubCutaneous three times a day before meals  lisinopril 40 milliGRAM(s) Oral daily  pantoprazole    Tablet 40 milliGRAM(s) Oral before breakfast  senna 2 Tablet(s) Oral at bedtime  tamsulosin 0.4 milliGRAM(s) Oral at bedtime    MEDICATIONS  (PRN):    Home Medications:  acyclovir 400 mg oral tablet: 1 tab(s) orally 2 times a day (03 Mar 2022 16:07)  allopurinol 300 mg oral tablet: 1 tab(s) orally once a day (03 Mar 2022 16:07)  amLODIPine 10 mg oral tablet: 1 tab(s) orally once a day (03 Mar 2022 16:07)  atorvastatin 40 mg oral tablet: 1 tab(s) orally once a day (03 Mar 2022 16:07)  hydroCHLOROthiazide 12.5 mg oral capsule: 1 cap(s) orally once a day (03 Mar 2022 16:07)  lisinopril 40 mg oral tablet: 1 tab(s) orally once a day (03 Mar 2022 16:07)  pioglitazone 30 mg oral tablet: 1 tab(s) orally once a day (03 Mar 2022 16:07)  repaglinide 2 mg oral tablet: 1 tab(s) orally 3 times a day (before meals) (03 Mar 2022 16:07)  Synjardy 12.5 mg-1000 mg oral tablet: 1 tab(s) orally 2 times a day (03 Mar 2022 16:07)  tamsulosin 0.4 mg oral capsule: 1 cap(s) orally once a day (03 Mar 2022 16:07)    Vital Signs Last 24 Hrs  T(C): 35.8 (08 Mar 2022 14:06), Max: 36.2 (08 Mar 2022 03:00)  T(F): 96.5 (08 Mar 2022 14:06), Max: 97.1 (08 Mar 2022 03:00)  HR: 97 (08 Mar 2022 14:06) (72 - 97)  BP: 130/71 (08 Mar 2022 14:06) (130/71 - 169/84)  BP(mean): 95 (08 Mar 2022 14:06) (95 - 125)  RR: 18 (08 Mar 2022 14:06) (18 - 18)  SpO2: 97% (08 Mar 2022 14:06) (97% - 98%)  CAPILLARY BLOOD GLUCOSE      POCT Blood Glucose.: 283 mg/dL (08 Mar 2022 11:47)  POCT Blood Glucose.: 170 mg/dL (08 Mar 2022 07:28)  POCT Blood Glucose.: 190 mg/dL (07 Mar 2022 21:11)  POCT Blood Glucose.: 261 mg/dL (07 Mar 2022 16:31)    LABS:                        13.3   6.17  )-----------( 227      ( 08 Mar 2022 06:10 )             45.1     03-08    143  |  105  |  24<H>  ----------------------------<  187<H>  4.1   |  26  |  1.5    Ca    9.1      08 Mar 2022 06:10  Phos  4.0     03-07  Mg     2.0     03-07    TPro  6.3  /  Alb  4.0  /  TBili  0.4  /  DBili  x   /  AST  31  /  ALT  38  /  AlkPhos  56  03-08    LIVER FUNCTIONS - ( 08 Mar 2022 06:10 )  Alb: 4.0 g/dL / Pro: 6.3 g/dL / ALK PHOS: 56 U/L / ALT: 38 U/L / AST: 31 U/L / GGT: x               PT/INR - ( 07 Mar 2022 04:30 )   PT: 11.50 sec;   INR: 1.00 ratio         PTT - ( 07 Mar 2022 04:30 )  PTT:47.6 sec            Consultant Notes Reviewed:  [x ] YES  [ ] NO  Care Discussed with Consultants/Other Providers/ Housestaff [ x] YES  [ ] NO  Radiology, labs, new studies personally reviewed.

## 2022-03-08 NOTE — PROGRESS NOTE ADULT - REASON FOR ADMISSION
left arm weakness

## 2022-03-08 NOTE — PROGRESS NOTE ADULT - SUBJECTIVE AND OBJECTIVE BOX
<<<RESIDENT DISCHARGE NOTE>>>     MELANIE PIKE  MRN-[id number]    No overnight events. Pt underwent successful placement of ILR and is now stable for discharge home.    VITAL SIGNS:  T(F): 96.5 (03-08-22 @ 14:06), Max: 97.1 (03-08-22 @ 03:00)  HR: 97 (03-08-22 @ 14:06)  BP: 130/71 (03-08-22 @ 14:06)  SpO2: 97% (03-08-22 @ 14:06)  Weight (kg): 99.8 (03-08-22 @ 15:53)  BMI (kg/m2): 30.7 (03-08-22 @ 15:53)    PHYSICAL EXAMINATION:  General:  Appearance is consistent with chronologic age.  No abnormal facies.  Gross skin survey within normal limits.    Cognitive/Language:  Awake, alert, and oriented to person, place, time and date.  Recent and remote memory intact.  Fund of knowledge is appropriate.  Naming, repetition and comprehension intact.   Nondysarthric.    Cranial Nerves  - Eyes: Right eye blindness with ptosis (baseline from traumatic event). Left eye visual acuity intact, visual fields full.  EOMI w/o nystagmus, skew or reported double vision.  PERRL.    - Face:  Facial sensation normal V1 - 3, no facial asymmetry.    - Ears/Nose/Throat:  Hearing grossly intact b/l to finger rub.  Palate elevates midline.  Tongue and uvula midline.   Motor examination:  Upper Extremities (proximal): L 5/5, R 5/5; Left hand  4/5 with 4th and 5th digit weakness; Lower extremities: L 5/5, R 5/5.  No observable drift. Normal tone and bulk. No tenderness, twitching, tremors or involuntary movements.  Sensory examination:   Intact to light touch throughout  Cerebellum:   FTN/HKS intact.  No dysmetria or dysdiadokinesia.  Gait narrow based and normal.    TEST RESULTS:                        13.3   6.17  )-----------( 227      ( 08 Mar 2022 06:10 )             45.1       03-08    143  |  105  |  24<H>  ----------------------------<  187<H>  4.1   |  26  |  1.5    Ca    9.1      08 Mar 2022 06:10  Phos  4.0     03-07  Mg     2.0     03-07    TPro  6.3  /  Alb  4.0  /  TBili  0.4  /  DBili  x   /  AST  31  /  ALT  38  /  AlkPhos  56  03-08      FINAL DISCHARGE INTERVIEW:  Resident(s) Present: (Name: Bj Skinner MD), RN Present: (Name: Satnam)    DISCHARGE MEDICATION RECONCILIATION  reviewed with Attending (Name: Ramu)    DISPOSITION:   [X] Home,    [  ] Home with Visiting Nursing Services,   [    ]  SNF/ NH,    [   ] Acute Rehab (4A),   [   ] Other

## 2022-03-08 NOTE — PROGRESS NOTE ADULT - PROVIDER SPECIALTY LIST ADULT
Neurology
Electrophysiology
Hospitalist
Neurology
Critical Care
Critical Care
Neurology
Hospitalist
Hospitalist

## 2022-03-08 NOTE — DISCHARGE NOTE NURSING/CASE MANAGEMENT/SOCIAL WORK - PATIENT PORTAL LINK FT
You can access the FollowMyHealth Patient Portal offered by Health system by registering at the following website: [email or web address]. By joining [employer]s FollowMyHealth portal, you will also be able to view your health information using other applications (apps) compatible with our system.

## 2022-03-08 NOTE — DISCHARGE NOTE NURSING/CASE MANAGEMENT/SOCIAL WORK - NSDCPEFALRISK_GEN_ALL_CORE
For information on Fall & Injury Prevention, visit: [email or web address] OR  [email or web address] OR  https://www.cdc.gov/steadi/patient.html

## 2022-03-08 NOTE — PROGRESS NOTE ADULT - SUBJECTIVE AND OBJECTIVE BOX
Electrophysiology Brief Post-Op Note    I have personally seen and examined the patient.  I agree with the history and physical which I have reviewed and noted any changes below.  03-08-22 @ 17:29    PRE-OP DIAGNOSIS:  Cryptogenic CVA    POST-OP DIAGNOSIS: Cryptogenic CVA    PROCEDURE: Loop Implant    Physician: Dr. Sarabia  Assistant: MARIO Champion    ESTIMATED BLOOD LOSS:  2    mL    ANESTHESIA TYPE:  [  ]General Anesthesia  [  ] Sedation  [X  ] Local/Regional    CONDITION  [  ] Critical  [  ] Serious  [  ]Fair  [ X ]Good    SPECIMENS REMOVED (IF APPLICABLE):  none    IMPLANTS (IF APPLICABLE)  Loop Recorder (Medtronic)    FINDINGS  PLAN OF CARE  - F/U 3-4 weeks  - Remove band aid in 1 week  - Do not get site wet for 7 days

## 2022-03-08 NOTE — CHART NOTE - NSCHARTNOTEFT_GEN_A_CORE
EP PROCEDURE NOTE    Date of Procedure: 03-08-22  EP Attending: Dr. Sarabia  Assistant: MARIO Champion  Referring Physician: Dr. Oconnell ([place])  Procedure: Loop Recorder Implant    Indication: 56y Male with history of cryptogenic CVA referred for implantable loop recorder.     Anesthesia: Local    EQUIPMENT IMPLANTED  : Medtronic Linq  Model Number: LNQ11  Serial Number: [id number]    PROCEDURE DESCRIPTION  The patient was brought to the electrophysiology procedure area in a non-sedated state. Informed, written consent was obtained prior to the procedure. The left anterior chest region was prepped and cleaned from the nipple to the upper left clavicular border with serial applications of Chlorhexidine. Patient was then covered with sterile drapes in the usual manner. Blood pressure, oxygenation, and level of comfort were stable throughout.     Following infiltration with local anesthetic, a 1-cm incision was made along the left sternal border at the fourth intercostal space. Using the tunneling device the implantable loop recorder was inserted into the subcutaneous tissue. Direct pressure was applied to the wound to obtain hemostasis. The wound was approximated with Dermabond. Steri-strips and a dry, sterile dressing were placed over the wound. R waves were noted to be 0.32 mV.     The patient tolerated the procedure well.     COMPLICATIONS  No immediate complications    CONCLUSIONS  Successful loop recorder implant    EBL: 2 cc

## 2022-03-11 DIAGNOSIS — E11.40 TYPE 2 DIABETES MELLITUS WITH DIABETIC NEUROPATHY, UNSPECIFIED: ICD-10-CM

## 2022-03-11 DIAGNOSIS — M10.9 GOUT, UNSPECIFIED: ICD-10-CM

## 2022-03-11 DIAGNOSIS — N18.30 CHRONIC KIDNEY DISEASE, STAGE 3 UNSPECIFIED: ICD-10-CM

## 2022-03-11 DIAGNOSIS — E04.1 NONTOXIC SINGLE THYROID NODULE: ICD-10-CM

## 2022-03-11 DIAGNOSIS — D64.9 ANEMIA, UNSPECIFIED: ICD-10-CM

## 2022-03-11 DIAGNOSIS — I63.511 CEREBRAL INFARCTION DUE TO UNSPECIFIED OCCLUSION OR STENOSIS OF RIGHT MIDDLE CEREBRAL ARTERY: ICD-10-CM

## 2022-03-11 DIAGNOSIS — R29.703 NIHSS SCORE 3: ICD-10-CM

## 2022-03-11 DIAGNOSIS — H54.61 UNQUALIFIED VISUAL LOSS, RIGHT EYE, NORMAL VISION LEFT EYE: ICD-10-CM

## 2022-03-11 DIAGNOSIS — E11.22 TYPE 2 DIABETES MELLITUS WITH DIABETIC CHRONIC KIDNEY DISEASE: ICD-10-CM

## 2022-03-11 DIAGNOSIS — I12.9 HYPERTENSIVE CHRONIC KIDNEY DISEASE WITH STAGE 1 THROUGH STAGE 4 CHRONIC KIDNEY DISEASE, OR UNSPECIFIED CHRONIC KIDNEY DISEASE: ICD-10-CM

## 2022-03-11 DIAGNOSIS — E11.65 TYPE 2 DIABETES MELLITUS WITH HYPERGLYCEMIA: ICD-10-CM

## 2022-03-11 DIAGNOSIS — G83.24 MONOPLEGIA OF UPPER LIMB AFFECTING LEFT NONDOMINANT SIDE: ICD-10-CM

## 2022-03-11 DIAGNOSIS — I63.9 CEREBRAL INFARCTION, UNSPECIFIED: ICD-10-CM

## 2022-03-11 DIAGNOSIS — E78.5 HYPERLIPIDEMIA, UNSPECIFIED: ICD-10-CM

## 2022-03-11 DIAGNOSIS — A60.00 HERPESVIRAL INFECTION OF UROGENITAL SYSTEM, UNSPECIFIED: ICD-10-CM

## 2022-03-11 DIAGNOSIS — R29.898 OTHER SYMPTOMS AND SIGNS INVOLVING THE MUSCULOSKELETAL SYSTEM: ICD-10-CM

## 2022-03-11 PROBLEM — Z00.00 ENCOUNTER FOR PREVENTIVE HEALTH EXAMINATION: Status: ACTIVE | Noted: 2022-03-11

## 2022-03-15 ENCOUNTER — APPOINTMENT (OUTPATIENT)
Dept: CARDIOLOGY | Facility: CLINIC | Age: 57
End: 2022-03-15
Payer: COMMERCIAL

## 2022-03-15 VITALS
SYSTOLIC BLOOD PRESSURE: 120 MMHG | WEIGHT: 216 LBS | BODY MASS INDEX: 30.24 KG/M2 | HEART RATE: 80 BPM | HEIGHT: 71 IN | TEMPERATURE: 97.7 F | DIASTOLIC BLOOD PRESSURE: 60 MMHG

## 2022-03-15 PROCEDURE — 93285 PRGRMG DEV EVAL SCRMS IP: CPT

## 2022-03-15 PROCEDURE — 93000 ELECTROCARDIOGRAM COMPLETE: CPT | Mod: 59

## 2022-03-15 PROCEDURE — 99213 OFFICE O/P EST LOW 20 MIN: CPT | Mod: 25

## 2022-03-19 NOTE — PROCEDURE
[No] : not [NSR] : normal sinus rhythm [Sensing Amplitude ___mv] : sensing amplitude was [unfilled] mv [de-identified] : Medtronic [de-identified] : LNQ11 [de-identified] : [id number] [de-identified] : 3/8/22 [de-identified] : No events.

## 2022-03-19 NOTE — HISTORY OF PRESENT ILLNESS
[de-identified] : I had a pleasure of seeing Mr. PIKE for consultation for cryptogenic CVA and device care. He is accompanied by his wife. \par \par Mr. PIKE is a 56 year-year old male with history of HTN, DM, DL, recent CVA s/p ILR is here for f-up.\par \par Feels better.\par \par Denies chest pain, shortness of breath, palpitation, dizziness or LOC except noted above.\par \par EKG (3/15/22): SR@80, QRSd 110, \par ROMINA (03/22): Nl EF, Mod LVH, Mild LAE, intact IAS\par

## 2022-03-19 NOTE — ASSESSMENT
[FreeTextEntry1] : ## ## Cryptogenic CVA s/p ILR\par \par - No etiology identified so far\par - ILR interrogation revealed appropriate function. No events recorded. Adjustments made as needed. \par - 2-weeks f-up for incision check\par - Neuro/cardio f-up

## 2022-03-19 NOTE — PHYSICAL EXAM
[General Appearance - Well Developed] : well developed [Normal Appearance] : normal appearance [Well Groomed] : well groomed [General Appearance - Well Nourished] : well nourished [No Deformities] : no deformities [General Appearance - In No Acute Distress] : no acute distress [Heart Rate And Rhythm] : heart rate and rhythm were normal [Heart Sounds] : normal S1 and S2 [Murmurs] : no murmurs present [Respiration, Rhythm And Depth] : normal respiratory rhythm and effort [Exaggerated Use Of Accessory Muscles For Inspiration] : no accessory muscle use [Auscultation Breath Sounds / Voice Sounds] : lungs were clear to auscultation bilaterally [Clean] : clean [Dry] : dry [Healing Well] : healing well [Nail Clubbing] : no clubbing of the fingernails [Cyanosis, Localized] : no localized cyanosis [Petechial Hemorrhages (___cm)] : no petechial hemorrhages [] : no ischemic changes [FreeTextEntry2] : + clot at the incision site.

## 2022-03-23 ENCOUNTER — APPOINTMENT (OUTPATIENT)
Dept: CARDIOLOGY | Facility: CLINIC | Age: 57
End: 2022-03-23
Payer: COMMERCIAL

## 2022-03-23 VITALS
BODY MASS INDEX: 29.68 KG/M2 | TEMPERATURE: 97.7 F | HEIGHT: 71 IN | DIASTOLIC BLOOD PRESSURE: 78 MMHG | WEIGHT: 212 LBS | SYSTOLIC BLOOD PRESSURE: 125 MMHG

## 2022-03-23 DIAGNOSIS — Z80.0 FAMILY HISTORY OF MALIGNANT NEOPLASM OF DIGESTIVE ORGANS: ICD-10-CM

## 2022-03-23 DIAGNOSIS — E78.00 PURE HYPERCHOLESTEROLEMIA, UNSPECIFIED: ICD-10-CM

## 2022-03-23 DIAGNOSIS — E11.9 TYPE 2 DIABETES MELLITUS W/OUT COMPLICATIONS: ICD-10-CM

## 2022-03-23 DIAGNOSIS — Z87.39 PERSONAL HISTORY OF OTHER DISEASES OF THE MUSCULOSKELETAL SYSTEM AND CONNECTIVE TISSUE: ICD-10-CM

## 2022-03-23 DIAGNOSIS — Z83.3 FAMILY HISTORY OF DIABETES MELLITUS: ICD-10-CM

## 2022-03-23 DIAGNOSIS — T14.8XXD OTHER INJURY OF UNSPECIFIED BODY REGION, SUBSEQUENT ENCOUNTER: ICD-10-CM

## 2022-03-23 PROCEDURE — 93000 ELECTROCARDIOGRAM COMPLETE: CPT

## 2022-03-30 NOTE — PROCEDURE
[No] : not [NSR] : normal sinus rhythm [Sensing Amplitude ___mv] : sensing amplitude was [unfilled] mv [de-identified] : Medtronic [de-identified] : LNQ11 [de-identified] : [id number] [de-identified] : 3/8/22 [de-identified] : Good [de-identified] : No events.

## 2022-03-30 NOTE — PHYSICAL EXAM
[General Appearance - Well Developed] : well developed [Normal Appearance] : normal appearance [General Appearance - Well Nourished] : well nourished [General Appearance - In No Acute Distress] : no acute distress [Heart Rate And Rhythm] : heart rate and rhythm were normal [Heart Sounds] : normal S1 and S2 [] : no respiratory distress [Respiration, Rhythm And Depth] : normal respiratory rhythm and effort [Bleeding] : active bleeding [Indurated] : indurated [Abdomen Soft] : soft [Erythema] : not erythematous [FreeTextEntry1] : left para sternal

## 2022-03-30 NOTE — ASSESSMENT
[FreeTextEntry1] : s/p ILR implant on 3/8 for long term surveillance for occult afib.\par \par ECG ( 3/23/2022) 78 sinus rhythm with 1st degree AVB FL 222ms,  ms incomplete RBB\par Presents today for ILR incision check\par \par Left parasternal incision covered with gauze with semi dry blood on it.\par Inspection of the wound showed un approximated skin with keloid formation.\par surrounding site was cleansed with alcohol prep- palpated with sterile 4x4 to check further bleeding.\par Incision closed with dermabond- waited to dry then sterile dressing ( semi pressure) applied\par Patient denies pain, no redness @ the site, not warm.\par \par PLAN\par Instructed to keep dressing over the weekend.\par \par checked by phone on 3/29 - dressing was taken off  -with semi dry blood.\par No fresh blood noted. dermabond still in place.\par will expose to air now. \par Instructed patient to avoid rubbing or scratching chest area.\par Will recheck Friday; 4/1 by phone.\par \par RTO 3-6 months and prn\par \par

## 2022-03-30 NOTE — HISTORY OF PRESENT ILLNESS
[de-identified] : \par \par Mr. PIKE is a 56 year- old male with history of HTN, DM type 2, DLD, traumatic Right eye blindness. admitted to [facility] thru ED for Left arm weakness. He received TPA. MRI of the head showed small acute infarct in the Right caudate and Eight high precentral gyrus. ROMINA showed no PFO, no Thrombus.   ILR was implanted on 3/8/2022 for cryptogenic stroke long term surveillance.\par \par \par \par Denies chest pain, shortness of breath, palpitation, dizziness or LOC except noted above.\par \par EKG (3/15/22): SR@80, QRSd 110, \par ROMINA (03/22): Nl EF, Mod LVH, Mild LAE, intact IAS\par

## 2022-03-31 ENCOUNTER — NON-APPOINTMENT (OUTPATIENT)
Age: 57
End: 2022-03-31

## 2022-04-01 PROBLEM — T14.8XXD DELAYED WOUND HEALING: Status: ACTIVE | Noted: 2022-04-01

## 2022-04-07 ENCOUNTER — APPOINTMENT (OUTPATIENT)
Dept: CARDIOLOGY | Facility: CLINIC | Age: 57
End: 2022-04-07
Payer: COMMERCIAL

## 2022-04-07 VITALS
DIASTOLIC BLOOD PRESSURE: 73 MMHG | TEMPERATURE: 98.1 F | RESPIRATION RATE: 16 BRPM | HEIGHT: 71 IN | SYSTOLIC BLOOD PRESSURE: 125 MMHG | HEART RATE: 87 BPM | WEIGHT: 220 LBS | BODY MASS INDEX: 30.8 KG/M2

## 2022-04-07 PROCEDURE — 99212 OFFICE O/P EST SF 10 MIN: CPT

## 2022-04-07 NOTE — PHYSICAL EXAM
[General Appearance - Well Developed] : well developed [Normal Appearance] : normal appearance [General Appearance - Well Nourished] : well nourished [General Appearance - In No Acute Distress] : no acute distress [Heart Rate And Rhythm] : heart rate and rhythm were normal [Heart Sounds] : normal S1 and S2 [] : no respiratory distress [Respiration, Rhythm And Depth] : normal respiratory rhythm and effort [Bowel Sounds] : normal bowel sounds [Abdomen Soft] : soft [Clean] : clean [Dry] : dry [Healing Well] : healing well [Bleeding] : no active bleeding [Erythema] : not erythematous [Indurated] : not indurated [FreeTextEntry1] : left para sternal

## 2022-04-07 NOTE — HISTORY OF PRESENT ILLNESS
[de-identified] : \par Mr. PIKE is a 56 year- old male with history of HTN, DM type 2, DLD, traumatic Right eye blindness. admitted to [facility] thru ED for Left arm weakness. He received TPA. MRI of the head showed small acute infarct in the Right caudate and Eight high precentral gyrus. ROMINA showed no PFO, no Thrombus.   ILR was implanted on 3/8/2022 for cryptogenic stroke long term surveillance.\par Developed erythema, soreness around the loop recorder incision site, was given a course of antibiotic/Keflex for 7 days. \par \par He returns for follow up/ wound check reassessment \par \par Denies chest pain, shortness of breath, palpitation, dizziness, presyncope or syncope. Denies recurrent TIA/CVA symptoms.\par \par Cardiac tests:\par ECG (4/7/2022): NSR at 79 bpm, \par EKG (3/15/22): SR@80, QRSd 110, \par ROMINA (03/22): Nl EF, Mod LVH, Mild LAE, intact IAS\par

## 2022-04-07 NOTE — OBJECTIVE
Letter sent to patient with colonoscopy results. Health maintenance updated. Recall placed. Surgical history updated.        [History reviewed] : History reviewed. [Medications and Allergies reviewed] : Medications and allergies reviewed.

## 2022-04-07 NOTE — PROCEDURE
[No] : not [NSR] : normal sinus rhythm [Sensing Amplitude ___mv] : sensing amplitude was [unfilled] mv [None] : none [Programmed for Longevity] : output reprogrammed for improved battery longevity [See Scanned Paceart Report] : See scanned paceart report [See Device Printout] : See device printout [de-identified] : 83 BPM [de-identified] : ELIZA [de-identified] : TIANAQ LNQ11 [de-identified] : [id number] [de-identified] : 3/8/2022 [de-identified] : No events\par MONITOR IS DISCONNECTED

## 2022-04-07 NOTE — ASSESSMENT
[FreeTextEntry1] : s/p ILR implant on 3/8 for long term surveillance for occult afib.\par \par ECG ( 3/23/2022) 78 sinus rhythm with 1st degree AVB MT 222ms,  ms incomplete RBB\par Presents today for ILR incision check\par \par -Left parasternal incision cleansed with alcohol prep, remaining of dry dermabond removed, incision is healing well, edges closed/ epithelialization noted. Bacitracin applied and covered with Band-Aid.\par -Loop reorder interrogated; No events \par -He is enrolled in remote monitoring services, however home transmitter has been disconnected. \par -Instructed to reconnect transmitter and send manual download to re-establish remote  connection. \par -Continue current medication regimen \par -Instructed to apply Bacitracin to incision site and cover with Band-Aid for 3-4 days . \par -Monitor site for any changes\par -Patient knows to call us for any questions/ concerns \par -Recommended to return for follow up in 2 weeks. Wife said, they will call for appointment. \par \par \par \par .\par \par \par \par \par \par

## 2022-06-01 ENCOUNTER — NON-APPOINTMENT (OUTPATIENT)
Age: 57
End: 2022-06-01

## 2022-06-01 ENCOUNTER — APPOINTMENT (OUTPATIENT)
Dept: CARDIOLOGY | Facility: CLINIC | Age: 57
End: 2022-06-01
Payer: COMMERCIAL

## 2022-06-01 PROCEDURE — 93298 REM INTERROG DEV EVAL SCRMS: CPT

## 2022-06-01 PROCEDURE — G2066: CPT

## 2022-06-22 ENCOUNTER — APPOINTMENT (OUTPATIENT)
Dept: CARDIOLOGY | Facility: CLINIC | Age: 57
End: 2022-06-22

## 2022-07-11 ENCOUNTER — APPOINTMENT (OUTPATIENT)
Dept: NEUROLOGY | Facility: CLINIC | Age: 57
End: 2022-07-11

## 2022-07-25 ENCOUNTER — NON-APPOINTMENT (OUTPATIENT)
Age: 57
End: 2022-07-25

## 2022-07-25 ENCOUNTER — APPOINTMENT (OUTPATIENT)
Dept: CARDIOLOGY | Facility: CLINIC | Age: 57
End: 2022-07-25

## 2022-07-25 PROCEDURE — G2066: CPT

## 2022-07-25 PROCEDURE — 93298 REM INTERROG DEV EVAL SCRMS: CPT

## 2022-07-29 ENCOUNTER — APPOINTMENT (OUTPATIENT)
Dept: CARDIOLOGY | Facility: CLINIC | Age: 57
End: 2022-07-29

## 2022-07-29 VITALS
WEIGHT: 228 LBS | HEIGHT: 71 IN | HEART RATE: 83 BPM | SYSTOLIC BLOOD PRESSURE: 120 MMHG | BODY MASS INDEX: 31.92 KG/M2 | DIASTOLIC BLOOD PRESSURE: 84 MMHG | TEMPERATURE: 97.8 F

## 2022-07-29 PROCEDURE — 93000 ELECTROCARDIOGRAM COMPLETE: CPT | Mod: 59

## 2022-07-29 PROCEDURE — 93291 INTERROG DEV EVAL SCRMS IP: CPT

## 2022-07-29 RX ORDER — TAMSULOSIN HYDROCHLORIDE 0.4 MG/1
0.4 CAPSULE ORAL DAILY
Refills: 0 | Status: COMPLETED | COMMUNITY
End: 2022-07-29

## 2022-07-29 RX ORDER — ASPIRIN ENTERIC COATED TABLETS 81 MG 81 MG/1
81 TABLET, DELAYED RELEASE ORAL DAILY
Refills: 0 | Status: COMPLETED | COMMUNITY
End: 2022-07-29

## 2022-07-29 RX ORDER — CEPHALEXIN 500 MG/1
500 TABLET ORAL
Qty: 14 | Refills: 0 | Status: COMPLETED | COMMUNITY
Start: 2022-04-01 | End: 2022-07-29

## 2022-08-03 RX ORDER — CLOPIDOGREL BISULFATE 75 MG/1
75 TABLET, FILM COATED ORAL DAILY
Refills: 0 | Status: DISCONTINUED | COMMUNITY
End: 2022-08-03

## 2022-08-03 NOTE — PHYSICAL EXAM
[General Appearance - Well Developed] : well developed [Normal Appearance] : normal appearance [General Appearance - Well Nourished] : well nourished [General Appearance - In No Acute Distress] : no acute distress [Heart Rate And Rhythm] : heart rate and rhythm were normal [Heart Sounds] : normal S1 and S2 [] : no respiratory distress [Respiration, Rhythm And Depth] : normal respiratory rhythm and effort [Clean] : clean [Dry] : dry [Bowel Sounds] : normal bowel sounds [Abdomen Soft] : soft [Well-Healed] : well-healed [Healing Well] : healing poorly [Bleeding] : no active bleeding [Erythema] : not erythematous [Indurated] : not indurated [FreeTextEntry1] : left para sternal

## 2022-08-03 NOTE — ASSESSMENT
[FreeTextEntry1] : s/p ILR implant on 3/8 for long term surveillance for occult afib.\par \par NSVT at 188bpm for <5 secs -  patient denies any palpitations.\par He is not sure if he had recent cardiac w/up\par His cardiologist is Dr. Michelle Oconnell  from [place]. \par I made 2 attempt to speak to her. I left my personal phone number to the .\par I would like to discuss possible ischemia w/up.\par \par Patient didn't do any follow up with neuro as well and is not on plavix anymore.\par \par On remote monitoring\par \par RTO in 6 months \par \par Addendum:\par I called patient to make an appointment with his cardiologist.\par \par \par \par \par \par \par .\par \par \par \par \par \par

## 2022-08-03 NOTE — PROCEDURE
[No] : not [NSR] : normal sinus rhythm [See Scanned Paceart Report] : See scanned paceart report [See Device Printout] : See device printout [Sensing Amplitude ___mv] : sensing amplitude was [unfilled] mv [None] : none [Programmed for Longevity] : output reprogrammed for improved battery longevity [de-identified] : 83 BPM [de-identified] : ELIZA [de-identified] : TIANAQ LNQ11 [de-identified] : [id number] [de-identified] : 3/8/2022 [de-identified] : 3 tachy episodes\par 1. SR with WCT consistent with NSVT  at 188bpm \par 2 ST/AT at 154bpm

## 2022-08-03 NOTE — HISTORY OF PRESENT ILLNESS
[de-identified] : \par Mr. PIKE is a 56 year- old male with history of HTN, DM type 2, DLD, traumatic Right eye blindness. admitted to [facility] thru ED for Left arm weakness. He received TPA. MRI of the head showed small acute infarct in the Right caudate and Eight high precentral gyrus. ROMINA showed no PFO, no Thrombus.   ILR was implanted on 3/8/2022 for cryptogenic stroke long term surveillance.\par Developed erythema, soreness around the loop recorder incision site, was given a course of antibiotic/Keflex for 7 days. Incision site has healed nicely.\par \par \par He returns for follow up for routine device interrogation\par \par Denies chest pain, shortness of breath, palpitation, dizziness, presyncope or syncope. Denies recurrent TIA/CVA symptoms.\par \par Cardiac tests:\par ECG ( 7/29/2022) 83bpm sinus rhythm   ms, QRS 106ms\par ECG (4/7/2022): NSR at 79 bpm, \par EKG (3/15/22): SR@80, QRSd 110, \par ROMINA (03/22): Nl EF, Mod LVH, Mild LAE, intact IAS\par

## 2022-08-30 ENCOUNTER — APPOINTMENT (OUTPATIENT)
Dept: CARDIOLOGY | Facility: CLINIC | Age: 57
End: 2022-08-30

## 2022-08-30 ENCOUNTER — NON-APPOINTMENT (OUTPATIENT)
Age: 57
End: 2022-08-30

## 2022-08-30 PROCEDURE — G2066: CPT

## 2022-08-30 PROCEDURE — 93298 REM INTERROG DEV EVAL SCRMS: CPT

## 2022-08-31 ENCOUNTER — NON-APPOINTMENT (OUTPATIENT)
Age: 57
End: 2022-08-31

## 2022-10-04 ENCOUNTER — NON-APPOINTMENT (OUTPATIENT)
Age: 57
End: 2022-10-04

## 2022-10-04 ENCOUNTER — APPOINTMENT (OUTPATIENT)
Dept: CARDIOLOGY | Facility: CLINIC | Age: 57
End: 2022-10-04

## 2022-10-04 PROCEDURE — G2066: CPT

## 2022-10-04 PROCEDURE — 93298 REM INTERROG DEV EVAL SCRMS: CPT

## 2022-11-07 ENCOUNTER — NON-APPOINTMENT (OUTPATIENT)
Age: 57
End: 2022-11-07

## 2022-11-07 ENCOUNTER — APPOINTMENT (OUTPATIENT)
Dept: CARDIOLOGY | Facility: CLINIC | Age: 57
End: 2022-11-07

## 2022-11-07 PROCEDURE — G2066: CPT

## 2022-11-07 PROCEDURE — 93298 REM INTERROG DEV EVAL SCRMS: CPT

## 2022-11-11 ENCOUNTER — NON-APPOINTMENT (OUTPATIENT)
Age: 57
End: 2022-11-11

## 2022-12-09 ENCOUNTER — APPOINTMENT (OUTPATIENT)
Dept: CARDIOLOGY | Facility: CLINIC | Age: 57
End: 2022-12-09

## 2022-12-12 ENCOUNTER — FORM ENCOUNTER (OUTPATIENT)
Age: 57
End: 2022-12-12

## 2022-12-19 ENCOUNTER — APPOINTMENT (OUTPATIENT)
Dept: CARDIOLOGY | Facility: CLINIC | Age: 57
End: 2022-12-19

## 2022-12-19 ENCOUNTER — NON-APPOINTMENT (OUTPATIENT)
Age: 57
End: 2022-12-19

## 2022-12-19 PROCEDURE — G2066: CPT

## 2022-12-19 PROCEDURE — 93298 REM INTERROG DEV EVAL SCRMS: CPT

## 2023-01-06 ENCOUNTER — APPOINTMENT (OUTPATIENT)
Dept: ELECTROPHYSIOLOGY | Facility: CLINIC | Age: 58
End: 2023-01-06
Payer: COMMERCIAL

## 2023-01-06 VITALS
WEIGHT: 220 LBS | HEIGHT: 71 IN | HEART RATE: 91 BPM | BODY MASS INDEX: 30.8 KG/M2 | SYSTOLIC BLOOD PRESSURE: 143 MMHG | TEMPERATURE: 98 F | DIASTOLIC BLOOD PRESSURE: 84 MMHG | RESPIRATION RATE: 16 BRPM

## 2023-01-06 DIAGNOSIS — Z78.9 OTHER SPECIFIED HEALTH STATUS: ICD-10-CM

## 2023-01-06 PROCEDURE — 93291 INTERROG DEV EVAL SCRMS IP: CPT

## 2023-01-06 PROCEDURE — 93000 ELECTROCARDIOGRAM COMPLETE: CPT | Mod: 59

## 2023-01-13 PROBLEM — Z78.9 CONSUMES ALCOHOL OCCASIONALLY: Status: ACTIVE | Noted: 2023-01-13

## 2023-01-13 NOTE — ASSESSMENT
[FreeTextEntry1] : s/p ILR implant on 3/8 for long term surveillance for occult afib. Patient having NSVT. longest 8 secs.\par \par Spoke to his cardiologist on the phone before - had an echo done  on 11/19/2022 which is normal.\par No stress test done.\par \par Device interrogation today \par #new NSVT episodes  ( 7 ) rate ranging 154 to 200ms.\par  patient  report had some chest discomfort\par  Spoke with Dr. Michelle Oconnell  via cell phone- faxed copies of the events.\par   No AFIB seen\par PLAN: \par 1. Cardiologist will do stress schemic w/up.\par \par # HTN slightly elevated on lisinopril and amlodipine\par Advise heart healthy diet \par Avoid salt and processed food.\par \par \par On remote monitoring- 17 days disconnected. RN Called patient on the phone to check connection and  do a manual download\par \par RTO in 6 months \par \par \par \par \par \par \par \par .\par \par \par \par \par \par

## 2023-01-13 NOTE — PHYSICAL EXAM
[General Appearance - Well Developed] : well developed [Normal Appearance] : normal appearance [General Appearance - Well Nourished] : well nourished [General Appearance - In No Acute Distress] : no acute distress [Heart Rate And Rhythm] : heart rate and rhythm were normal [Heart Sounds] : normal S1 and S2 [] : no respiratory distress [Respiration, Rhythm And Depth] : normal respiratory rhythm and effort [Clean] : clean [Dry] : dry [Well-Healed] : well-healed [Bowel Sounds] : normal bowel sounds [Abdomen Soft] : soft [Healing Well] : healing poorly [Bleeding] : no active bleeding [Erythema] : not erythematous [Indurated] : not indurated [FreeTextEntry1] : left para sternal

## 2023-01-13 NOTE — HISTORY OF PRESENT ILLNESS
[de-identified] : \par Mr. PIKE is a 57 year- old male with history of HTN, DM type 2, DLD, traumatic Right eye blindness. admitted to [facility] thru ED for Left arm weakness. He received TPA. MRI of the head showed small acute infarct in the Right caudate and Eight high precentral gyrus. ROMINA showed no PFO, no Thrombus.   ILR was implanted on 3/8/2022 for cryptogenic stroke long term surveillance.\par Developed erythema, soreness around the loop recorder incision site, was given a course of antibiotic/Keflex for 7 days. Incision site has healed nicely.\par \par \par He returns for follow up for routine device interrogation\par \par Denies chest pain, shortness of breath, palpitation, dizziness, presyncope or syncope. Denies recurrent TIA/CVA symptoms.\par \par Cardiologist : Dr. Dr. Michelle Oconnell  ( [place])  \par \par \par

## 2023-01-13 NOTE — PROCEDURE
[No] : not [NSR] : normal sinus rhythm [See Scanned Paceart Report] : See scanned paceart report [See Device Printout] : See device printout [Sensing Amplitude ___mv] : sensing amplitude was [unfilled] mv [None] : none [Programmed for Longevity] : output reprogrammed for improved battery longevity [de-identified] : 83 BPM [de-identified] : ELIZA [de-identified] : TIANAQ LNQ11 [de-identified] : [id number] [de-identified] : 3/8/2022 [de-identified] : 3 tachy episodes\par 1. SR with WCT consistent with NSVT  at 188bpm \par 2 ST/AT at 154bpm

## 2023-01-13 NOTE — CARDIOLOGY SUMMARY
[de-identified] : \par ECG ( 1/6/20230 87bpm sinus rhythm with 1st degree\par ECG ( 7/29/2022) 83bpm sinus rhythm   ms, QRS 106ms\par ECG (4/7/2022): NSR at 79 bpm, \par EKG (3/15/22): SR@80, QRSd 110, \par  [de-identified] : TTE ( 11/19/2022) LVEF 65-70%\par ROMINA (03/22): Nl EF, Mod LVH, Mild LAE, intact IAS [de-identified] : 3/8/2022 - MDT ILR implant

## 2023-01-20 ENCOUNTER — INPATIENT (INPATIENT)
Facility: HOSPITAL | Age: 58
LOS: 1 days | Discharge: HOME | End: 2023-01-22
Attending: INTERNAL MEDICINE | Admitting: INTERNAL MEDICINE
Payer: COMMERCIAL

## 2023-01-20 VITALS
WEIGHT: 220.02 LBS | HEART RATE: 94 BPM | TEMPERATURE: 100 F | DIASTOLIC BLOOD PRESSURE: 91 MMHG | SYSTOLIC BLOOD PRESSURE: 187 MMHG | RESPIRATION RATE: 20 BRPM | OXYGEN SATURATION: 98 %

## 2023-01-20 DIAGNOSIS — Z98.890 OTHER SPECIFIED POSTPROCEDURAL STATES: Chronic | ICD-10-CM

## 2023-01-20 LAB
ALBUMIN SERPL ELPH-MCNC: 4.5 G/DL — SIGNIFICANT CHANGE UP (ref 3.5–5.2)
ALP SERPL-CCNC: 77 U/L — SIGNIFICANT CHANGE UP (ref 30–115)
ALT FLD-CCNC: 21 U/L — SIGNIFICANT CHANGE UP (ref 0–41)
ANION GAP SERPL CALC-SCNC: 14 MMOL/L — SIGNIFICANT CHANGE UP (ref 7–14)
ANISOCYTOSIS BLD QL: SIGNIFICANT CHANGE UP
AST SERPL-CCNC: 29 U/L — SIGNIFICANT CHANGE UP (ref 0–41)
BASOPHILS # BLD AUTO: 0.04 K/UL — SIGNIFICANT CHANGE UP (ref 0–0.2)
BASOPHILS NFR BLD AUTO: 0.6 % — SIGNIFICANT CHANGE UP (ref 0–1)
BILIRUB SERPL-MCNC: 0.5 MG/DL — SIGNIFICANT CHANGE UP (ref 0.2–1.2)
BUN SERPL-MCNC: 18 MG/DL — SIGNIFICANT CHANGE UP (ref 10–20)
CALCIUM SERPL-MCNC: 9.4 MG/DL — SIGNIFICANT CHANGE UP (ref 8.4–10.5)
CHLORIDE SERPL-SCNC: 101 MMOL/L — SIGNIFICANT CHANGE UP (ref 98–110)
CK MB CFR SERPL CALC: 4.8 NG/ML — SIGNIFICANT CHANGE UP (ref 0.6–6.3)
CK SERPL-CCNC: 595 U/L — HIGH (ref 0–225)
CO2 SERPL-SCNC: 23 MMOL/L — SIGNIFICANT CHANGE UP (ref 17–32)
CREAT SERPL-MCNC: 1.3 MG/DL — SIGNIFICANT CHANGE UP (ref 0.7–1.5)
EGFR: 64 ML/MIN/1.73M2 — SIGNIFICANT CHANGE UP
EOSINOPHIL # BLD AUTO: 0.02 K/UL — SIGNIFICANT CHANGE UP (ref 0–0.7)
EOSINOPHIL NFR BLD AUTO: 0.3 % — SIGNIFICANT CHANGE UP (ref 0–8)
FLUAV AG NPH QL: SIGNIFICANT CHANGE UP
FLUBV AG NPH QL: SIGNIFICANT CHANGE UP
GLUCOSE BLDC GLUCOMTR-MCNC: 154 MG/DL — HIGH (ref 70–99)
GLUCOSE BLDC GLUCOMTR-MCNC: 174 MG/DL — HIGH (ref 70–99)
GLUCOSE SERPL-MCNC: 215 MG/DL — HIGH (ref 70–99)
HCT VFR BLD CALC: 48.3 % — SIGNIFICANT CHANGE UP (ref 42–52)
HGB BLD-MCNC: 14.9 G/DL — SIGNIFICANT CHANGE UP (ref 14–18)
HYPOCHROMIA BLD QL: SLIGHT — SIGNIFICANT CHANGE UP
IMM GRANULOCYTES NFR BLD AUTO: 0.9 % — HIGH (ref 0.1–0.3)
LYMPHOCYTES # BLD AUTO: 0.92 K/UL — LOW (ref 1.2–3.4)
LYMPHOCYTES # BLD AUTO: 13.7 % — LOW (ref 20.5–51.1)
MANUAL SMEAR VERIFICATION: SIGNIFICANT CHANGE UP
MCHC RBC-ENTMCNC: 20.4 PG — LOW (ref 27–31)
MCHC RBC-ENTMCNC: 30.8 G/DL — LOW (ref 32–37)
MCV RBC AUTO: 66.2 FL — LOW (ref 80–94)
MICROCYTES BLD QL: SIGNIFICANT CHANGE UP
MONOCYTES # BLD AUTO: 0.87 K/UL — HIGH (ref 0.1–0.6)
MONOCYTES NFR BLD AUTO: 13 % — HIGH (ref 1.7–9.3)
NEUTROPHILS # BLD AUTO: 4.79 K/UL — SIGNIFICANT CHANGE UP (ref 1.4–6.5)
NEUTROPHILS NFR BLD AUTO: 71.5 % — SIGNIFICANT CHANGE UP (ref 42.2–75.2)
NRBC # BLD: 0 /100 WBCS — SIGNIFICANT CHANGE UP (ref 0–0)
NT-PROBNP SERPL-SCNC: 243 PG/ML — SIGNIFICANT CHANGE UP (ref 0–300)
OVALOCYTES BLD QL SMEAR: SLIGHT — SIGNIFICANT CHANGE UP
PLAT MORPH BLD: NORMAL — SIGNIFICANT CHANGE UP
PLATELET # BLD AUTO: 252 K/UL — SIGNIFICANT CHANGE UP (ref 130–400)
POTASSIUM SERPL-MCNC: 4.3 MMOL/L — SIGNIFICANT CHANGE UP (ref 3.5–5)
POTASSIUM SERPL-SCNC: 4.3 MMOL/L — SIGNIFICANT CHANGE UP (ref 3.5–5)
PROT SERPL-MCNC: 7.5 G/DL — SIGNIFICANT CHANGE UP (ref 6–8)
RBC # BLD: 7.3 M/UL — HIGH (ref 4.7–6.1)
RBC # FLD: 18.4 % — HIGH (ref 11.5–14.5)
RBC BLD AUTO: ABNORMAL
RSV RNA NPH QL NAA+NON-PROBE: SIGNIFICANT CHANGE UP
SARS-COV-2 RNA SPEC QL NAA+PROBE: DETECTED
SODIUM SERPL-SCNC: 138 MMOL/L — SIGNIFICANT CHANGE UP (ref 135–146)
TARGETS BLD QL SMEAR: SLIGHT — SIGNIFICANT CHANGE UP
TROPONIN T SERPL-MCNC: 0.06 NG/ML — CRITICAL HIGH
TROPONIN T SERPL-MCNC: 0.06 NG/ML — CRITICAL HIGH
WBC # BLD: 6.7 K/UL — SIGNIFICANT CHANGE UP (ref 4.8–10.8)
WBC # FLD AUTO: 6.7 K/UL — SIGNIFICANT CHANGE UP (ref 4.8–10.8)

## 2023-01-20 PROCEDURE — 93010 ELECTROCARDIOGRAM REPORT: CPT

## 2023-01-20 PROCEDURE — 99285 EMERGENCY DEPT VISIT HI MDM: CPT

## 2023-01-20 PROCEDURE — 71046 X-RAY EXAM CHEST 2 VIEWS: CPT | Mod: 26

## 2023-01-20 PROCEDURE — 99222 1ST HOSP IP/OBS MODERATE 55: CPT

## 2023-01-20 RX ORDER — SODIUM CHLORIDE 9 MG/ML
1000 INJECTION, SOLUTION INTRAVENOUS
Refills: 0 | Status: DISCONTINUED | OUTPATIENT
Start: 2023-01-20 | End: 2023-01-22

## 2023-01-20 RX ORDER — REPAGLINIDE 1 MG/1
2 TABLET ORAL
Refills: 0 | Status: DISCONTINUED | OUTPATIENT
Start: 2023-01-20 | End: 2023-01-22

## 2023-01-20 RX ORDER — ACYCLOVIR SODIUM 500 MG
1 VIAL (EA) INTRAVENOUS
Qty: 0 | Refills: 0 | DISCHARGE

## 2023-01-20 RX ORDER — ATORVASTATIN CALCIUM 80 MG/1
1 TABLET, FILM COATED ORAL
Qty: 0 | Refills: 0 | DISCHARGE

## 2023-01-20 RX ORDER — GLUCAGON INJECTION, SOLUTION 0.5 MG/.1ML
1 INJECTION, SOLUTION SUBCUTANEOUS ONCE
Refills: 0 | Status: DISCONTINUED | OUTPATIENT
Start: 2023-01-20 | End: 2023-01-22

## 2023-01-20 RX ORDER — ACETAMINOPHEN 500 MG
650 TABLET ORAL EVERY 6 HOURS
Refills: 0 | Status: DISCONTINUED | OUTPATIENT
Start: 2023-01-20 | End: 2023-01-22

## 2023-01-20 RX ORDER — ACETAMINOPHEN 500 MG
975 TABLET ORAL ONCE
Refills: 0 | Status: COMPLETED | OUTPATIENT
Start: 2023-01-20 | End: 2023-01-20

## 2023-01-20 RX ORDER — PIOGLITAZONE HYDROCHLORIDE 15 MG/1
1 TABLET ORAL
Qty: 0 | Refills: 0 | DISCHARGE

## 2023-01-20 RX ORDER — ONDANSETRON 8 MG/1
4 TABLET, FILM COATED ORAL EVERY 8 HOURS
Refills: 0 | Status: DISCONTINUED | OUTPATIENT
Start: 2023-01-20 | End: 2023-01-22

## 2023-01-20 RX ORDER — ALLOPURINOL 300 MG
300 TABLET ORAL DAILY
Refills: 0 | Status: DISCONTINUED | OUTPATIENT
Start: 2023-01-20 | End: 2023-01-22

## 2023-01-20 RX ORDER — SODIUM CHLORIDE 0.65 %
1 AEROSOL, SPRAY (ML) NASAL EVERY 8 HOURS
Refills: 0 | Status: DISCONTINUED | OUTPATIENT
Start: 2023-01-20 | End: 2023-01-22

## 2023-01-20 RX ORDER — ASPIRIN/CALCIUM CARB/MAGNESIUM 324 MG
81 TABLET ORAL DAILY
Refills: 0 | Status: DISCONTINUED | OUTPATIENT
Start: 2023-01-20 | End: 2023-01-22

## 2023-01-20 RX ORDER — ATORVASTATIN CALCIUM 80 MG/1
80 TABLET, FILM COATED ORAL AT BEDTIME
Refills: 0 | Status: DISCONTINUED | OUTPATIENT
Start: 2023-01-20 | End: 2023-01-22

## 2023-01-20 RX ORDER — SODIUM CHLORIDE 9 MG/ML
1000 INJECTION, SOLUTION INTRAVENOUS ONCE
Refills: 0 | Status: COMPLETED | OUTPATIENT
Start: 2023-01-20 | End: 2023-01-20

## 2023-01-20 RX ORDER — HYDRALAZINE HCL 50 MG
10 TABLET ORAL ONCE
Refills: 0 | Status: COMPLETED | OUTPATIENT
Start: 2023-01-20 | End: 2023-01-20

## 2023-01-20 RX ORDER — ALBUTEROL 90 UG/1
2 AEROSOL, METERED ORAL EVERY 6 HOURS
Refills: 0 | Status: DISCONTINUED | OUTPATIENT
Start: 2023-01-20 | End: 2023-01-22

## 2023-01-20 RX ORDER — EMPAGLIFLOZIN, METFORMIN HYDROCHLORIDE 10; 1000 MG/1; MG/1
1 TABLET, EXTENDED RELEASE ORAL
Qty: 0 | Refills: 0 | DISCHARGE

## 2023-01-20 RX ORDER — ENOXAPARIN SODIUM 100 MG/ML
40 INJECTION SUBCUTANEOUS EVERY 24 HOURS
Refills: 0 | Status: DISCONTINUED | OUTPATIENT
Start: 2023-01-20 | End: 2023-01-22

## 2023-01-20 RX ORDER — METFORMIN HYDROCHLORIDE 850 MG/1
1000 TABLET ORAL
Refills: 0 | Status: DISCONTINUED | OUTPATIENT
Start: 2023-01-20 | End: 2023-01-20

## 2023-01-20 RX ORDER — DEXTROSE 50 % IN WATER 50 %
15 SYRINGE (ML) INTRAVENOUS ONCE
Refills: 0 | Status: DISCONTINUED | OUTPATIENT
Start: 2023-01-20 | End: 2023-01-22

## 2023-01-20 RX ORDER — DEXTROSE 50 % IN WATER 50 %
25 SYRINGE (ML) INTRAVENOUS ONCE
Refills: 0 | Status: DISCONTINUED | OUTPATIENT
Start: 2023-01-20 | End: 2023-01-22

## 2023-01-20 RX ORDER — INSULIN LISPRO 100/ML
VIAL (ML) SUBCUTANEOUS
Refills: 0 | Status: DISCONTINUED | OUTPATIENT
Start: 2023-01-20 | End: 2023-01-22

## 2023-01-20 RX ORDER — TAMSULOSIN HYDROCHLORIDE 0.4 MG/1
1 CAPSULE ORAL
Qty: 0 | Refills: 0 | DISCHARGE

## 2023-01-20 RX ORDER — LISINOPRIL 2.5 MG/1
40 TABLET ORAL DAILY
Refills: 0 | Status: DISCONTINUED | OUTPATIENT
Start: 2023-01-20 | End: 2023-01-22

## 2023-01-20 RX ORDER — HYDROCHLOROTHIAZIDE 25 MG
1 TABLET ORAL
Qty: 0 | Refills: 0 | DISCHARGE

## 2023-01-20 RX ORDER — AMLODIPINE BESYLATE 2.5 MG/1
10 TABLET ORAL DAILY
Refills: 0 | Status: DISCONTINUED | OUTPATIENT
Start: 2023-01-20 | End: 2023-01-22

## 2023-01-20 RX ORDER — LANOLIN ALCOHOL/MO/W.PET/CERES
3 CREAM (GRAM) TOPICAL AT BEDTIME
Refills: 0 | Status: DISCONTINUED | OUTPATIENT
Start: 2023-01-20 | End: 2023-01-22

## 2023-01-20 RX ADMIN — Medication 10 MILLIGRAM(S): at 22:38

## 2023-01-20 RX ADMIN — Medication 650 MILLIGRAM(S): at 22:49

## 2023-01-20 RX ADMIN — ATORVASTATIN CALCIUM 80 MILLIGRAM(S): 80 TABLET, FILM COATED ORAL at 21:19

## 2023-01-20 RX ADMIN — REPAGLINIDE 2 MILLIGRAM(S): 1 TABLET ORAL at 17:07

## 2023-01-20 RX ADMIN — AMLODIPINE BESYLATE 10 MILLIGRAM(S): 2.5 TABLET ORAL at 14:32

## 2023-01-20 RX ADMIN — Medication 975 MILLIGRAM(S): at 13:44

## 2023-01-20 RX ADMIN — Medication 650 MILLIGRAM(S): at 21:19

## 2023-01-20 RX ADMIN — SODIUM CHLORIDE 1000 MILLILITER(S): 9 INJECTION, SOLUTION INTRAVENOUS at 10:52

## 2023-01-20 RX ADMIN — Medication 1: at 16:47

## 2023-01-20 RX ADMIN — Medication 81 MILLIGRAM(S): at 18:30

## 2023-01-20 RX ADMIN — LISINOPRIL 40 MILLIGRAM(S): 2.5 TABLET ORAL at 15:06

## 2023-01-20 RX ADMIN — ENOXAPARIN SODIUM 40 MILLIGRAM(S): 100 INJECTION SUBCUTANEOUS at 17:06

## 2023-01-20 RX ADMIN — Medication 300 MILLIGRAM(S): at 14:33

## 2023-01-20 RX ADMIN — SODIUM CHLORIDE 75 MILLILITER(S): 9 INJECTION, SOLUTION INTRAVENOUS at 15:29

## 2023-01-20 RX ADMIN — Medication 975 MILLIGRAM(S): at 11:12

## 2023-01-20 NOTE — ED PROVIDER NOTE - PROGRESS NOTE DETAILS
Discussed results with patient and wife.  Patient remains very weak and overall unwell.  Attempted to ambulate the patient but he feels too fatigued.  Given that this is only day 2 of illness, and patient's multiple comorbidities, made a decision for admission, especially in the setting of elevated troponin.    Endorsed to Jose Gilbert

## 2023-01-20 NOTE — PATIENT PROFILE ADULT - FALL HARM RISK - HARM RISK INTERVENTIONS

## 2023-01-20 NOTE — H&P ADULT - HISTORY OF PRESENT ILLNESS
Patient is a 57-year-old male with a history of hypertension, dyslipidemia, diabetes, traumatic right eye blindness, Gout, HSV, CVA in March 2022 with minor weakness to LEFT hand. Patient presents for generalized weakness since today. Yesterday patient developed nonproductive cough, malaise, and this morning had subjective fever, however so went to work.  At work he felt unsteady stumbled and did not fall.  No dyspnea on exertion, chest pain, palpitations.  No focal weakness. No other complaints - no rhinorrhea, sore throat, abd pain, NVD, dysuria, hematuria,, rash, melena, hematochezia.

## 2023-01-20 NOTE — ED PROVIDER NOTE - CARE PLAN
Principal Discharge DX:	2019 novel coronavirus disease (COVID-19)   1 Principal Discharge DX:	2019 novel coronavirus disease (COVID-19)  Secondary Diagnosis:	Elevated troponin  Secondary Diagnosis:	Generalized weakness

## 2023-01-20 NOTE — H&P ADULT - NSHPLABSRESULTS_GEN_ALL_CORE
14.9   6.70  )-----------( 252      ( 20 Jan 2023 10:40 )             48.3       01-20    138  |  101  |  18  ----------------------------<  215<H>  4.3   |  23  |  1.3    Ca    9.4      20 Jan 2023 10:40    TPro  7.5  /  Alb  4.5  /  TBili  0.5  /  DBili  x   /  AST  29  /  ALT  21  /  AlkPhos  77  01-20          Lactate Trend      CARDIAC MARKERS ( 20 Jan 2023 10:40 )  x     / 0.06 ng/mL / x     / x     / x            CAPILLARY BLOOD GLUCOSE      POCT Blood Glucose.: 193 mg/dL (20 Jan 2023 10:43)    < from: Xray Chest 2 Views PA/Lat (01.20.23 @ 11:13) >    Impression:    No radiographic evidence of acute cardiopulmonary disease.        --- End of Report ---            ISAI GOLDEN MD; Attending Radiologist  This document has been electronically signed. Jan 20 2023  1:13PM    < end of copied text >

## 2023-01-20 NOTE — ED PROVIDER NOTE - CLINICAL SUMMARY MEDICAL DECISION MAKING FREE TEXT BOX
Patient with multiple comorbid's with generalized weakness secondary to COVID, denies chest pain, EKG unremarkable, troponin is elevated above from baseline, will admit for supportive care to monitored setting.

## 2023-01-20 NOTE — H&P ADULT - NSHPPHYSICALEXAM_GEN_ALL_CORE
GENERAL:  58y/o Male NAD, resting comfortably.  HEAD:  Atraumatic, Normocephalic  EYES: EOMI, PERRLA, conjunctiva and sclera clear  NECK: Supple, No JVD, no cervical lymphadenopathy, non-tender  CHEST/LUNG: Clear to auscultation bilaterally; No wheeze, rhonchi, or rales  HEART: Regular rate and rhythm; S1&S2  ABDOMEN: Soft, Nontender, Nondistended x 4 quadrants; Bowel sounds present  EXTREMITIES:   Peripheral Pulses Present, No clubbing, no cyanosis, or no edema, no calf tenderness  PSYCH: AAOx3, cooperative, appropriate  NEUROLOGY: WNL  SKIN: WNL

## 2023-01-20 NOTE — ED PROVIDER NOTE - OBJECTIVE STATEMENT
Patient is a 57-year-old male with a history of hypertension, dyslipidemia, diabetes, traumatic right eye blindness, gout, HSV, CVA in March 2022 without any residual deficits. Patient presents for generalized weakness since today.  Yesterday patient developed nonproductive cough, malaise, and this morning had subjective fever, however so went to work.  At work he felt unsteady stumbled and did not fall.  No dyspnea on exertion, chest pain, palpitations.  No focal weakness.  No other complaints - no rhinorrhea, sore throat, abd pain, NVD, dysuria, hematuria, new joint pain, FND, rash, trauma, melena, hematochezia.

## 2023-01-20 NOTE — ED PROVIDER NOTE - PHYSICAL EXAMINATION
_  Vital signs reviewed; ABCs intact  GENERAL: Well nourished, comfortable  SKIN: +Warm to the touch; dry  HEAD & NECK: NCAT, supple neck  EYES: +R eye blindness; L EOMI with round and reactive pupil  ENT: MMM; uvula midline; no oropharyngeal erythema or exudates  CARD: RRR, S1, S2; no murmurs, no rubs, no gallops  RESP: Normal respiratory effort, CTAB, no rales, no wheezing  ABD: Soft, ND, NT, no rebound, no guarding  EXT: Pulses palpable distally  MSK: Normal tone and bulk, no bony tenderness, motor strength intact and symmetrical in all 4 extremities  NEURO: CN II-XII intact; normal cerebellar testing (finger-to-nose, heel-to-shin); no pronator drift; sensation intact throughout  PSYCH: AAOx3, cooperative, appropriate

## 2023-01-20 NOTE — ED ADULT NURSE REASSESSMENT NOTE - NS ED NURSE REASSESS COMMENT FT1
1130 rec'd report   pt sleeping, appears comfortable, ivf infusing  wife at bedside  continue to monitor

## 2023-01-20 NOTE — PATIENT PROFILE ADULT - NSPROGENSOURCEINFO_GEN_A_NUR
Please see if there are cheaper medications for her. Try tudorza instead of Spiriva . The medications are to help her breathing but she can stop them and see how feels if she does not want to pay for them    patient

## 2023-01-20 NOTE — H&P ADULT - ASSESSMENT
Patient is a 57-year-old male with a history of hypertension, dyslipidemia, diabetes, traumatic right eye blindness, Gout, HSV, CVA in March 2022 with minor weakness to LEFT hand. Patient presents for generalized weakness since today. POSITIVE sick contact at work.  Admitted for weakness secondary to Covid +.      A/P:  # Elevated Troponin   - Pt has Loop recorder and was scheduled for Stress test outpatient  - Continue telemetry monitoring  - Repeat cardiac enzymes  - cardiology consult if no resolution   - continue antihypertensive medications  - continue [id number] daily    # Covid+  - Nebulizer w/ 3% saline for mucous congestion  - isolation precautions  - Albuterol prn for wheezing  - Pulse ox q shift   - Tylenol prn Fever  - if Resp sxs begin will start Remdesnivir and Decadron  - Supportive treatment at present for symptoms  - IV Hydration & PO intake    # DM2  - Continue Metformin & prandin  - FS qAc & hs  - Insulin SS prn  - Carb consistent diet    # Weakness  - Fall precations  - PT eval    # VTE /GI prophylaxis   Patient is a 57-year-old male with a history of hypertension, dyslipidemia, diabetes, traumatic right eye blindness, Gout, HSV, CVA in March 2022 with minor weakness to LEFT hand. Patient presents for generalized weakness since today. POSITIVE sick contact at work.  Admitted for weakness secondary to Covid +.      A/P:  # Elevated Troponin   - Pt has Loop recorder and was scheduled for Stress test outpatient  - Continue telemetry monitoring  - Repeat cardiac enzymes  - cardiology consult if no resolution   - continue antihypertensive medications  - continue [id number] daily    # Covid+  - Saline spray for congestion - will give neb 3% saline if in reverse isolation room  - isolation precautions  - Albuterol prn for wheezing  - Pulse ox q shift   - Tylenol prn Fever  - if Resp sxs begin will start Remdesnivir and Decadron  - Supportive treatment at present for symptoms  - IV Hydration & PO intake    # DM2  - Continue Metformin & prandin  - FS qAc & hs  - Insulin SS prn  - Carb consistent diet    # Weakness  - Fall precations  - PT eval    # VTE /GI prophylaxis

## 2023-01-20 NOTE — H&P ADULT - NSICDXPASTMEDICALHX_GEN_ALL_CORE_FT
PAST MEDICAL HISTORY:  Blindness of right eye     Chronic kidney disease (CKD)     CVA (cerebrovascular accident)     Diabetes mellitus     Gout     Hyperlipidemia     Hypertension

## 2023-01-20 NOTE — ED ADULT NURSE NOTE - NSICDXPASTMEDICALHX_GEN_ALL_CORE_FT
PAST MEDICAL HISTORY:  Chronic kidney disease (CKD)     CVA (cerebrovascular accident)     Diabetes mellitus     Gout     Hyperlipidemia     Hypertension

## 2023-01-20 NOTE — PHYSICAL THERAPY INITIAL EVALUATION ADULT - SPECIFY REASON(S)
Pt refused to participate in PT at this time secondary to feeling tired. Will follow-up as appropriate. RN aware.

## 2023-01-21 LAB
A1C WITH ESTIMATED AVERAGE GLUCOSE RESULT: 10.7 % — HIGH (ref 4–5.6)
ALBUMIN SERPL ELPH-MCNC: 3.8 G/DL — SIGNIFICANT CHANGE UP (ref 3.5–5.2)
ALP SERPL-CCNC: 65 U/L — SIGNIFICANT CHANGE UP (ref 30–115)
ALT FLD-CCNC: 23 U/L — SIGNIFICANT CHANGE UP (ref 0–41)
ANION GAP SERPL CALC-SCNC: 13 MMOL/L — SIGNIFICANT CHANGE UP (ref 7–14)
AST SERPL-CCNC: 30 U/L — SIGNIFICANT CHANGE UP (ref 0–41)
BASOPHILS # BLD AUTO: 0.03 K/UL — SIGNIFICANT CHANGE UP (ref 0–0.2)
BASOPHILS NFR BLD AUTO: 0.5 % — SIGNIFICANT CHANGE UP (ref 0–1)
BILIRUB SERPL-MCNC: 0.4 MG/DL — SIGNIFICANT CHANGE UP (ref 0.2–1.2)
BUN SERPL-MCNC: 20 MG/DL — SIGNIFICANT CHANGE UP (ref 10–20)
CALCIUM SERPL-MCNC: 8.7 MG/DL — SIGNIFICANT CHANGE UP (ref 8.4–10.5)
CHLORIDE SERPL-SCNC: 106 MMOL/L — SIGNIFICANT CHANGE UP (ref 98–110)
CK MB CFR SERPL CALC: 2.9 NG/ML — SIGNIFICANT CHANGE UP (ref 0.6–6.3)
CK SERPL-CCNC: 510 U/L — HIGH (ref 0–225)
CO2 SERPL-SCNC: 23 MMOL/L — SIGNIFICANT CHANGE UP (ref 17–32)
CREAT SERPL-MCNC: 1.4 MG/DL — SIGNIFICANT CHANGE UP (ref 0.7–1.5)
EGFR: 58 ML/MIN/1.73M2 — LOW
EOSINOPHIL # BLD AUTO: 0 K/UL — SIGNIFICANT CHANGE UP (ref 0–0.7)
EOSINOPHIL NFR BLD AUTO: 0 % — SIGNIFICANT CHANGE UP (ref 0–8)
ESTIMATED AVERAGE GLUCOSE: 260 MG/DL — HIGH (ref 68–114)
GLUCOSE BLDC GLUCOMTR-MCNC: 147 MG/DL — HIGH (ref 70–99)
GLUCOSE BLDC GLUCOMTR-MCNC: 166 MG/DL — HIGH (ref 70–99)
GLUCOSE BLDC GLUCOMTR-MCNC: 177 MG/DL — HIGH (ref 70–99)
GLUCOSE BLDC GLUCOMTR-MCNC: 190 MG/DL — HIGH (ref 70–99)
GLUCOSE SERPL-MCNC: 156 MG/DL — HIGH (ref 70–99)
HCT VFR BLD CALC: 44.7 % — SIGNIFICANT CHANGE UP (ref 42–52)
HGB BLD-MCNC: 13.6 G/DL — LOW (ref 14–18)
IMM GRANULOCYTES NFR BLD AUTO: 0.7 % — HIGH (ref 0.1–0.3)
LYMPHOCYTES # BLD AUTO: 1.32 K/UL — SIGNIFICANT CHANGE UP (ref 1.2–3.4)
LYMPHOCYTES # BLD AUTO: 23 % — SIGNIFICANT CHANGE UP (ref 20.5–51.1)
MAGNESIUM SERPL-MCNC: 2 MG/DL — SIGNIFICANT CHANGE UP (ref 1.8–2.4)
MCHC RBC-ENTMCNC: 20 PG — LOW (ref 27–31)
MCHC RBC-ENTMCNC: 30.4 G/DL — LOW (ref 32–37)
MCV RBC AUTO: 65.6 FL — LOW (ref 80–94)
MONOCYTES # BLD AUTO: 0.8 K/UL — HIGH (ref 0.1–0.6)
MONOCYTES NFR BLD AUTO: 13.9 % — HIGH (ref 1.7–9.3)
NEUTROPHILS # BLD AUTO: 3.56 K/UL — SIGNIFICANT CHANGE UP (ref 1.4–6.5)
NEUTROPHILS NFR BLD AUTO: 61.9 % — SIGNIFICANT CHANGE UP (ref 42.2–75.2)
NRBC # BLD: 0 /100 WBCS — SIGNIFICANT CHANGE UP (ref 0–0)
PLATELET # BLD AUTO: 211 K/UL — SIGNIFICANT CHANGE UP (ref 130–400)
POTASSIUM SERPL-MCNC: 3.8 MMOL/L — SIGNIFICANT CHANGE UP (ref 3.5–5)
POTASSIUM SERPL-SCNC: 3.8 MMOL/L — SIGNIFICANT CHANGE UP (ref 3.5–5)
PROT SERPL-MCNC: 6.2 G/DL — SIGNIFICANT CHANGE UP (ref 6–8)
RBC # BLD: 6.81 M/UL — HIGH (ref 4.7–6.1)
RBC # FLD: 17.6 % — HIGH (ref 11.5–14.5)
SODIUM SERPL-SCNC: 142 MMOL/L — SIGNIFICANT CHANGE UP (ref 135–146)
TROPONIN T SERPL-MCNC: 0.07 NG/ML — CRITICAL HIGH
WBC # BLD: 5.75 K/UL — SIGNIFICANT CHANGE UP (ref 4.8–10.8)
WBC # FLD AUTO: 5.75 K/UL — SIGNIFICANT CHANGE UP (ref 4.8–10.8)

## 2023-01-21 RX ADMIN — SODIUM CHLORIDE 75 MILLILITER(S): 9 INJECTION, SOLUTION INTRAVENOUS at 05:38

## 2023-01-21 RX ADMIN — Medication 1: at 12:03

## 2023-01-21 RX ADMIN — Medication 81 MILLIGRAM(S): at 12:03

## 2023-01-21 RX ADMIN — Medication 1: at 16:55

## 2023-01-21 RX ADMIN — REPAGLINIDE 2 MILLIGRAM(S): 1 TABLET ORAL at 06:46

## 2023-01-21 RX ADMIN — REPAGLINIDE 2 MILLIGRAM(S): 1 TABLET ORAL at 12:04

## 2023-01-21 RX ADMIN — SODIUM CHLORIDE 75 MILLILITER(S): 9 INJECTION, SOLUTION INTRAVENOUS at 22:08

## 2023-01-21 RX ADMIN — ATORVASTATIN CALCIUM 80 MILLIGRAM(S): 80 TABLET, FILM COATED ORAL at 22:08

## 2023-01-21 RX ADMIN — ENOXAPARIN SODIUM 40 MILLIGRAM(S): 100 INJECTION SUBCUTANEOUS at 16:57

## 2023-01-21 RX ADMIN — Medication 300 MILLIGRAM(S): at 12:04

## 2023-01-21 RX ADMIN — LISINOPRIL 40 MILLIGRAM(S): 2.5 TABLET ORAL at 05:17

## 2023-01-21 RX ADMIN — AMLODIPINE BESYLATE 10 MILLIGRAM(S): 2.5 TABLET ORAL at 05:17

## 2023-01-21 RX ADMIN — REPAGLINIDE 2 MILLIGRAM(S): 1 TABLET ORAL at 16:56

## 2023-01-21 NOTE — PROGRESS NOTE ADULT - SUBJECTIVE AND OBJECTIVE BOX
MEDICINE ATTENDING PROGRESS NOTE  57-year-old male with a history of hypertension, dyslipidemia, diabetes, traumatic right eye blindness, Gout, HSV, CVA in March 2022 with minor weakness to LEFT hand. Patient presents for generalized weakness since today. POSITIVE sick contact at work.  Admitted for weakness secondary to Covid +.    Interval/Overnight events:  01/21/23 (Late Entry): Seen and examined patient at bedside. Not in acute distress. Patient wants to go home. Patient is sating 100% RA and 96% on a 6-min walk. Vitals stable. Exam unchanged.  Plan is to cont current management    ROS:   General: denies fever, chills, insomnia, depression, weight change  Skin: denies itch, jaundice   Resp: denies cough, pleuritic chest pain, wheezing   CV: denies PND  GI: denies N/V/D constipation   : denies d/c, itching, hematuria, dysuria   EXT: denies pain, swelling, erythema   Musculoskeletal: denies low back pain, joint pain, swelling   Neuro: denies headache, weakness, syncope    MEDICATIONS  (STANDING):  allopurinol 300 milliGRAM(s) Oral daily  amLODIPine   Tablet 10 milliGRAM(s) Oral daily  aspirin enteric coated 81 milliGRAM(s) Oral daily  atorvastatin 80 milliGRAM(s) Oral at bedtime  dextrose 5%. 1000 milliLiter(s) (50 mL/Hr) IV Continuous <Continuous>  dextrose 5%. 1000 milliLiter(s) (100 mL/Hr) IV Continuous <Continuous>  dextrose 50% Injectable 25 Gram(s) IV Push once  enoxaparin Injectable 40 milliGRAM(s) SubCutaneous every 24 hours  glucagon  Injectable 1 milliGRAM(s) IntraMuscular once  insulin lispro (ADMELOG) corrective regimen sliding scale   SubCutaneous three times a day before meals  lactated ringers. 1000 milliLiter(s) (75 mL/Hr) IV Continuous <Continuous>  lisinopril 40 milliGRAM(s) Oral daily  repaglinide 2 milliGRAM(s) Oral three times a day before meals    MEDICATIONS  (PRN):  acetaminophen     Tablet .. 650 milliGRAM(s) Oral every 6 hours PRN Temp greater or equal to 38C (100.4F), Mild Pain (1 - 3)  albuterol    90 MICROgram(s) HFA Inhaler 2 Puff(s) Inhalation every 6 hours PRN Shortness of Breath and/or Wheezing  aluminum hydroxide/magnesium hydroxide/simethicone Suspension 30 milliLiter(s) Oral every 4 hours PRN Dyspepsia  dextrose Oral Gel 15 Gram(s) Oral once PRN Blood Glucose LESS THAN 70 milliGRAM(s)/deciliter  melatonin 3 milliGRAM(s) Oral at bedtime PRN Insomnia  ondansetron Injectable 4 milliGRAM(s) IV Push every 8 hours PRN Nausea and/or Vomiting  sodium chloride 0.65% Nasal 1 Spray(s) Both Nostrils every 8 hours PRN Nasal Congestion    VITALS  T(F): 96.9 (01-22-23 @ 05:21), Max: 97.6 (01-21-23 @ 21:11)  HR: 77 (01-22-23 @ 05:21) (77 - 80)  BP: 160/88 (01-22-23 @ 05:21) (151/88 - 160/88)  RR: 16 (01-22-23 @ 05:21) (16 - 16)  SpO2: 98% (01-22-23 @ 10:32) (98% - 98%)    PHYSICAL EXAM  Gen- NAD, AAOx3  HEENT- no pallor, anicteric, moist mucous membranes  CVS- S1/S2, no m/r/g  Chest- CTA b/l no w/r/c, no use of accessory muscles, good inspiratory effort, speaking in full sentences  Abd- soft, nt, nd  Ext- no edema, pulses intact b/l  Neuro-CN II-XII intact;    LABS/IMAGING  01-22    143  |  105  |  18  ----------------------------<  157<H>  4.2   |  27  |  1.3    Ca    8.9      22 Jan 2023 07:45  Mg     2.1     01-22    TPro  6.4  /  Alb  3.8  /  TBili  0.4  /  DBili  x   /  AST  31  /  ALT  24  /  AlkPhos  63  01-22    LIVER FUNCTIONS - ( 22 Jan 2023 07:45 )  Alb: 3.8 g/dL / Pro: 6.4 g/dL / ALK PHOS: 63 U/L / ALT: 24 U/L / AST: 31 U/L / GGT: x                                13.8   4.72  )-----------( 226      ( 22 Jan 2023 07:45 )             46.5     CARDIAC MARKERS ( 22 Jan 2023 07:45 )  x     / 0.03 ng/mL / x     / x     / x      CARDIAC MARKERS ( 21 Jan 2023 07:40 )  x     / 0.07 ng/mL / 510 U/L / x     / 2.9 ng/mL  CARDIAC MARKERS ( 20 Jan 2023 19:37 )  x     / 0.06 ng/mL / 595 U/L / x     / 4.8 ng/mL        MICROBIOLOGY  COVID +    IMAGE  CXR: No acute lung pathology. Calcified plaque in the aortic arch.

## 2023-01-21 NOTE — PROGRESS NOTE ADULT - ASSESSMENT
A/P: 57-year-old male with a history of hypertension, dyslipidemia, diabetes, traumatic right eye blindness, Gout, HSV, CVA in March 2022 with minor weakness to LEFT hand. Patient presents for generalized weakness since today. POSITIVE sick contact at work.  Admitted for weakness secondary to Covid +.    #NSTEMI Type 2 due to demand ischemia  #Calcified Plaque in the aortic arch  - Trop: 0.03 > 0.07 > 0.03  - EKG: Normal sinus rhythm. Right superior axis deviation. No ST changes  - CXR:  Calcified plaque in the aortic arch.   - continue [id number] daily  - Continue telemetry monitoring  - Pt has Loop recorder and was scheduled for Stress test outpatient  - cardiology consult if no resolution     # Covid+  - CXR: no acute lung pathology  - Saline spray for congestion   - will give neb 3% saline if in reverse isolation room  - isolation precautions  - Albuterol prn for wheezing  - Pulse ox q shift   - Tylenol prn Fever  - if Resp sxs begin or hypoxia < 92%,  will start Remdesnivir and Decadron  - Supportive treatment at present for symptoms  - IV Hydration & PO intake    # DM2  - hold Metformin & prandin  - FS qAc & hs  - Insulin SS prn  - Carb consistent diet    # Weakness in setting of infection  - Fall precautions  - PT eval    # VTE /GI prophylaxis    Spent over 35 min reviewing chart and on coordinating patient care during interdisciplinary rounds     Jose Santos M.D./[facility]  Attending Physician

## 2023-01-21 NOTE — CHART NOTE - NSCHARTNOTEFT_GEN_A_CORE
pt seen and examined,  no acute complaint  denies respiratory distress  Vitals stable  labs and imaging reviewed  Will cont current management + will incentive spirometry  wife has been updated pt seen and examined,  no acute complaint  denies respiratory distress  Vitals stable  labs and imaging reviewed  Will cont current management + will incentive spirometry  wife has been updated  will do an in-depth, thorough chart review tomorrow.

## 2023-01-22 ENCOUNTER — TRANSCRIPTION ENCOUNTER (OUTPATIENT)
Age: 58
End: 2023-01-22

## 2023-01-22 VITALS — OXYGEN SATURATION: 98 %

## 2023-01-22 LAB
ALBUMIN SERPL ELPH-MCNC: 3.8 G/DL — SIGNIFICANT CHANGE UP (ref 3.5–5.2)
ALP SERPL-CCNC: 63 U/L — SIGNIFICANT CHANGE UP (ref 30–115)
ALT FLD-CCNC: 24 U/L — SIGNIFICANT CHANGE UP (ref 0–41)
ANION GAP SERPL CALC-SCNC: 11 MMOL/L — SIGNIFICANT CHANGE UP (ref 7–14)
ANISOCYTOSIS BLD QL: SLIGHT — SIGNIFICANT CHANGE UP
AST SERPL-CCNC: 31 U/L — SIGNIFICANT CHANGE UP (ref 0–41)
BASOPHILS # BLD AUTO: 0.03 K/UL — SIGNIFICANT CHANGE UP (ref 0–0.2)
BASOPHILS NFR BLD AUTO: 0.6 % — SIGNIFICANT CHANGE UP (ref 0–1)
BILIRUB SERPL-MCNC: 0.4 MG/DL — SIGNIFICANT CHANGE UP (ref 0.2–1.2)
BUN SERPL-MCNC: 18 MG/DL — SIGNIFICANT CHANGE UP (ref 10–20)
CALCIUM SERPL-MCNC: 8.9 MG/DL — SIGNIFICANT CHANGE UP (ref 8.4–10.5)
CHLORIDE SERPL-SCNC: 105 MMOL/L — SIGNIFICANT CHANGE UP (ref 98–110)
CO2 SERPL-SCNC: 27 MMOL/L — SIGNIFICANT CHANGE UP (ref 17–32)
CREAT SERPL-MCNC: 1.3 MG/DL — SIGNIFICANT CHANGE UP (ref 0.7–1.5)
CRP SERPL-MCNC: 36 MG/L — HIGH
CRP SERPL-MCNC: 45 MG/L — HIGH
EGFR: 64 ML/MIN/1.73M2 — SIGNIFICANT CHANGE UP
EOSINOPHIL # BLD AUTO: 0.08 K/UL — SIGNIFICANT CHANGE UP (ref 0–0.7)
EOSINOPHIL NFR BLD AUTO: 1.7 % — SIGNIFICANT CHANGE UP (ref 0–8)
ERYTHROCYTE [SEDIMENTATION RATE] IN BLOOD: 10 MM/HR — SIGNIFICANT CHANGE UP (ref 0–10)
FERRITIN SERPL-MCNC: 188 NG/ML — SIGNIFICANT CHANGE UP (ref 30–400)
GLUCOSE BLDC GLUCOMTR-MCNC: 136 MG/DL — HIGH (ref 70–99)
GLUCOSE BLDC GLUCOMTR-MCNC: 215 MG/DL — HIGH (ref 70–99)
GLUCOSE SERPL-MCNC: 157 MG/DL — HIGH (ref 70–99)
HCT VFR BLD CALC: 46.5 % — SIGNIFICANT CHANGE UP (ref 42–52)
HGB BLD-MCNC: 13.8 G/DL — LOW (ref 14–18)
IMM GRANULOCYTES NFR BLD AUTO: 0.4 % — HIGH (ref 0.1–0.3)
LDH SERPL L TO P-CCNC: 210 U/L — SIGNIFICANT CHANGE UP (ref 50–242)
LYMPHOCYTES # BLD AUTO: 1.77 K/UL — SIGNIFICANT CHANGE UP (ref 1.2–3.4)
LYMPHOCYTES # BLD AUTO: 37.5 % — SIGNIFICANT CHANGE UP (ref 20.5–51.1)
MAGNESIUM SERPL-MCNC: 2.1 MG/DL — SIGNIFICANT CHANGE UP (ref 1.8–2.4)
MANUAL SMEAR VERIFICATION: SIGNIFICANT CHANGE UP
MCHC RBC-ENTMCNC: 19.9 PG — LOW (ref 27–31)
MCHC RBC-ENTMCNC: 29.7 G/DL — LOW (ref 32–37)
MCV RBC AUTO: 66.9 FL — LOW (ref 80–94)
MONOCYTES # BLD AUTO: 0.88 K/UL — HIGH (ref 0.1–0.6)
MONOCYTES NFR BLD AUTO: 18.6 % — HIGH (ref 1.7–9.3)
NEUTROPHILS # BLD AUTO: 1.94 K/UL — SIGNIFICANT CHANGE UP (ref 1.4–6.5)
NEUTROPHILS NFR BLD AUTO: 41.2 % — LOW (ref 42.2–75.2)
NRBC # BLD: 0 /100 WBCS — SIGNIFICANT CHANGE UP (ref 0–0)
NRBC # BLD: 0 /100 — SIGNIFICANT CHANGE UP (ref 0–0)
PLAT MORPH BLD: NORMAL — SIGNIFICANT CHANGE UP
PLATELET # BLD AUTO: 226 K/UL — SIGNIFICANT CHANGE UP (ref 130–400)
POTASSIUM SERPL-MCNC: 4.2 MMOL/L — SIGNIFICANT CHANGE UP (ref 3.5–5)
POTASSIUM SERPL-SCNC: 4.2 MMOL/L — SIGNIFICANT CHANGE UP (ref 3.5–5)
PROCALCITONIN SERPL-MCNC: 0.23 NG/ML — HIGH (ref 0.02–0.1)
PROCALCITONIN SERPL-MCNC: 0.26 NG/ML — HIGH (ref 0.02–0.1)
PROT SERPL-MCNC: 6.4 G/DL — SIGNIFICANT CHANGE UP (ref 6–8)
RBC # BLD: 6.95 M/UL — HIGH (ref 4.7–6.1)
RBC # FLD: 18.2 % — HIGH (ref 11.5–14.5)
RBC BLD AUTO: NORMAL — SIGNIFICANT CHANGE UP
SODIUM SERPL-SCNC: 143 MMOL/L — SIGNIFICANT CHANGE UP (ref 135–146)
TROPONIN T SERPL-MCNC: 0.03 NG/ML — CRITICAL HIGH
VARIANT LYMPHS # BLD: 2 % — SIGNIFICANT CHANGE UP (ref 0–5)
WBC # BLD: 4.72 K/UL — LOW (ref 4.8–10.8)
WBC # FLD AUTO: 4.72 K/UL — LOW (ref 4.8–10.8)

## 2023-01-22 PROCEDURE — 99239 HOSP IP/OBS DSCHRG MGMT >30: CPT

## 2023-01-22 RX ADMIN — LISINOPRIL 40 MILLIGRAM(S): 2.5 TABLET ORAL at 05:16

## 2023-01-22 RX ADMIN — REPAGLINIDE 2 MILLIGRAM(S): 1 TABLET ORAL at 11:49

## 2023-01-22 RX ADMIN — Medication 81 MILLIGRAM(S): at 11:48

## 2023-01-22 RX ADMIN — Medication 2: at 11:49

## 2023-01-22 RX ADMIN — Medication 300 MILLIGRAM(S): at 11:48

## 2023-01-22 RX ADMIN — AMLODIPINE BESYLATE 10 MILLIGRAM(S): 2.5 TABLET ORAL at 05:16

## 2023-01-22 NOTE — DISCHARGE NOTE NURSING/CASE MANAGEMENT/SOCIAL WORK - PATIENT PORTAL LINK FT
You can access the FollowMyHealth Patient Portal offered by [facility] by registering at the following website: [email or web address]. By joining [facility]’s FollowMyHealth portal, you will also be able to view your health information using other applications (apps) compatible with our system.

## 2023-01-22 NOTE — CHART NOTE - NSCHARTNOTEFT_GEN_A_CORE
To whom it may concern:     Patient MELANIE PIKE was admitted to the hospital on 1/20/23.     Patient needs to be on isolation until 1/29/30.                                Dr. Jose Santos

## 2023-01-22 NOTE — DISCHARGE NOTE PROVIDER - HOSPITAL COURSE
A 57-year-old male with a history of hypertension, dyslipidemia, diabetes, traumatic right eye blindness, Gout, HSV, CVA in March 2022 with minor weakness to LEFT hand. Patient presents for generalized weakness since today. POSITIVE sick contact at work. Admitted for weakness secondary to Covid +.  saturating 96 RA , isolation percaution. Pt seen by physical therapy. Pt found with  Elevated Troponin improved, was monitored on telemetry  to follow up with cardiology as outpt.  pt to follow up with pcp.    57-year-old male with a history of hypertension, dyslipidemia, diabetes, traumatic right eye blindness, Gout, HSV, CVA in March 2022 with minor weakness to LEFT hand. Patient presents for generalized weakness since today. POSITIVE sick contact at work. Admitted for weakness secondary to Covid +.    #NSTEMI Type 2 due to demand ischemia  #Calcified Plaque in the aortic arch  - Trop: 0.03 > 0.07 > 0.03  - EKG: Normal sinus rhythm. Right superior axis deviation. No ST changes  - CXR:  Calcified plaque in the aortic arch.   - continue [id number] daily  - Continue telemetry monitoring  - Pt has Loop recorder and was scheduled for Stress test outpatient  - follow up with cardiology as outpt.      # Covid+  - CXR: no acute lung pathology  - Saline spray for congestion   - s/p neb 3% saline if in reverse isolation room  - isolation precautions  - Albuterol prn for wheezing  - Tylenol prn Fever  - if Resp sxs begin or hypoxia < 92%,  will start Remdesnivir and Decadron  - Supportive treatment at present for symptoms  - s/p IV Hydration -> PO intake  - pt to follow up with pcp.     # DM2  - resume Metformin & prandin    - Carb consistent diet    # Weakness in setting of infection  - PT eval: Home PT

## 2023-01-22 NOTE — DISCHARGE NOTE PROVIDER - CARE PROVIDER_API CALL
Cardiology,   follow up with you cardiologist as scheduled  Phone: (   )    -  Fax: (   )    -  Follow Up Time:     PCP,   Phone: (   )    -  Fax: (   )    -  Follow Up Time: 1 week

## 2023-01-22 NOTE — DISCHARGE NOTE PROVIDER - NSDCMRMEDTOKEN_GEN_ALL_CORE_FT
allopurinol 300 mg oral tablet: 1 tab(s) orally once a day  amLODIPine 10 mg oral tablet: 1 tab(s) orally once a day  aspirin 81 mg oral delayed release tablet: 1 tab(s) orally once a day  lisinopril 40 mg oral tablet: 1 tab(s) orally once a day  repaglinide 2 mg oral tablet: 1 tab(s) orally 3 times a day (before meals)  rosuvastatin 40 mg oral tablet: 1 tab(s) orally once a day  Synjardy 12.5 mg-1000 mg oral tablet: 1 tab(s) orally 2 times a day  Tradjenta 5 mg oral tablet: 1 tab(s) orally once a day

## 2023-01-22 NOTE — DISCHARGE NOTE PROVIDER - PROVIDER TOKENS
FREE:[LAST:[Cardiology],PHONE:[(   )    -],FAX:[(   )    -],ADDRESS:[follow up with you cardiologist as scheduled]],FREE:[LAST:[PCP],PHONE:[(   )    -],FAX:[(   )    -],FOLLOWUP:[1 week]]

## 2023-01-22 NOTE — DISCHARGE NOTE NURSING/CASE MANAGEMENT/SOCIAL WORK - NSDCPEFALRISK_GEN_ALL_CORE
For information on Fall & Injury Prevention, visit: [email or web address] OR  [email or web address] OR  https://www.cdc.gov/steadi/patient.html

## 2023-01-22 NOTE — DISCHARGE NOTE PROVIDER - NSDCCPCAREPLAN_GEN_ALL_CORE_FT
PRINCIPAL DISCHARGE DIAGNOSIS  Diagnosis: 2019 novel coronavirus disease (COVID-19)  Assessment and Plan of Treatment: saturating well on room air, isolation percaution, follow up with your pcp.      SECONDARY DISCHARGE DIAGNOSES  Diagnosis: Elevated troponin  Assessment and Plan of Treatment: troponin improved, you were monitored on telemery , follow up with your cardiologist for stress test as scheduled or with Dr. Lieberman    Diagnosis: Generalized weakness  Assessment and Plan of Treatment: you were seen by physical therapy

## 2023-01-22 NOTE — DISCHARGE NOTE PROVIDER - NSDCFUSCHEDAPPT_GEN_ALL_CORE_FT
[facility]  CARDIOLOGY [address]  Scheduled Appointment: 02/10/2023    [facility]  ELECTROPH [address]  Scheduled Appointment: 04/07/2023

## 2023-01-23 LAB
T4 AB SER-ACNC: 6.2 UG/DL — SIGNIFICANT CHANGE UP (ref 4.6–12)
TSH SERPL-MCNC: 0.9 UIU/ML — SIGNIFICANT CHANGE UP (ref 0.27–4.2)

## 2023-01-26 DIAGNOSIS — E78.5 HYPERLIPIDEMIA, UNSPECIFIED: ICD-10-CM

## 2023-01-26 DIAGNOSIS — M10.9 GOUT, UNSPECIFIED: ICD-10-CM

## 2023-01-26 DIAGNOSIS — I70.90 UNSPECIFIED ATHEROSCLEROSIS: ICD-10-CM

## 2023-01-26 DIAGNOSIS — Z86.19 PERSONAL HISTORY OF OTHER INFECTIOUS AND PARASITIC DISEASES: ICD-10-CM

## 2023-01-26 DIAGNOSIS — U07.1 COVID-19: ICD-10-CM

## 2023-01-26 DIAGNOSIS — I24.8 OTHER FORMS OF ACUTE ISCHEMIC HEART DISEASE: ICD-10-CM

## 2023-01-26 DIAGNOSIS — Z20.822 CONTACT WITH AND (SUSPECTED) EXPOSURE TO COVID-19: ICD-10-CM

## 2023-01-26 DIAGNOSIS — I69.334 MONOPLEGIA OF UPPER LIMB FOLLOWING CEREBRAL INFARCTION AFFECTING LEFT NON-DOMINANT SIDE: ICD-10-CM

## 2023-01-26 DIAGNOSIS — E11.9 TYPE 2 DIABETES MELLITUS WITHOUT COMPLICATIONS: ICD-10-CM

## 2023-01-26 DIAGNOSIS — I21.A1 MYOCARDIAL INFARCTION TYPE 2: ICD-10-CM

## 2023-01-26 DIAGNOSIS — R74.8 ABNORMAL LEVELS OF OTHER SERUM ENZYMES: ICD-10-CM

## 2023-01-26 DIAGNOSIS — I10 ESSENTIAL (PRIMARY) HYPERTENSION: ICD-10-CM

## 2023-02-10 ENCOUNTER — NON-APPOINTMENT (OUTPATIENT)
Age: 58
End: 2023-02-10

## 2023-02-10 ENCOUNTER — APPOINTMENT (OUTPATIENT)
Dept: CARDIOLOGY | Facility: CLINIC | Age: 58
End: 2023-02-10
Payer: COMMERCIAL

## 2023-02-10 PROCEDURE — 93298 REM INTERROG DEV EVAL SCRMS: CPT

## 2023-02-10 PROCEDURE — G2066: CPT

## 2023-02-14 PROBLEM — H54.40 BLINDNESS, ONE EYE, UNSPECIFIED EYE: Chronic | Status: ACTIVE | Noted: 2023-01-20

## 2023-02-14 PROBLEM — E78.5 HYPERLIPIDEMIA, UNSPECIFIED: Chronic | Status: ACTIVE | Noted: 2023-01-20

## 2023-02-14 PROBLEM — E11.9 TYPE 2 DIABETES MELLITUS WITHOUT COMPLICATIONS: Chronic | Status: ACTIVE | Noted: 2023-01-20

## 2023-02-14 PROBLEM — M10.9 GOUT, UNSPECIFIED: Chronic | Status: ACTIVE | Noted: 2023-01-20

## 2023-02-14 PROBLEM — I63.9 CEREBRAL INFARCTION, UNSPECIFIED: Chronic | Status: ACTIVE | Noted: 2023-01-20

## 2023-02-14 PROBLEM — I10 ESSENTIAL (PRIMARY) HYPERTENSION: Chronic | Status: ACTIVE | Noted: 2023-01-20

## 2023-02-14 PROBLEM — N18.9 CHRONIC KIDNEY DISEASE, UNSPECIFIED: Chronic | Status: ACTIVE | Noted: 2023-01-20

## 2023-02-21 ENCOUNTER — NON-APPOINTMENT (OUTPATIENT)
Age: 58
End: 2023-02-21

## 2023-03-15 ENCOUNTER — NON-APPOINTMENT (OUTPATIENT)
Age: 58
End: 2023-03-15

## 2023-03-15 ENCOUNTER — APPOINTMENT (OUTPATIENT)
Dept: CARDIOLOGY | Facility: CLINIC | Age: 58
End: 2023-03-15
Payer: COMMERCIAL

## 2023-03-15 PROCEDURE — 93298 REM INTERROG DEV EVAL SCRMS: CPT

## 2023-03-15 PROCEDURE — G2066: CPT

## 2023-04-07 ENCOUNTER — APPOINTMENT (OUTPATIENT)
Dept: ELECTROPHYSIOLOGY | Facility: CLINIC | Age: 58
End: 2023-04-07
Payer: COMMERCIAL

## 2023-04-07 VITALS
RESPIRATION RATE: 16 BRPM | HEIGHT: 71 IN | BODY MASS INDEX: 30.8 KG/M2 | TEMPERATURE: 97.1 F | SYSTOLIC BLOOD PRESSURE: 130 MMHG | DIASTOLIC BLOOD PRESSURE: 78 MMHG | WEIGHT: 220 LBS

## 2023-04-07 DIAGNOSIS — Z45.09 ENCOUNTER FOR ADJUSTMENT AND MANAGEMENT OF OTHER CARDIAC DEVICE: ICD-10-CM

## 2023-04-07 DIAGNOSIS — I63.213 CEREBRAL INFARCTION DUE TO UNSPECIFIED OCCLUSION OR STENOSIS OF BILATERAL VERTEBRAL ARTERIES: ICD-10-CM

## 2023-04-07 PROCEDURE — 93000 ELECTROCARDIOGRAM COMPLETE: CPT

## 2023-04-07 PROCEDURE — 99215 OFFICE O/P EST HI 40 MIN: CPT | Mod: 25

## 2023-04-28 ENCOUNTER — APPOINTMENT (OUTPATIENT)
Dept: ELECTROPHYSIOLOGY | Facility: CLINIC | Age: 58
End: 2023-04-28
Payer: COMMERCIAL

## 2023-04-28 VITALS
HEIGHT: 71 IN | SYSTOLIC BLOOD PRESSURE: 130 MMHG | HEART RATE: 86 BPM | WEIGHT: 218 LBS | BODY MASS INDEX: 30.52 KG/M2 | TEMPERATURE: 98 F | DIASTOLIC BLOOD PRESSURE: 88 MMHG

## 2023-04-28 PROCEDURE — 99215 OFFICE O/P EST HI 40 MIN: CPT | Mod: 57,25

## 2023-04-28 PROCEDURE — 93000 ELECTROCARDIOGRAM COMPLETE: CPT

## 2023-05-04 PROBLEM — Z45.09 ENCOUNTER FOR INTERROGATION OF CARDIAC RECORDER: Status: ACTIVE | Noted: 2022-08-03

## 2023-05-04 PROBLEM — I63.213 CEREBROVASCULAR ACCIDENT (CVA) DUE TO BILATERAL STENOSIS OF VERTEBRAL ARTERIES: Status: ACTIVE | Noted: 2022-03-23

## 2023-05-04 NOTE — ASSESSMENT
[FreeTextEntry1] : s/p ILR implant on 3/8/2022 for long term surveillance for occult afib. Patient having WCT on ILR since implant\par \par Patient called his cardiologist Dr. Oconnell while in office with me. She said that his stress test was normal but his EF had dropped because maybe he was having a lot of PVCs. \par \par - Episodes as described above. Episodes suspicious for NSVT and VT\par - Had extensive conversation with the patient regarding work-up needed for ischemia and possible need for AICD.\par - Reviewed case with Dr. Sexton. We recommend that patient obtain cardiac MRI and CTA cardiac to rule out ischemia, LGE.\par \par Patient unsure at this time if he wants to follow up with us in in [place] with his cardiologist. I explained that he is at risk for sudden cardiac death due to findings on ILR until work up rules this out. He understands.\par \par RTO in 4-6 weeks after testing with Dr. Sexton\par \par \par \par \par \par \par \par .\par \par \par \par \par \par

## 2023-05-04 NOTE — CARDIOLOGY SUMMARY
[de-identified] : 4/7/2023: sinus rhythm 93 bpm, LVH\par ECG ( 1/6/20230 87bpm sinus rhythm with 1st degree\par ECG ( 7/29/2022) 83bpm sinus rhythm   ms, QRS 106ms\par ECG (4/7/2022): NSR at 79 bpm, \par EKG (3/15/22): SR@80, QRSd 110, \par  [de-identified] : TTE ( 11/19/2022) LVEF 65-70%\par ROMINA (03/22): Nl EF, Mod LVH, Mild LAE, intact IAS [de-identified] : 3/8/2022 - MDT ILR implant

## 2023-05-04 NOTE — PROCEDURE
[de-identified] : 83 BPM [de-identified] : ELIZA [de-identified] : TIANAQ LNQ11 [de-identified] : [id number] [de-identified] : 3/8/2022 [de-identified] : Good [de-identified] : 12 tachy episodes\par 1 tachy episode on 3/30/23 WCT cannot r/o NSVT lasting 9 sec. (avg. v-rate: 182 bpm).\par 1 episode on 2/16 with ST with offset into NCT ~2 minutes\par 1 episode 2/3/2023 ST with offset 3 beats WCT followed by NCT\par 1 episode 1/6/2023 WCT cannot r/o NSVT 20 seconds

## 2023-05-04 NOTE — HISTORY OF PRESENT ILLNESS
[de-identified] : \par Mr. PIKE is a 57 year- old male with history of HTN, DM type 2, DLD, traumatic Right eye blindness. admitted to [facility] thru ED for Left arm weakness. He received TPA. MRI of the head showed small acute infarct in the Right caudate and Eight high precentral gyrus. ROMINA showed no PFO, no Thrombus. ILR was implanted on 3/8/2022 for cryptogenic stroke long term surveillance.\par \par \par \par \par He returns for follow up for routine device interrogation\par \par Denies chest pain, shortness of breath, palpitation, dizziness, presyncope or syncope. Denies recurrent TIA/CVA symptoms.\par \par Cardiologist : Dr. Dr. Michelle Oconnell  ( [place])  \par \par \par

## 2023-05-07 NOTE — CARDIOLOGY SUMMARY
[de-identified] : \par ECG ( 1/6/20230 87bpm sinus rhythm with 1st degree\par ECG ( 7/29/2022) 83bpm sinus rhythm   ms, QRS 106ms\par ECG (4/7/2022): NSR at 79 bpm, \par EKG (3/15/22): SR@80, QRSd 110, \par  [de-identified] : TTE ( 11/19/2022) LVEF 65-70%\par ROMINA (03/22): Nl EF, Mod LVH, Mild LAE, intact IAS [de-identified] : 3/8/2022 - MDT ILR implant

## 2023-05-07 NOTE — ADDENDUM
[FreeTextEntry1] : IAriana assisted in documentation on 05/01/2023   acting as a scribe for Dr. Andrzej Sexton.\par

## 2023-05-07 NOTE — ASSESSMENT
[FreeTextEntry1] : Hypertrophic Cardiomyopathy \par - Given extensive myocardial scar, confirmed hypertrophic cardiomyopathy on MRI, and non-sustained VT, patient is a candidate for primary prevention ICD.\par - I have discussed with patient and wife the procedure itself and the reasoning behind my recommendations. After long discussion, patient and wife would like to proceed with the procedure.\par - Patient is an excellent candidate for primary prevention ICD implant  I have explained the procedure to the patient.  I have explained the risks and benefits of the procedure to the patient.  There is approximately 1% chance of any major cardiovascular complication to occur. Complications include, but are not limited to infection, bleeding, damage to the vessels, pneumothorax, stroke, death and heart attack.  The patient understands the risk and would like to proceed with the procedure. Patient indicated that all of his questions were answered to his satisfaction and verbalized understanding.\par - Continue Metoprolol 25 mg once a day.\par - Will proceed with genetic testing for the patient. I have also discussed the possibility of testing the patient's children.\par \par \par Hypertension \par - Patient's pressure is well-controlled. \par - Continue Amlodipine 10 mg once a day. \par - Continue Lisinopril 40 mg once a day. \par \par \par \par \par \par

## 2023-05-07 NOTE — PROCEDURE
[No] : not [NSR] : normal sinus rhythm [Longevity: ___ months] : The estimated remaining battery life is [unfilled] months [Sensing Amplitude ___mv] : sensing amplitude was [unfilled] mv [None] : none [Programmed for Longevity] : output reprogrammed for improved battery longevity [de-identified] : Medtronic [de-identified] : LNQ11 [de-identified] : [id number] [de-identified] : 3/8/22 [de-identified] : 1 SVT episode lasting 4 minutes with avg. v-rate = 154bpm. \par Transmitting on [facility]. \par Normal device function.

## 2023-05-07 NOTE — HISTORY OF PRESENT ILLNESS
[de-identified] : \par Mr. PIKE is a 57 year- old male with history of HTN, DM type 2, DLD, traumatic Right eye blindness. admitted to [facility] thru ED for Left arm weakness. He received TPA. MRI of the head showed small acute infarct in the Right caudate and Eight high precentral gyrus. ROMINA showed no PFO, no Thrombus. ILR was implanted on 3/8/2022 for cryptogenic stroke long term surveillance.\par \par He returns for follow up for routine device interrogation\par \par Denies chest pain, shortness of breath, palpitation, dizziness, presyncope or syncope. Denies recurrent TIA/CVA symptoms.\par \par Patient was recently found to have hypertrophic cardiomyopathy. No family history of sudden cardiac death. No syncope or chest pain at this time. \par \par \par \par MRI (04/2023): Asymmetric hypertrophy involving the basal and midseptal walls, 2.1 cm, and distal left ventricle measuring 1.7 cm, consistent with hypertrophic cardiomyopathy. Extensive patchy myocardial scarring in apical and mid-myocardial aspect of the basal distal septum, lateral wall, and distal apex. Ejection fraction of normal. LV burden 14%.\par \par CTA (04/2023): Minimal obstructive coronary disease, ground-glass nodules in the right mid-lobe. Recommend chest CT follow-up in 6-12 months. \par \par \par Cardiologist : Dr. Dr. Michelle Oconnell  ( [place])

## 2023-05-17 ENCOUNTER — OUTPATIENT (OUTPATIENT)
Dept: OUTPATIENT SERVICES | Facility: HOSPITAL | Age: 58
LOS: 1 days | End: 2023-05-17
Payer: COMMERCIAL

## 2023-05-17 VITALS
HEART RATE: 80 BPM | WEIGHT: 214.07 LBS | RESPIRATION RATE: 16 BRPM | OXYGEN SATURATION: 98 % | DIASTOLIC BLOOD PRESSURE: 74 MMHG | HEIGHT: 71 IN | TEMPERATURE: 99 F | SYSTOLIC BLOOD PRESSURE: 156 MMHG

## 2023-05-17 DIAGNOSIS — Z98.890 OTHER SPECIFIED POSTPROCEDURAL STATES: Chronic | ICD-10-CM

## 2023-05-17 DIAGNOSIS — Z01.818 ENCOUNTER FOR OTHER PREPROCEDURAL EXAMINATION: ICD-10-CM

## 2023-05-17 DIAGNOSIS — I50.22 CHRONIC SYSTOLIC (CONGESTIVE) HEART FAILURE: ICD-10-CM

## 2023-05-17 LAB
ALBUMIN SERPL ELPH-MCNC: 4.7 G/DL — SIGNIFICANT CHANGE UP (ref 3.5–5.2)
ALP SERPL-CCNC: 55 U/L — SIGNIFICANT CHANGE UP (ref 30–115)
ALT FLD-CCNC: 47 U/L — HIGH (ref 0–41)
ANION GAP SERPL CALC-SCNC: 16 MMOL/L — HIGH (ref 7–14)
APPEARANCE UR: CLEAR — SIGNIFICANT CHANGE UP
APTT BLD: 29.6 SEC — SIGNIFICANT CHANGE UP (ref 27–39.2)
AST SERPL-CCNC: 44 U/L — HIGH (ref 0–41)
BACTERIA # UR AUTO: NEGATIVE — SIGNIFICANT CHANGE UP
BASOPHILS # BLD AUTO: 0.04 K/UL — SIGNIFICANT CHANGE UP (ref 0–0.2)
BASOPHILS NFR BLD AUTO: 0.8 % — SIGNIFICANT CHANGE UP (ref 0–1)
BILIRUB SERPL-MCNC: 0.3 MG/DL — SIGNIFICANT CHANGE UP (ref 0.2–1.2)
BILIRUB UR-MCNC: NEGATIVE — SIGNIFICANT CHANGE UP
BUN SERPL-MCNC: 22 MG/DL — HIGH (ref 10–20)
CALCIUM SERPL-MCNC: 9.3 MG/DL — SIGNIFICANT CHANGE UP (ref 8.4–10.5)
CHLORIDE SERPL-SCNC: 106 MMOL/L — SIGNIFICANT CHANGE UP (ref 98–110)
CO2 SERPL-SCNC: 23 MMOL/L — SIGNIFICANT CHANGE UP (ref 17–32)
COLOR SPEC: SIGNIFICANT CHANGE UP
CREAT SERPL-MCNC: 1.5 MG/DL — SIGNIFICANT CHANGE UP (ref 0.7–1.5)
DIFF PNL FLD: NEGATIVE — SIGNIFICANT CHANGE UP
EGFR: 54 ML/MIN/1.73M2 — LOW
EOSINOPHIL # BLD AUTO: 0.11 K/UL — SIGNIFICANT CHANGE UP (ref 0–0.7)
EOSINOPHIL NFR BLD AUTO: 2.3 % — SIGNIFICANT CHANGE UP (ref 0–8)
EPI CELLS # UR: 0 /HPF — SIGNIFICANT CHANGE UP (ref 0–5)
GLUCOSE SERPL-MCNC: 89 MG/DL — SIGNIFICANT CHANGE UP (ref 70–99)
GLUCOSE UR QL: ABNORMAL
HCT VFR BLD CALC: 43.7 % — SIGNIFICANT CHANGE UP (ref 42–52)
HGB BLD-MCNC: 13 G/DL — LOW (ref 14–18)
HYALINE CASTS # UR AUTO: 0 /LPF — SIGNIFICANT CHANGE UP (ref 0–7)
IMM GRANULOCYTES NFR BLD AUTO: 0.4 % — HIGH (ref 0.1–0.3)
INR BLD: 0.94 RATIO — SIGNIFICANT CHANGE UP (ref 0.65–1.3)
KETONES UR-MCNC: NEGATIVE — SIGNIFICANT CHANGE UP
LEUKOCYTE ESTERASE UR-ACNC: NEGATIVE — SIGNIFICANT CHANGE UP
LYMPHOCYTES # BLD AUTO: 2.04 K/UL — SIGNIFICANT CHANGE UP (ref 1.2–3.4)
LYMPHOCYTES # BLD AUTO: 42.7 % — SIGNIFICANT CHANGE UP (ref 20.5–51.1)
MCHC RBC-ENTMCNC: 19.8 PG — LOW (ref 27–31)
MCHC RBC-ENTMCNC: 29.7 G/DL — LOW (ref 32–37)
MCV RBC AUTO: 66.7 FL — LOW (ref 80–94)
MONOCYTES # BLD AUTO: 0.51 K/UL — SIGNIFICANT CHANGE UP (ref 0.1–0.6)
MONOCYTES NFR BLD AUTO: 10.7 % — HIGH (ref 1.7–9.3)
MRSA PCR RESULT.: NEGATIVE — SIGNIFICANT CHANGE UP
NEUTROPHILS # BLD AUTO: 2.06 K/UL — SIGNIFICANT CHANGE UP (ref 1.4–6.5)
NEUTROPHILS NFR BLD AUTO: 43.1 % — SIGNIFICANT CHANGE UP (ref 42.2–75.2)
NITRITE UR-MCNC: NEGATIVE — SIGNIFICANT CHANGE UP
NRBC # BLD: 0 /100 WBCS — SIGNIFICANT CHANGE UP (ref 0–0)
PH UR: 6 — SIGNIFICANT CHANGE UP (ref 5–8)
PLATELET # BLD AUTO: 216 K/UL — SIGNIFICANT CHANGE UP (ref 130–400)
PMV BLD: 10.1 FL — SIGNIFICANT CHANGE UP (ref 7.4–10.4)
POTASSIUM SERPL-MCNC: 4.3 MMOL/L — SIGNIFICANT CHANGE UP (ref 3.5–5)
POTASSIUM SERPL-SCNC: 4.3 MMOL/L — SIGNIFICANT CHANGE UP (ref 3.5–5)
PROT SERPL-MCNC: 7.4 G/DL — SIGNIFICANT CHANGE UP (ref 6–8)
PROT UR-MCNC: ABNORMAL
PROTHROM AB SERPL-ACNC: 10.7 SEC — SIGNIFICANT CHANGE UP (ref 9.95–12.87)
RBC # BLD: 6.55 M/UL — HIGH (ref 4.7–6.1)
RBC # FLD: 19.1 % — HIGH (ref 11.5–14.5)
RBC CASTS # UR COMP ASSIST: 1 /HPF — SIGNIFICANT CHANGE UP (ref 0–4)
SODIUM SERPL-SCNC: 145 MMOL/L — SIGNIFICANT CHANGE UP (ref 135–146)
SP GR SPEC: 1.03 — SIGNIFICANT CHANGE UP (ref 1.01–1.03)
UROBILINOGEN FLD QL: SIGNIFICANT CHANGE UP
WBC # BLD: 4.78 K/UL — LOW (ref 4.8–10.8)
WBC # FLD AUTO: 4.78 K/UL — LOW (ref 4.8–10.8)
WBC UR QL: 0 /HPF — SIGNIFICANT CHANGE UP (ref 0–5)

## 2023-05-17 PROCEDURE — 87641 MR-STAPH DNA AMP PROBE: CPT

## 2023-05-17 PROCEDURE — 81001 URINALYSIS AUTO W/SCOPE: CPT

## 2023-05-17 PROCEDURE — 36415 COLL VENOUS BLD VENIPUNCTURE: CPT

## 2023-05-17 PROCEDURE — 80053 COMPREHEN METABOLIC PANEL: CPT

## 2023-05-17 PROCEDURE — 85730 THROMBOPLASTIN TIME PARTIAL: CPT

## 2023-05-17 PROCEDURE — 87640 STAPH A DNA AMP PROBE: CPT

## 2023-05-17 PROCEDURE — 85025 COMPLETE CBC W/AUTO DIFF WBC: CPT

## 2023-05-17 PROCEDURE — 87086 URINE CULTURE/COLONY COUNT: CPT

## 2023-05-17 PROCEDURE — 99214 OFFICE O/P EST MOD 30 MIN: CPT | Mod: 25

## 2023-05-17 PROCEDURE — 85610 PROTHROMBIN TIME: CPT

## 2023-05-17 RX ORDER — LINAGLIPTIN 5 MG/1
1 TABLET, FILM COATED ORAL
Qty: 0 | Refills: 0 | DISCHARGE

## 2023-05-17 NOTE — H&P PST ADULT - HISTORY OF PRESENT ILLNESS
57yr old male with h /o stroke 2022 d/t vertebral artery stenosis - further wk up revealed Hypertrophic CM, s/p loop recorder -showed NSVT-is now scheduled for ICD. Denies COVID S/S. Recd 3 doses vaccine. Verbalized understanding of COVID prevention measures. Exercise rian 2 FOS.  Anesthesia Alert  YES--Difficult Airway class 4  NO--History of neck surgery or radiation  NO--Limited ROM of neck  NO--History of Malignant hyperthermia  NO--Personal or family history of Pseudocholinesterase deficiency  NO--Prior Anesthesia Complication  NO--Latex Allergy  NO--Loose teeth  NO--History of Rheumatoid Arthritis  NO--CAROLINA  No Bleeding risk  NO--Other_____

## 2023-05-18 ENCOUNTER — FORM ENCOUNTER (OUTPATIENT)
Age: 58
End: 2023-05-18

## 2023-05-18 ENCOUNTER — APPOINTMENT (OUTPATIENT)
Dept: CARDIOLOGY | Facility: CLINIC | Age: 58
End: 2023-05-18

## 2023-05-18 DIAGNOSIS — Z01.818 ENCOUNTER FOR OTHER PREPROCEDURAL EXAMINATION: ICD-10-CM

## 2023-05-18 DIAGNOSIS — I50.22 CHRONIC SYSTOLIC (CONGESTIVE) HEART FAILURE: ICD-10-CM

## 2023-05-18 LAB
CULTURE RESULTS: SIGNIFICANT CHANGE UP
SPECIMEN SOURCE: SIGNIFICANT CHANGE UP

## 2023-05-31 ENCOUNTER — INPATIENT (INPATIENT)
Facility: HOSPITAL | Age: 58
LOS: 0 days | Discharge: HOME | DRG: 227 | End: 2023-06-01
Attending: STUDENT IN AN ORGANIZED HEALTH CARE EDUCATION/TRAINING PROGRAM | Admitting: STUDENT IN AN ORGANIZED HEALTH CARE EDUCATION/TRAINING PROGRAM
Payer: COMMERCIAL

## 2023-05-31 ENCOUNTER — APPOINTMENT (OUTPATIENT)
Dept: ELECTROPHYSIOLOGY | Facility: HOSPITAL | Age: 58
End: 2023-05-31

## 2023-05-31 ENCOUNTER — TRANSCRIPTION ENCOUNTER (OUTPATIENT)
Age: 58
End: 2023-05-31

## 2023-05-31 VITALS
HEIGHT: 70.87 IN | SYSTOLIC BLOOD PRESSURE: 144 MMHG | DIASTOLIC BLOOD PRESSURE: 80 MMHG | HEART RATE: 81 BPM | RESPIRATION RATE: 16 BRPM | OXYGEN SATURATION: 99 %

## 2023-05-31 DIAGNOSIS — Z98.890 OTHER SPECIFIED POSTPROCEDURAL STATES: Chronic | ICD-10-CM

## 2023-05-31 DIAGNOSIS — I50.22 CHRONIC SYSTOLIC (CONGESTIVE) HEART FAILURE: ICD-10-CM

## 2023-05-31 LAB
GLUCOSE BLDC GLUCOMTR-MCNC: 163 MG/DL — HIGH (ref 70–99)
GLUCOSE BLDC GLUCOMTR-MCNC: 176 MG/DL — HIGH (ref 70–99)
GLUCOSE BLDC GLUCOMTR-MCNC: 220 MG/DL — HIGH (ref 70–99)

## 2023-05-31 PROCEDURE — C1721: CPT

## 2023-05-31 PROCEDURE — 71045 X-RAY EXAM CHEST 1 VIEW: CPT

## 2023-05-31 PROCEDURE — C1898: CPT

## 2023-05-31 PROCEDURE — 33249 INSJ/RPLCMT DEFIB W/LEAD(S): CPT

## 2023-05-31 PROCEDURE — 71046 X-RAY EXAM CHEST 2 VIEWS: CPT

## 2023-05-31 PROCEDURE — 82962 GLUCOSE BLOOD TEST: CPT

## 2023-05-31 PROCEDURE — C1777: CPT

## 2023-05-31 PROCEDURE — 71045 X-RAY EXAM CHEST 1 VIEW: CPT | Mod: 26

## 2023-05-31 PROCEDURE — 93005 ELECTROCARDIOGRAM TRACING: CPT

## 2023-05-31 PROCEDURE — C1892: CPT

## 2023-05-31 PROCEDURE — C1769: CPT

## 2023-05-31 PROCEDURE — C1889: CPT

## 2023-05-31 RX ORDER — REPAGLINIDE 1 MG/1
2 TABLET ORAL
Refills: 0 | Status: DISCONTINUED | OUTPATIENT
Start: 2023-05-31 | End: 2023-06-01

## 2023-05-31 RX ORDER — CEFAZOLIN SODIUM 1 G
1000 VIAL (EA) INJECTION ONCE
Refills: 0 | Status: DISCONTINUED | OUTPATIENT
Start: 2023-05-31 | End: 2023-06-01

## 2023-05-31 RX ORDER — CEFAZOLIN SODIUM 1 G
2000 VIAL (EA) INJECTION ONCE
Refills: 0 | Status: DISCONTINUED | OUTPATIENT
Start: 2023-05-31 | End: 2023-06-01

## 2023-05-31 RX ORDER — ASPIRIN/CALCIUM CARB/MAGNESIUM 324 MG
81 TABLET ORAL DAILY
Refills: 0 | Status: DISCONTINUED | OUTPATIENT
Start: 2023-05-31 | End: 2023-06-01

## 2023-05-31 RX ORDER — CEFAZOLIN SODIUM 1 G
1000 VIAL (EA) INJECTION EVERY 8 HOURS
Refills: 0 | Status: COMPLETED | OUTPATIENT
Start: 2023-05-31 | End: 2023-06-01

## 2023-05-31 RX ORDER — PIOGLITAZONE HYDROCHLORIDE 15 MG/1
1 TABLET ORAL
Refills: 0 | DISCHARGE

## 2023-05-31 RX ORDER — ACETAMINOPHEN 500 MG
650 TABLET ORAL EVERY 6 HOURS
Refills: 0 | Status: DISCONTINUED | OUTPATIENT
Start: 2023-05-31 | End: 2023-06-01

## 2023-05-31 RX ORDER — LISINOPRIL 2.5 MG/1
40 TABLET ORAL DAILY
Refills: 0 | Status: DISCONTINUED | OUTPATIENT
Start: 2023-05-31 | End: 2023-06-01

## 2023-05-31 RX ORDER — ROSUVASTATIN CALCIUM 5 MG/1
1 TABLET ORAL
Qty: 0 | Refills: 0 | DISCHARGE

## 2023-05-31 RX ORDER — EMPAGLIFLOZIN, METFORMIN HYDROCHLORIDE 10; 1000 MG/1; MG/1
1 TABLET, EXTENDED RELEASE ORAL
Qty: 0 | Refills: 0 | DISCHARGE

## 2023-05-31 RX ORDER — INSULIN DEGLUDEC 100 U/ML
32 INJECTION, SOLUTION SUBCUTANEOUS
Refills: 0 | DISCHARGE

## 2023-05-31 RX ORDER — LANOLIN ALCOHOL/MO/W.PET/CERES
3 CREAM (GRAM) TOPICAL AT BEDTIME
Refills: 0 | Status: DISCONTINUED | OUTPATIENT
Start: 2023-05-31 | End: 2023-06-01

## 2023-05-31 RX ORDER — LISINOPRIL 2.5 MG/1
1 TABLET ORAL
Qty: 0 | Refills: 0 | DISCHARGE

## 2023-05-31 RX ORDER — REPAGLINIDE 1 MG/1
1 TABLET ORAL
Qty: 0 | Refills: 0 | DISCHARGE

## 2023-05-31 RX ORDER — GABAPENTIN 400 MG/1
1 CAPSULE ORAL
Qty: 0 | Refills: 0 | DISCHARGE

## 2023-05-31 RX ORDER — AMLODIPINE BESYLATE 2.5 MG/1
10 TABLET ORAL DAILY
Refills: 0 | Status: DISCONTINUED | OUTPATIENT
Start: 2023-05-31 | End: 2023-06-01

## 2023-05-31 RX ORDER — ALLOPURINOL 300 MG
300 TABLET ORAL DAILY
Refills: 0 | Status: DISCONTINUED | OUTPATIENT
Start: 2023-05-31 | End: 2023-06-01

## 2023-05-31 RX ORDER — ONDANSETRON 8 MG/1
4 TABLET, FILM COATED ORAL ONCE
Refills: 0 | Status: COMPLETED | OUTPATIENT
Start: 2023-05-31 | End: 2023-05-31

## 2023-05-31 RX ORDER — ALLOPURINOL 300 MG
1 TABLET ORAL
Qty: 0 | Refills: 0 | DISCHARGE

## 2023-05-31 RX ORDER — HYDROMORPHONE HYDROCHLORIDE 2 MG/ML
1 INJECTION INTRAMUSCULAR; INTRAVENOUS; SUBCUTANEOUS
Refills: 0 | Status: DISCONTINUED | OUTPATIENT
Start: 2023-05-31 | End: 2023-05-31

## 2023-05-31 RX ADMIN — HYDROMORPHONE HYDROCHLORIDE 1 MILLIGRAM(S): 2 INJECTION INTRAMUSCULAR; INTRAVENOUS; SUBCUTANEOUS at 15:40

## 2023-05-31 RX ADMIN — Medication 3 MILLIGRAM(S): at 22:31

## 2023-05-31 RX ADMIN — Medication 100 MILLIGRAM(S): at 22:31

## 2023-05-31 RX ADMIN — ONDANSETRON 4 MILLIGRAM(S): 8 TABLET, FILM COATED ORAL at 19:03

## 2023-05-31 NOTE — DISCHARGE NOTE PROVIDER - NSDCCPTREATMENT_GEN_ALL_CORE_FT
PRINCIPAL PROCEDURE  Procedure: Implantation of biv ICD  Findings and Treatment: - Please take Keflex 500 mg (Bactrim / doxycycline 100 mg) twice daily for 5 days.  - Do not shower for 5 days.  - Do not drive or operate heavy machinery for 1 week.  - Do not submerge in water (example: baths, swimming) for 1 month.  - Do not lift your left arm greater than shoulder height for 6 weeks.  - Do not lift anything heavier than 5-10 lbs with your left arm for 6 weeks.  - Any sudden swelling, redness, fever, discharge, or severe pain, call the Electrophysiology Office at [phone].     PRINCIPAL PROCEDURE  Procedure: Implantation of biv ICD  Findings and Treatment: - Please take Keflex 500 mg twice daily for 5 days.  - Do not shower for 5 days.  - Do not drive or operate heavy machinery for 1 week.  - Do not submerge in water (example: baths, swimming) for 1 month.  - Do not lift your left arm greater than shoulder height for 6 weeks.  - Do not lift anything heavier than 5-10 lbs with your left arm for 6 weeks.  - Any sudden swelling, redness, fever, discharge, or severe pain, call the Electrophysiology Office at [phone].

## 2023-05-31 NOTE — PATIENT PROFILE ADULT - FALL HARM RISK - HARM RISK INTERVENTIONS

## 2023-05-31 NOTE — DISCHARGE NOTE PROVIDER - NSDCFUSCHEDAPPT_GEN_ALL_CORE_FT
[facility] Physician [facility]  CARDIOLOGY [address]  Scheduled Appointment: 06/22/2023     [facility]  CARDIOLOGY [address]  Scheduled Appointment: 06/22/2023    [facility]  ELECTROPH [address]  Scheduled Appointment: 06/29/2023

## 2023-05-31 NOTE — DISCHARGE NOTE PROVIDER - ATTENDING DISCHARGE PHYSICAL EXAMINATION:
Left chest wall incision site and area of ILR removal are clean, dry, and intact with no hematoma or tenderness.

## 2023-05-31 NOTE — DISCHARGE NOTE PROVIDER - CARE PROVIDER_API CALL
Andrzej Sexton  Cardiac Electrophysiology  [address] [place] [address], [address] [address]  Phone: [phone]  Fax: [phone]  Follow Up Time: 1 month   Andrzej Sexton  Cardiac Electrophysiology  [address] [place] [address], [address] [address]  Phone: [phone]  Fax: [phone]  Scheduled Appointment: 06/29/2023

## 2023-05-31 NOTE — PRE-ANESTHESIA EVALUATION ADULT - NSANTHASARD_GEN_ALL_CORE
Full EGD note dictated. In short:    Pt has severe portal hypertensive gastropathy which is the source of pt's intermittent and continuous bleeding. PLAN:  -  Will push nonselective b-blocker therapy for portal HTN. Starting propranolol 10 mg tid. 4

## 2023-05-31 NOTE — DISCHARGE NOTE PROVIDER - NSDCMRMEDTOKEN_GEN_ALL_CORE_FT
allopurinol 300 mg oral tablet: 1 tab(s) orally once a day  amLODIPine 10 mg oral tablet: 1 tab(s) orally once a day  aspirin 81 mg oral delayed release tablet: 1 tab(s) orally once a day  lisinopril 40 mg oral tablet: 1 tab(s) orally once a day  pioglitazone 30 mg oral tablet: 1 orally once a day  repaglinide 2 mg oral tablet: 1 tab(s) orally 3 times a day (before meals)  rosuvastatin 40 mg oral tablet: 1 tab(s) orally once a day  Synjardy 12.5 mg-1000 mg oral tablet: 1 tab(s) orally 2 times a day  Tresiba 100 units/mL subcutaneous solution: 32 subcutaneous once a day (at bedtime)   allopurinol 300 mg oral tablet: 1 tab(s) orally once a day  amLODIPine 10 mg oral tablet: 1 tab(s) orally once a day  aspirin 81 mg oral delayed release tablet: 1 tab(s) orally once a day  cephalexin 500 mg oral tablet: 1 tab(s) orally 2 times a day  lisinopril 40 mg oral tablet: 1 tab(s) orally once a day  pioglitazone 30 mg oral tablet: 1 orally once a day  repaglinide 2 mg oral tablet: 1 tab(s) orally 3 times a day (before meals)  rosuvastatin 40 mg oral tablet: 1 tab(s) orally once a day  Synjardy 12.5 mg-1000 mg oral tablet: 1 tab(s) orally 2 times a day  Tresiba 100 units/mL subcutaneous solution: 32 subcutaneous once a day (at bedtime)

## 2023-05-31 NOTE — DISCHARGE NOTE PROVIDER - HOSPITAL COURSE
57yr old male with h /o stroke 2022 d/t vertebral artery stenosis - further wk up revealed Hypertrophic CM, s/p loop recorder -showed NSVT indicating elective ICD implantation. On 5/31/23, patient underwent successful (Insert device type including leads and device company ie. Medtronic Dual Chamber ICD) implantation. Patient was monitored overnight. On POD 1 patient remains HD stable with no complaints. Examination of ICD pocket is C/D/I with no hematoma or erythema. Device interrogation reveals normal device function and CXR was negative for pneumothorax. Patient is being DC home in stable condition. 57yr old male with h /o stroke 2022 d/t vertebral artery stenosis - further work up revealed Hypertrophic CM, s/p loop recorder -showed NSVT indicating elective ICD implantation. On 5/31/23, patient underwent successful Medtronic Dual Chamber ICD implantation. Patient was monitored overnight. On POD 1 patient remains HD stable with no complaints. Examination of ICD pocket is C/D/I with no hematoma or erythema. Device interrogation reveals normal device function and CXR was negative for pneumothorax. Patient is being DC home in stable condition.

## 2023-05-31 NOTE — PRE-ANESTHESIA EVALUATION ADULT - NSANTHGENDERRD_ENT_A_CORE
Problem: Patient Care Overview  Goal: Plan of Care Review  Outcome: Ongoing (interventions implemented as appropriate)  No respiratory distress noted at this time. Will continue to monitor pt.       Yes

## 2023-05-31 NOTE — DISCHARGE NOTE PROVIDER - NSDCCPCAREPLAN_GEN_ALL_CORE_FT
PRINCIPAL DISCHARGE DIAGNOSIS  Diagnosis: CHF with cardiomyopathy  Assessment and Plan of Treatment:

## 2023-05-31 NOTE — DISCHARGE NOTE PROVIDER - PROVIDER TOKENS
PROVIDER:[TOKEN:[[id number]],FOLLOWUP:[1 month]] PROVIDER:[TOKEN:[[id number]],SCHEDULEDAPPT:[06/29/2023]]

## 2023-05-31 NOTE — ASU PATIENT PROFILE, ADULT - FALL HARM RISK - UNIVERSAL INTERVENTIONS
Bed in lowest position, wheels locked, appropriate side rails in place/Call bell, personal items and telephone in reach/Instruct patient to call for assistance before getting out of bed or chair/Non-slip footwear when patient is out of bed/[place] to call system/Physically safe environment - no spills, clutter or unnecessary equipment/Purposeful Proactive Rounding/Room/bathroom lighting operational, light cord in reach

## 2023-05-31 NOTE — DISCHARGE NOTE PROVIDER - ATTENDING ATTESTATION STATEMENT
H&P History of Present Illness





- General


Date of Service: 19


Admit Problem/Dx: 


 Admission Diagnosis/Problem





Admission Diagnosis/Problem      Abscess








Source of Information: Patient


History Limitations: Reports: No Limitations





- History of Present Illness


Initial Comments - Free Text/Narative: 





Lei Norman is a 82 y/o M with PMH of chronic pain, prostate radiation, 

urethral stricture s/p dilatation, frequent UTIs, chronic incontinence of urine

, dementia. 


He recently finished prolonged course of abx with ceftriaxone and micafungin 

for pelvic abscess and pubic symphysis osteomyelitis, His blood cx had grown 

strep viridans and abscess cx while on abx grew candida glabrata


For 2 weeks prior to this admssion he started having right lower abdominal 

pain and low grade fever. CT scan of the abdomen and pelvis on 19 showed 


evidence of abscess formation in the pectineus muscle and also osteomyelitis of 

the pelvis. [facility]. [facility] Image 

guided aspiration of a RIGHT upper thigh fluid collection on , cxs positive 

for strep species. He was managed for recurrent persistent pelvic abscess and

chronicosteomyelitis of the pubic symphysis. ID was consulted and recommended 

zosyn andMicafungin to complete 6 weeks course. Patient was sent to [facility] to complete antibiotics and also to continue PT/OT. At the time 

of this evaluation patient denies and the symptom. No chest pain, shortness of 

breath, fever, chills, nausea, vomiting.














Improves with: Reports: None


Worsens with: Reports: None


Associated Symptoms: Reports: No Other Symptoms


  ** Right Ankle


Pain Score (Numeric/FACES): 3





- Related Data


Allergies/Adverse Reactions: 


 Allergies











Allergy/AdvReac Type Severity Reaction Status Date / Time


 


lorazepam [From Ativan] Allergy  Confusion Verified 19 11:45


 


morphine Allergy  Confusion Verified 19 11:45


 


nitrofurantoin Allergy  Other Verified 19 11:45





[From Macrobid]     


 


oxybutynin Allergy  Cannot Verified 19 11:45





   Remember  


 


phenazopyridine Allergy  Other Verified 19 11:45





[From Pyridium]     











Home Medications: 


 Home Meds





Tamsulosin [Flomax] 0.4 mg PO BEDTIME 14 [History]


Acetaminophen [Tylenol] 650 mg PO Q4H PRN 18 [History]


Ascorbate Calcium [Vitamin C] 500 mg PO BID 18 [History]


Dexamethasone 0.5 mg PO DAILY 18 [History]


Donepezil HCl [Aricept] 10 mg PO DAILY 18 [History]


Gabapentin [Neurontin] 100 mg PO TID 18 [History]


L.acidoph,Paracasei, B.lactis [Probiotic] 1 each PO DAILY 18 [History]


busPIRone [Buspar] 20 mg PO TID 18 [History]


Alendronate Sodium [Fosamax] 70 mg PO WEEKLY 18 [History]


Bicalutamide [Casodex] 50 mg PO DAILY 18 [History]


Celecoxib [CeleBREX] 100 mg PO DAILY 18 [History]


Cholecalciferol (Vitamin D3) [Vitamin D3] 1,000 unit PO BID 18 [History]


Cranberry 500 mg PO BID 18 [History]


DULoxetine [Cymbalta] 90 mg PO DAILY 18 [History]


Insulin Aspart [NovoLOG] 5 unit SQ WITHLUNCH 18 [History]


Insulin Aspart [NovoLOG] 8 unit SQ ACDINNER 18 [History]


Insulin Glarg,Human.Rec.Analog [Lantus] 28 units SQ BEDTIME 18 [History]


Magnesium Hydroxide [Milk of Magnesia] 15 ml PO DAILY PRN 18 [History]


Melatonin 6 mg PO BEDTIME 18 [History]


Ondansetron [Zofran ODT] 8 mg PO Q8HR PRN 18 [History]


Pantoprazole Sodium [Protonix] 40 mg PO DAILY 18 [History]


Polyethylene Glycol 3350 [MiraLAX] 17 gm PO BID 18 [History]


Sennosides/Docusate Sodium [Senna Laxative Tablet] 2 each PO BID 18 [

History]


oxyCODONE HCl [Oxycodone HCl] 10 mg PO Q4HR 18 [History]


risperiDONE [Risperdal] 0.25 mg PO BEDTIME 18 [History]


Cranberry Conc/Ascorbic Acid [Cystex Cranberry] 2 tab PO BID 18 [History]


Carboxymethylcellulose Sodium [Refresh Tears] 1 drop EYEBOTH TID 19 [

History]


Methenamine Hippurate [Hiprex] 1 tab PO BID 19 [History]


Metoprolol Tartrate 25 mg PO BID 19 [History]


Micafungin [Mycamine] 100 mg IV DAILY 19 [History]


Non-Formulary Medication [NF Drug] 162 - 162.5 mg PO TID 19 [History]


/Hypromellose/Glycerin [Artificial Tears Drops] 1 drop OP TID 19 [

History]


Piperacillin/Tazobactam [Zosyn] 3.375 gm IV Q8H 19 [History]


amLODIPine [Norvasc] 5 mg PO DAILY 19 [History]


hydrALAZINE [Apresoline] 50 mg PO Q12HR 19 [History]











Past Medical History


HEENT History: Reports: Cataract, Impaired Vision, Other (See Below)


Other HEENT History: wears glasses


Cardiovascular History: Reports: High Cholesterol


Respiratory History: Reports: None


Gastrointestinal History: Reports: Chronic Constipation, GERD, Other (See Below)


Other Gastrointestinal History: pelvic absess


Genitourinary History: Reports: BPH, Prostate Disorder, Retention, Urinary, UTI

, Recurrent, Other (See Below)


Other Genitourinary History: dilalation of urethra


Musculoskeletal History: Reports: Back Pain, Chronic, Fracture


Other Musculoskeletal History: left femur surgery


Neurological History: Reports: None


Psychiatric History: Reports: Alzheimers Disease, Anxiety, Dementia, Depression


Endocrine/Metabolic History: Reports: Diabetes, Type II


Hematologic History: Reports: None


Immunologic History: Reports: None


Oncologic (Cancer) History: Reports: Prostate


Dermatologic History: Reports: None





- Infectious Disease History


Infectious Disease History: Reports: Chicken Pox, Measles, Mumps





- Past Surgical History


Head Surgeries/Procedures: Reports: None


HEENT Surgical History: Reports: None, Cataract Surgery


Cardiovascular Surgical History: Reports: None


GI Surgical History: Reports: Colonoscopy


Other Male  Surgeries/Procedures: prostate problems


Musculoskeletal Surgical History: Reports: Other (See Below)


Other Musculoskeletal Surgeries/Procedures:: back surg, R) hip joint replacement





Social & Family History





- Family History


Family Medical History: Noncontributory


Other Hematologic Family History: mother  of cerebral aneurysm





- Tobacco Use


Smoking Status *Q: Former Smoker


Years of Tobacco use: 30


Packs/Tins Daily: 0.5


Used Tobacco, but Quit: Yes


Month/Year Tobacco Last Used: 





- Caffeine Use


Caffeine Use: Reports: Coffee





- Recreational Drug Use


Recreational Drug Use: No





- Living Situation & Occupation


Living situation: Reports: Single, Assisted Living


Occupation: Retired





H&P Review of Systems





- Review of Systems:


Review Of Systems: See Below


General: Reports: No Symptoms


HEENT: Reports: No Symptoms


Pulmonary: Reports: No Symptoms


Cardiovascular: Reports: No Symptoms


Gastrointestinal: Reports: No Symptoms


Genitourinary: Reports: No Symptoms


Musculoskeletal: Reports: No Symptoms


Skin: Reports: No Symptoms


Psychiatric: Reports: No Symptoms


Neurological: Reports: No Symptoms


Hematologic/Lymphatic: Reports: No Symptoms


Immunologic: Reports: No Symptoms





Exam





- Exam


Exam: See Below





- Exam


Quality Assessment: DVT Prophylaxis


General: Alert, Oriented, 4


HEENT: PERRLA, Hearing Intact, Mucosa Moist & Pink, Nares Patent, Normal Nasal 

Septum, Posterior Pharynx Clear, Conjunctiva Clear, EOMI, EACs Clear, TMs Clear


Neck: Supple, Trachea Midline, 2


Lungs: Clear to Auscultation, Normal Respiratory Effort


Cardiovascular: Regular Rate, Regular Rhythm


GI/Abdominal Exam: Normal Bowel Sounds, Soft, Non-Tender, No Organomegaly, No 

Distention, No Abnormal Bruit, No Mass, Pelvis Stable


 (Male) Exam: No Hernia, Normal Inspection, Normal Prostate, Circumcised


Rectal (Males) Exam: Normal Exam, Normal Rectal Tone, Prostate Normal


Back Exam: Normal Inspection, Full Range of Motion, NT


Extremities: Normal Inspection, Normal Range of Motion, Non-Tender, No Pedal 

Edema, Normal Capillary Refill


Skin: Warm, Dry, Intact


Neurological: Cranial Nerves Intact, Reflexes Equal Bilateral


Neuro Extensive - Mental Status: Alert, Oriented x3, Normal Mood/Affect, Normal 

Cognition


Neuro Extensive - Motor, Sensory, Reflexes: CN II-XII Intact, Normal Gait, 

Normal Reflexes


Psychiatric: Alert, Normal Affect, Normal Mood





- Problem List


(1) Osteomyelitis


SNOMED Code(s): 72564531


   ICD Code: M86.9 - OSTEOMYELITIS, UNSPECIFIED   Status: Acute   Current Visit

: Yes   





(2) Abscess


SNOMED Code(s): 037098116


   ICD Code: L02.91 - CUTANEOUS ABSCESS, UNSPECIFIED   Status: Acute   Current 

Visit: Yes   





(3) Weakness generalized


SNOMED Code(s): 44241409


   ICD Code: R53.1 - WEAKNESS   Status: Acute   Current Visit: Yes   





(4) Chronic pain


SNOMED Code(s): 27687215


   ICD Code: G89.29 - OTHER CHRONIC PAIN   Status: Acute   Current Visit: No   


Qualifiers: 


   Chronic pain type: chronic pain syndrome   Qualified Code(s): G89.4 - 

Chronic pain syndrome   





(5) Diabetes


SNOMED Code(s): 17127355


   ICD Code: E11.9 - TYPE 2 DIABETES MELLITUS WITHOUT COMPLICATIONS   Status: 

Acute   Current Visit: No   





(6) Left groin pain


SNOMED Code(s): 634922397


   ICD Code: R10.32 - LEFT LOWER QUADRANT PAIN   Status: Acute   Current Visit: 

No   


Problem List Initiated/Reviewed/Updated: Yes


Orders Last 24hrs: 


 Active Orders 24 hr











 Category Date Time Status


 


 Patient Status [ADT] Routine ADT  19 13:41 Ordered


 


 Ambulate [RC] ASDIRECTED Care  19 13:41 Ordered


 


 Antiembolic Devices [RC] .Routine Care  19 13:45 Ordered


 


 Notify Provider Vital Signs [RC] ASDIRECTED Care  19 13:44 Ordered


 


 VTE/DVT Education [RC] PER UNIT ROUTINE Care  19 13:45 Ordered


 


 Vital Signs [RC] Q4H Care  19 13:41 Ordered


 


 Regular Diet [DIET] Diet  19 Dinner Ordered


 


 BASIC METABOLIC PANEL,BMP [CHEM] Routine Lab  19 13:41 Ordered


 


 CBC WITH AUTO DIFF [HEME] Routine Lab  19 13:41 Ordered


 


 Acetaminophen [Tylenol] Med  19 13:47 Ordered





 650 mg PO Q4H PRN   


 


 Alendronate Sodium [Fosamax] Med  19 14:00 Ordered





 70 mg PO WEEKLY   


 


 Ascorbate Calcium [Vitamin C] Med  19 21:00 Ordered





 500 mg PO BID   


 


 Bicalutamide [Casodex] Med  19 09:00 Ordered





 50 mg PO DAILY   


 


 Carboxymethylcellulose Sodium [Refresh Tears] Med  19 14:00 Ordered





 1 drop EYEBOTH TID   


 


 Celecoxib [CeleBREX] Med  19 09:00 Ordered





 100 mg PO DAILY   


 


 Cholecalciferol (Vitamin D3) [Vitamin D3] Med  19 21:00 Ordered





 1,000 unit PO BID   


 


 Cranberry Conc/Ascorbic Acid [Cystex Cranberry] Med  19 21:00 Ordered





 2 tab PO BID   


 


 Cranberry [Cranberry] Med  19 21:00 Ordered





 500 mg PO BID   


 


 DULoxetine [Cymbalta] Med  19 09:00 Ordered





 90 mg PO DAILY   


 


 Dexamethasone [Dexamethasone] Med  19 09:00 Ordered





 0.5 mg PO DAILY   


 


 Docusate Sodium/Sennosides [Senna Plus] Med  19 21:00 Ordered





 2 each PO BID   


 


 Donepezil [Aricept] Med  19 09:00 Ordered





 10 mg PO DAILY   


 


 Gabapentin [Neurontin] Med  19 14:00 Ordered





 100 mg PO TID   


 


 Heparin Sodium Med  19 13:45 Ordered





 5,000 units SUBCUT Q12H   


 


 Insulin Aspart [NovoLOG] Med  19 12:00 Ordered





 5 unit SQ WITHLUNCH   


 


 Insulin Aspart [NovoLOG] Med  19 17:00 Ordered





 8 unit SQ ACDINNER   


 


 Insulin Glarg,Human.Rec.Analog [LantUS] Med  19 21:00 Ordered





 28 unit SUBCUT BEDTIME   


 


 L.acidoph,Paracasei, B.lactis [Probiotic] Med  19 09:00 Ordered





 1 each PO DAILY   


 


 Magnesium Hydroxide [Milk of Magnesia] Med  19 13:47 Ordered





 15 ml PO DAILY PRN   


 


 Melatonin Med  19 21:00 Ordered





 6 mg PO BEDTIME   


 


 Methenamine Hippurate [Hiprex] Med  19 21:00 Ordered





 1 tab PO BID   


 


 Metoprolol Tartrate [Lopressor] Med  19 21:00 Ordered





 25 mg PO BID   


 


 Micafungin [Mycamine] Med  19 09:00 Ordered





 100 mg IV DAILY   


 


 Ondansetron [Zofran ODT] Med  19 13:47 Ordered





 8 mg PO Q8HR PRN   


 


 /Hypromellose/Glycerin [Artificial Tears Drops] Med  19 14:00 

Ordered





 1 drop OP TID   


 


 Pantoprazole [ProTONIX***] Med  19 09:00 Ordered





 40 mg PO DAILY   


 


 Piperacillin/Tazobactam [Zosyn] Med  19 14:00 Ordered





 3.375 gm IV Q8H   


 


 Polyethylene Glycol 3350 [MiraLAX] Med  19 21:00 Ordered





 17 gm PO BID   


 


 Tamsulosin [Flomax] Med  19 21:00 Ordered





 0.4 mg PO BEDTIME   


 


 amLODIPine [Norvasc] Med  19 09:00 Ordered





 5 mg PO DAILY   


 


 busPIRone [Buspar] Med  19 14:00 Ordered





 20 mg PO TID   


 


 hydrALAZINE [Apresoline] Med  19 21:00 Ordered





 50 mg PO Q12HR   


 


 oxyCODONE HCl [Oxycodone HCl] Med  19 14:00 Ordered





 10 mg PO Q4HR   


 


 risperiDONE [Risperdal] Med  19 21:00 Ordered





 0.25 mg PO BEDTIME   


 


 Antiembolic Hose [OM.PC] Per Unit Routine Oth  19 13:46 Ordered


 


 DVT/VTE Prophylaxis Reflex [OM.PC] Routine Oth  19 13:41 Ordered











Assessment/Plan Comment:: 





#Recurrent persistent pelvic abscess andchronicosteomyelitis of the pubic 

symphysis


Continue micafungin and Zosyn


- To complete 6 weeks course





#Acute kidney injury. Etiology unclear,urine eos neg


-likely secondary to contrast associated nephropathy as well as allergic 

interstitial inflammation secondary to antibiotics and infection all causing 

possible ischemic injury versus ATN


Nephrology evaluated at [facility]





#Dementia with agitation


Continue Buspar, Cymbalta, and Risperdal





#Prostate cancer/chronic urinary retention: Patient with history of prostate 

cancer status post radiation therapy. Has retention with intermittent self-

catheterization at home.


Polanco catheter placement


- Routine polanco care. 





#Microcytic anemia/anemia of chronic inflammation


stable


Monitor CBC





#Hypertension


- BP at goal


-continue home medications


Monitor blood pressures closely





#Diabetes: On insulin at home.


 Continue NovoLog and Lantus


Sliding scale insulin with hypoglycemia protocol


Accu-Cheks at mealtime and at bedtime.





# Full code I have personally seen and examined the patient. I have collaborated with and supervised the

## 2023-05-31 NOTE — DISCHARGE NOTE PROVIDER - NSDCQMPCI_CARD_ALL_CORE
RIGOBERTO    Spoke with patient and she said she has productive cough, bringing up green phlegm, father was dx with COVID 19 and she was with him a few days. Booked her for video visit today at 4:20 PM (she could not do 2:40 PM). Informed patient that 11/21 office appt and lab appt need to be cancelled.    No

## 2023-06-01 ENCOUNTER — TRANSCRIPTION ENCOUNTER (OUTPATIENT)
Age: 58
End: 2023-06-01

## 2023-06-01 VITALS
TEMPERATURE: 98 F | RESPIRATION RATE: 18 BRPM | SYSTOLIC BLOOD PRESSURE: 157 MMHG | DIASTOLIC BLOOD PRESSURE: 80 MMHG | HEART RATE: 80 BPM

## 2023-06-01 LAB — GLUCOSE BLDC GLUCOMTR-MCNC: 154 MG/DL — HIGH (ref 70–99)

## 2023-06-01 PROCEDURE — 71046 X-RAY EXAM CHEST 2 VIEWS: CPT | Mod: 26

## 2023-06-01 PROCEDURE — 99239 HOSP IP/OBS DSCHRG MGMT >30: CPT

## 2023-06-01 PROCEDURE — 93010 ELECTROCARDIOGRAM REPORT: CPT

## 2023-06-01 RX ORDER — AMLODIPINE BESYLATE 2.5 MG/1
1 TABLET ORAL
Qty: 0 | Refills: 0 | DISCHARGE

## 2023-06-01 RX ORDER — AMLODIPINE BESYLATE 2.5 MG/1
1 TABLET ORAL
Qty: 0 | Refills: 0 | DISCHARGE
Start: 2023-06-01

## 2023-06-01 RX ORDER — CEPHALEXIN 500 MG
1 CAPSULE ORAL
Qty: 10 | Refills: 0
Start: 2023-06-01 | End: 2023-06-05

## 2023-06-01 RX ADMIN — Medication 81 MILLIGRAM(S): at 11:24

## 2023-06-01 RX ADMIN — Medication 650 MILLIGRAM(S): at 12:30

## 2023-06-01 RX ADMIN — Medication 300 MILLIGRAM(S): at 11:24

## 2023-06-01 RX ADMIN — Medication 650 MILLIGRAM(S): at 11:25

## 2023-06-01 RX ADMIN — LISINOPRIL 40 MILLIGRAM(S): 2.5 TABLET ORAL at 05:14

## 2023-06-01 RX ADMIN — AMLODIPINE BESYLATE 10 MILLIGRAM(S): 2.5 TABLET ORAL at 05:14

## 2023-06-01 RX ADMIN — Medication 650 MILLIGRAM(S): at 05:14

## 2023-06-01 RX ADMIN — Medication 100 MILLIGRAM(S): at 11:25

## 2023-06-01 RX ADMIN — Medication 100 MILLIGRAM(S): at 04:31

## 2023-06-01 RX ADMIN — REPAGLINIDE 2 MILLIGRAM(S): 1 TABLET ORAL at 11:31

## 2023-06-01 NOTE — DISCHARGE NOTE NURSING/CASE MANAGEMENT/SOCIAL WORK - PATIENT PORTAL LINK FT
You can access the FollowMyHealth Patient Portal offered by [facility] by registering at the following website: [email or web address]. By joining [facility]’s FollowMyHealth portal, you will also be able to view your health information using other applications (apps) compatible with our system.

## 2023-06-01 NOTE — PROGRESS NOTE ADULT - SUBJECTIVE AND OBJECTIVE BOX
INTERVAL HPI/OVERNIGHT EVENTS:  Pt is POD #1 DC ICD (MDT) and loop explant. No acute events over night. In NSR 80s, no tele events noted.   Patient denies fever, chills, dizziness, syncope, chest pain, palpitations, SOB, cough, abd pain, n/v/d/c, dysuria, hematuria or unusual rash.     MEDICATIONS  (STANDING):  allopurinol 300 milliGRAM(s) Oral daily  amLODIPine   Tablet 10 milliGRAM(s) Oral daily  aspirin enteric coated 81 milliGRAM(s) Oral daily  ceFAZolin   IVPB 2000 milliGRAM(s) IV Intermittent once  ceFAZolin   IVPB 1000 milliGRAM(s) IV Intermittent once  lisinopril 40 milliGRAM(s) Oral daily  melatonin 3 milliGRAM(s) Oral at bedtime  repaglinide 2 milliGRAM(s) Oral three times a day before meals    MEDICATIONS  (PRN):  acetaminophen     Tablet .. 650 milliGRAM(s) Oral every 6 hours PRN Mild Pain (1 - 3)      Allergies    No Known Allergies    Intolerances        REVIEW OF SYSTEMS    [x] A ten-point review of systems was otherwise negative except as noted.  [ ] Due to altered mental status/intubation, subjective information were not able to be obtained from the patient. History was obtained, to the extent possible, from review of the chart and collateral sources of information.      Vital Signs Last 24 Hrs  T(C): 36.6 (01 Jun 2023 08:36), Max: 36.8 (31 May 2023 20:21)  T(F): 97.9 (01 Jun 2023 08:36), Max: 98.3 (31 May 2023 20:21)  HR: 80 (01 Jun 2023 08:36) (72 - 87)  BP: 157/80 (01 Jun 2023 08:36) (134/71 - 166/88)  BP(mean): 110 (01 Jun 2023 08:36) (98 - 118)  RR: 18 (01 Jun 2023 08:36) (16 - 20)  SpO2: 97% (01 Jun 2023 04:17) (92% - 97%)    Parameters below as of 31 May 2023 17:00  Patient On (Oxygen Delivery Method): room air        05-31-23 @ 07:01  -  06-01-23 @ 07:00  --------------------------------------------------------  IN: 240 mL / OUT: 600 mL / NET: -360 mL    06-01-23 @ 07:01  -  06-01-23 @ 15:18  --------------------------------------------------------  IN: 0 mL / OUT: 400 mL / NET: -400 mL        Physical Exam  GENERAL: In no apparent distress, well nourished, and hydrated.  HEART: Regular rate and rhythm; No murmurs, rubs, or gallops.  PULMONARY: Clear to auscultation and perfusion.  No rales, wheezing, or rhonchi bilaterally.  CHEST: Surgical incision c/d/i, no hematoma or drainage noted  ABDOMEN: Soft, Nontender, Nondistended; Bowel sounds present  EXTREMITIES:  2+ Peripheral Pulses, No clubbing, cyanosis, or edema  NEUROLOGICAL: AO x4 ,HARRY, speech clear    LABS:    05-31-23 @ 07:01  -  06-01-23 @ 07:00  --------------------------------------------------------  IN: 240 mL / OUT: 600 mL / NET: -360 mL    06-01-23 @ 07:01 - 06-01-23 @ 15:18  --------------------------------------------------------  IN: 0 mL / OUT: 400 mL / NET: -400 mL        05-31-23 @ 07:01  -  06-01-23 @ 07:00  --------------------------------------------------------  IN: 240 mL / OUT: 600 mL / NET: -360 mL    06-01-23 @ 07:01  -  06-01-23 @ 15:18  --------------------------------------------------------  IN: 0 mL / OUT: 400 mL / NET: -400 mL        RADIOLOGY:  < from: Transesophageal Echocardiogram (03.07.22 @ 08:30) >  Summary:   1. Normal global left ventricular systolic function.   2. Moderately increased LV wall thickness.   3. Mildly enlarged left atrium.   4. Normal right atrial size.   5. Mild mitral valve regurgitation.   6. Aorta at level of sinuses was 3.7.   7. Color flow doppler and intravenous injection of agitated saline   demonstrates the presence of an intact intra atrial septum.    PHYSICIAN INTERPRETATION:  Left Ventricle: The left ventricular internal cavity size is normal. Left   ventricular wall thickness is moderately increased. Global LV systolic   function was normal.  Right Ventricle: Normal right ventricular size and function.  Left Atrium: Mildly enlarged left atrium. No left atrial appendage   thrombus is seen and normal left atrial appendage velocities. Color flow   doppler and intravenous injection of agitated saline demonstrates the   presence of an intact intra atrial septum.  Right Atrium: Normal right atrial size.  Pericardium: There is no evidence of pericardial effusion.  Mitral Valve: The mitral valve is normal in structure. Mild mitral valve   regurgitation is seen.  Tricuspid Valve: The tricuspid valve is normal in structure. Trivial   tricuspidregurgitation is visualized.  Aortic Valve: The aortic valve is trileaflet. No evidence of aortic   stenosis. Aortic valve thickening with normal leaflet opening. Trivial   aortic valve regurgitation is seen.  Aorta: The aortic root is normal in sizeand structure. Simple atheroma   seen in the descending aorta. Aorta at level of sinuses was 3.7.  Shunts: Agitated saline contrast was given intravenously to evaluate for   intracardiac shunting.    < end of copied text >

## 2023-06-01 NOTE — PROGRESS NOTE ADULT - ASSESSMENT
EP: Carlie    57yr old male with h /o stroke 2022 d/t vertebral artery stenosis - further work up revealed Hypertrophic CM, s/p loop recorder -showed NSVT indicating elective ICD implantation. On 5/31/23, patient underwent successful Medtronic Dual Chamber ICD implantation with explantation of loop recorder. No immediate postop complications noted.     Plan:  - Interrogation complete:  AAI-DDD  bpm  - CXR noted  - EKG   - HOLD any heparin, lovenox or DOACs for 48 hr following procedure to minimize risk of hematoma  - May resume PO diet  - Cont current care  - Tylenol for pain  - Ambulate as tolerated    Discharge Instructions:  - No heavy lifting > 5 lbs. for 4-6 weeks  - Do not raise your arm above shoulder level for 4-6 weeks.   - Do not shower for 5 days, may resume on Monday  - No submerging in water for 1 month  - Leave steri-strips in place, they will fall off on their own  - No driving for 1 week

## 2023-06-05 DIAGNOSIS — I47.20 VENTRICULAR TACHYCARDIA, UNSPECIFIED: ICD-10-CM

## 2023-06-05 DIAGNOSIS — Z86.73 PERSONAL HISTORY OF TRANSIENT ISCHEMIC ATTACK (TIA), AND CEREBRAL INFARCTION WITHOUT RESIDUAL DEFICITS: ICD-10-CM

## 2023-06-05 DIAGNOSIS — Z95.818 PRESENCE OF OTHER CARDIAC IMPLANTS AND GRAFTS: ICD-10-CM

## 2023-06-05 DIAGNOSIS — I42.1 OBSTRUCTIVE HYPERTROPHIC CARDIOMYOPATHY: ICD-10-CM

## 2023-06-05 DIAGNOSIS — I50.9 HEART FAILURE, UNSPECIFIED: ICD-10-CM

## 2023-06-29 ENCOUNTER — APPOINTMENT (OUTPATIENT)
Dept: ELECTROPHYSIOLOGY | Facility: CLINIC | Age: 58
End: 2023-06-29
Payer: COMMERCIAL

## 2023-06-29 ENCOUNTER — APPOINTMENT (OUTPATIENT)
Dept: ELECTROPHYSIOLOGY | Facility: CLINIC | Age: 58
End: 2023-06-29

## 2023-06-29 VITALS
HEART RATE: 76 BPM | DIASTOLIC BLOOD PRESSURE: 80 MMHG | BODY MASS INDEX: 30.8 KG/M2 | WEIGHT: 220 LBS | HEIGHT: 71 IN | TEMPERATURE: 98 F | SYSTOLIC BLOOD PRESSURE: 130 MMHG

## 2023-06-29 DIAGNOSIS — R06.02 SHORTNESS OF BREATH: ICD-10-CM

## 2023-06-29 DIAGNOSIS — Z48.89 ENCOUNTER FOR OTHER SPECIFIED SURGICAL AFTERCARE: ICD-10-CM

## 2023-06-29 PROCEDURE — 93000 ELECTROCARDIOGRAM COMPLETE: CPT | Mod: 59

## 2023-06-29 PROCEDURE — 99024 POSTOP FOLLOW-UP VISIT: CPT

## 2023-06-29 PROCEDURE — 93284 PRGRMG EVAL IMPLANTABLE DFB: CPT

## 2023-06-29 NOTE — PROCEDURE
[No] : not [NSR] : normal sinus rhythm [Longevity: ___ months] : The estimated remaining battery life is [unfilled] months [None] : none [Programmed for Longevity] : output reprogrammed for improved battery longevity [Sensing Amplitude ___mv] : sensing amplitude was [unfilled] mv [Lead Imp:  ___ohms] : lead impedance was [unfilled] ohms [___V @] : [unfilled] V [___ ms] : [unfilled] ms [de-identified] : Medtronic [de-identified] : COBALT XT  [id number] [de-identified] : [id number] [de-identified] : 05/31/2023 [de-identified] : 60/130 [de-identified] : 13 YEARS [de-identified] : 2 NSVT events <2 seconds on 6/2/23\par Transmitting on [facility]. \par

## 2023-06-29 NOTE — PHYSICAL EXAM
[General Appearance - Well Developed] : well developed [Normal Appearance] : normal appearance [General Appearance - Well Nourished] : well nourished [General Appearance - In No Acute Distress] : no acute distress [Heart Rate And Rhythm] : heart rate and rhythm were normal [Heart Sounds] : normal S1 and S2 [] : no respiratory distress [Respiration, Rhythm And Depth] : normal respiratory rhythm and effort [Clean] : clean [Dry] : dry [Bowel Sounds] : normal bowel sounds [Abdomen Soft] : soft [Left Infraclavicular] : left infraclavicular area [Healing Well] : healing well [Bleeding] : no active bleeding [Erythema] : not erythematous [Indurated] : not indurated [FreeTextEntry2] : ILR explant site clean, dry, no redness, no swelling, no drainage

## 2023-06-29 NOTE — CARDIOLOGY SUMMARY
[de-identified] : ECG 06/29/2023: SR, 1st degree AV block   HR 76bpm\par ECG ( 1/6/20230 87bpm sinus rhythm with 1st degree\par ECG ( 7/29/2022) 83bpm sinus rhythm   ms, QRS 106ms\par ECG (4/7/2022): NSR at 79 bpm, \par EKG (3/15/22): SR@80, QRSd 110, \par  [de-identified] : TTE ( 11/19/2022) LVEF 65-70%\par ROMINA (03/22): Nl EF, Mod LVH, Mild LAE, intact IAS [de-identified] : CTA (04/2023): Minimal obstructive coronary disease, ground-glass nodules in the right mid-lobe. Recommend chest CT follow-up in 6-12 months. \par \par MRI (04/2023): Asymmetric hypertrophy involving the basal and midseptal walls, 2.1 cm, and distal left ventricle measuring 1.7 cm, consistent with hypertrophic cardiomyopathy. Extensive patchy myocardial scarring in apical and mid-myocardial aspect of the basal distal septum, lateral wall, and distal apex. Ejection fraction of normal. LV burden 14%. [de-identified] : 3/8/2022 - MDT ILR implant

## 2023-06-29 NOTE — ASSESSMENT
[FreeTextEntry1] : \par # Hypertrophic Cardiomyopathy s/p AICD for primary prevention\par - H/O extensive myocardial scar, confirmed hypertrophic cardiomyopathy on MRI, and non-sustained VT.\par - Incision site healing well, clean, dry, no drainage, no redness, no bruising. I discussed with patient post-operative care in great details. I answered all questions to their satisfaction. Patient was pleased with the result of their procedure. Advised pt to avoid carrying more than 5 lb and lifting his left arm above his shoulder for a total of 6 weeks from procedure date. Also advised pt to avoid fishing, swimming and golfing for a total of 3 mo to avoid lead dislodgment.\par - Device interrogated and reprogrammed as described in procedure. Device function normal. All parameters stable. Optivol optimizing. 2 brief NSVT events. See Device Printout.\par - Continue Metoprolol 25 mg once a day.\par \par # Remote monitoring - patient is enrolled with [employer]\par - Remote monitoring was discussed with patient, schedule, process, as well as associated co-pay that may not be covered by their insurance.\par \par # Hypertension \par - Patient's pressure is well-controlled. \par - Continue Amlodipine 10 mg once a day. \par - Continue Lisinopril 40 mg once a day. \par - low sodium/sat fat diet\par - F/U with cards as needed\par \par RTO 5-6mo / PRN\par \par I have also advised the patient to go to the nearest emergency room if he experiences any chest pain, dyspnea, syncope, or has any other compelling symptoms.\par \par \par \par \par \par \par

## 2023-06-29 NOTE — HISTORY OF PRESENT ILLNESS
[de-identified] : Cardiologist : Dr. Dr. Michelle Oconnell  ( [place])  \par EP: Dr. Sexton\par ___________________________________________________________\par \par Mr. PIKE is a 57 year- old male with history of HTN, DM type 2, DLD, traumatic Right eye blindness. H/o admission to [facility] ED for Left arm weakness. He received TPA. MRI of the head showed small acute infarct in the Right caudate and Eight high precentral gyrus. ROMINA showed no PFO, no Thrombus. ILR was implanted on 3/8/2022 for cryptogenic stroke long term surveillance.\par \par He returns for follow up for routine device interrogation\par \par Denies chest pain, shortness of breath, palpitation, dizziness, presyncope or syncope. Denies recurrent TIA/CVA symptoms.\par \par Patient was recently found to have hypertrophic cardiomyopathy. No family history of sudden cardiac death. No syncope or chest pain at this time.

## 2023-08-14 ENCOUNTER — RX RENEWAL (OUTPATIENT)
Age: 58
End: 2023-08-14

## 2023-10-04 ENCOUNTER — APPOINTMENT (OUTPATIENT)
Dept: CARDIOLOGY | Facility: CLINIC | Age: 58
End: 2023-10-04
Payer: COMMERCIAL

## 2023-10-04 ENCOUNTER — NON-APPOINTMENT (OUTPATIENT)
Age: 58
End: 2023-10-04

## 2023-10-04 PROCEDURE — 93295 DEV INTERROG REMOTE 1/2/MLT: CPT

## 2023-10-04 PROCEDURE — 93296 REM INTERROG EVL PM/IDS: CPT

## 2023-12-08 ENCOUNTER — APPOINTMENT (OUTPATIENT)
Dept: ELECTROPHYSIOLOGY | Facility: CLINIC | Age: 58
End: 2023-12-08
Payer: COMMERCIAL

## 2023-12-08 VITALS
RESPIRATION RATE: 18 BRPM | SYSTOLIC BLOOD PRESSURE: 110 MMHG | BODY MASS INDEX: 26.6 KG/M2 | HEART RATE: 95 BPM | HEIGHT: 71 IN | WEIGHT: 190 LBS | DIASTOLIC BLOOD PRESSURE: 72 MMHG | TEMPERATURE: 97.1 F

## 2023-12-08 DIAGNOSIS — Z45.02 ENCOUNTER FOR ADJUSTMENT AND MANAGEMENT OF AUTOMATIC IMPLANTABLE CARDIAC DEFIBRILLATOR: ICD-10-CM

## 2023-12-08 PROCEDURE — 99213 OFFICE O/P EST LOW 20 MIN: CPT

## 2023-12-08 PROCEDURE — 93290 INTERROG DEV EVAL ICPMS IP: CPT

## 2023-12-08 PROCEDURE — 93283 PRGRMG EVAL IMPLANTABLE DFB: CPT

## 2023-12-08 RX ORDER — PIOGLITAZONE HYDROCHLORIDE 30 MG/1
30 TABLET ORAL DAILY
Refills: 0 | Status: ACTIVE | COMMUNITY

## 2023-12-08 RX ORDER — REPAGLINIDE 2 MG/1
2 TABLET ORAL 3 TIMES DAILY
Refills: 0 | Status: ACTIVE | COMMUNITY

## 2023-12-08 RX ORDER — ALLOPURINOL 300 MG/1
300 TABLET ORAL DAILY
Refills: 0 | Status: ACTIVE | COMMUNITY

## 2023-12-08 RX ORDER — TIRZEPATIDE 7.5 MG/.5ML
7.5 INJECTION, SOLUTION SUBCUTANEOUS
Qty: 1 | Refills: 3 | Status: ACTIVE | COMMUNITY

## 2023-12-08 RX ORDER — LISINOPRIL 40 MG/1
40 TABLET ORAL DAILY
Refills: 0 | Status: ACTIVE | COMMUNITY

## 2023-12-08 RX ORDER — GABAPENTIN 300 MG/1
300 CAPSULE ORAL 3 TIMES DAILY
Refills: 0 | Status: ACTIVE | COMMUNITY

## 2023-12-08 RX ORDER — AMLODIPINE BESYLATE 10 MG/1
10 TABLET ORAL DAILY
Refills: 0 | Status: ACTIVE | COMMUNITY

## 2023-12-08 RX ORDER — ATORVASTATIN CALCIUM 20 MG/1
20 TABLET, FILM COATED ORAL DAILY
Refills: 0 | Status: ACTIVE | COMMUNITY

## 2023-12-21 ENCOUNTER — APPOINTMENT (OUTPATIENT)
Dept: ELECTROPHYSIOLOGY | Facility: CLINIC | Age: 58
End: 2023-12-21
Payer: COMMERCIAL

## 2023-12-21 VITALS
BODY MASS INDEX: 26.6 KG/M2 | SYSTOLIC BLOOD PRESSURE: 76 MMHG | WEIGHT: 190 LBS | DIASTOLIC BLOOD PRESSURE: 52 MMHG | HEIGHT: 71 IN | RESPIRATION RATE: 18 BRPM | TEMPERATURE: 97.1 F | HEART RATE: 153 BPM

## 2023-12-21 DIAGNOSIS — Z45.02 ENCOUNTER FOR ADJUSTMENT AND MANAGEMENT OF AUTOMATIC IMPLANTABLE CARDIAC DEFIBRILLATOR: ICD-10-CM

## 2023-12-21 DIAGNOSIS — I47.29 OTHER VENTRICULAR TACHYCARDIA: ICD-10-CM

## 2023-12-21 PROCEDURE — 93283 PRGRMG EVAL IMPLANTABLE DFB: CPT

## 2023-12-21 PROCEDURE — 93290 INTERROG DEV EVAL ICPMS IP: CPT

## 2023-12-21 PROCEDURE — 99215 OFFICE O/P EST HI 40 MIN: CPT

## 2023-12-21 PROCEDURE — 93000 ELECTROCARDIOGRAM COMPLETE: CPT | Mod: 59

## 2023-12-21 NOTE — CARDIOLOGY SUMMARY
[de-identified] : 12/21/2023: First EKG shows junctional tachycardia 145 bpm                     Second EKG (after ramp therapy) SR 98 bpm ECG 06/29/2023: SR, 1st degree AV block   HR 76bpm ECG ( 1/6/20230 87bpm sinus rhythm with 1st degree ECG ( 7/29/2022) 83bpm sinus rhythm   ms, QRS 106ms ECG (4/7/2022): NSR at 79 bpm,  EKG (3/15/22): SR@80, QRSd 110,   [de-identified] : TTE ( 11/19/2022) LVEF 65-70%\par  ROMINA (03/22): Nl EF, Mod LVH, Mild LAE, intact IAS [de-identified] : CTA (04/2023): Minimal obstructive coronary disease, ground-glass nodules in the right mid-lobe. Recommend chest CT follow-up in 6-12 months. \par  \par  MRI (04/2023): Asymmetric hypertrophy involving the basal and midseptal walls, 2.1 cm, and distal left ventricle measuring 1.7 cm, consistent with hypertrophic cardiomyopathy. Extensive patchy myocardial scarring in apical and mid-myocardial aspect of the basal distal septum, lateral wall, and distal apex. Ejection fraction of normal. LV burden 14%. [de-identified] : 3/8/2022 - MDT ILR implant

## 2023-12-21 NOTE — HISTORY OF PRESENT ILLNESS
Physical Therapy  Facility/Department: [address] STEPDOWN  Physical Therapy Initial Assessment    Name: Iram Camacho  : [birth year]  MRN: [id number]  Date of Service: 2023    Discharge Recommendations: Further therapy recommended at discharge. Chief Complaint   Patient presents with    Incisional Pain     Had open heart sx in November         Iram Camacho is a 76y.o. year old, ,  female known to our staff. Patient had CABG performed by Dr. Christine Huitron on 2022. She last was seen in the office on 2022. At that point she denied any respiratory or cardiac problems. She did have multiple areas of eschar around and including lower sternal incision. No acitve drainage or odor. She was asked to follow up if any changes. She retruned to the ER last night complaining of chest pain and shortness of breath. CT scan of chest revealed large left pleural effusion with near collapse of left lung. Pulmonary and ID have been consulted. We've been asked to evaluate sternum and pleural effusion. I'm seeing the patient in consult with Dr. Christine Huitron. All films and records available have been reviewed. PT Equipment Recommendations  Equipment Needed: No      Patient Diagnosis(es): The primary encounter diagnosis was Pleural effusion on left. A diagnosis of Abscess of sternal region was also pertinent to this visit. Past Medical History:  has a past medical history of Ambulates with cane, Anxiety, Borderline hypertension, CAD (coronary artery disease), Depression, GERD (gastroesophageal reflux disease), Heart attack ([address] Hypertension, Hypothyroidism, Neuropathy, Obesity, Osteoarthritis, Other cirrhosis of liver ([address] Sleep apnea, Type II or unspecified type diabetes mellitus without mention of complication, not stated as uncontrolled, Under care of service provider, Under care of service provider, and Vertebrogenic low back pain.   Past Surgical History:  has a past surgical history that includes Hysterectomy; Colonoscopy; Upper gastrointestinal endoscopy; Dilation and curettage of uterus; hiatal hernia repair; Throat surgery; Appendectomy; Total knee arthroplasty (Right, 01/06/2015); Total knee arthroplasty (Left, 05/26/2015); Coronary angioplasty with stent (07/2020); Cardiac catheterization; Cardiac catheterization (11/01/2022); Coronary artery bypass graft (N/A, 11/28/2022); and IR CHEST TUBE INSERTION (1/13/2023). Assessment   Body Structures, Functions, Activity Limitations Requiring Skilled Therapeutic Intervention: Decreased functional mobility ; Decreased ADL status; Decreased body mechanics; Decreased balance;Decreased endurance;Decreased high-level IADLs;Decreased coordination; Increased pain;Decreased strength  Assessment: Pt ambulates 15 ft with RW and CGA. pt is mod Ax2 for bed mobility. Pt currently is unsafe to return to prior living situation d/t need for skilled assistance with functional mobility. pt would benefit from continued therapy to promote endurance, balance, and strengthening. Therapy Prognosis: Good  Decision Making: Medium Complexity  Barriers to Learning: none  Requires PT Follow-Up: Yes  Activity Tolerance  Activity Tolerance: Patient limited by fatigue;Patient limited by endurance     Plan   Physcial Therapy Plan  General Plan:  (5-6x)  Current Treatment Recommendations: Strengthening, Endurance training, Balance training, Functional mobility training, ADL/Self-care training, Transfer training, IADL training, Neuromuscular re-education, Gait training, Stair training, Home exercise program, Equipment evaluation, education, & procurement, Therapeutic activities, Patient/Caregiver education & training, Safety education & training  Safety Devices  Type of Devices:  All fall risk precautions in place, Call light within reach, Bed alarm in place, Gait belt, Left in bed  Restraints  Restraints Initially in Place: No Restrictions  Restrictions/Precautions  Restrictions/Precautions: Fall Risk  Required Braces or Orthoses?: No  Position Activity Restriction  Sternal Precautions: No Pushing, No Pulling, 5# Lifting Restrictions  Other position/activity restrictions: Pt had recent CABG on 11/28/22, sternal precautions maintained with pillow used on chest during session d/t pt stating she has not yet been cleared of them. Subjective   General  Patient assessed for rehabilitation services?: Yes  Response To Previous Treatment: Not applicable  Family / Caregiver Present: Yes ()  Follows Commands: Within Functional Limits  Subjective  Subjective: RN and pt agreeable to PT. pt agreeable and pleasant. Pt supine in bed at start of session, c/o incisional pain 6/10. Social/Functional History  Social/Functional History  Lives With: Spouse  Type of Home: House  Home Layout: One level  Home Access: Stairs to enter with rails  Entrance Stairs - Number of Steps: 3  Entrance Stairs - Rails: Left  Bathroom Shower/Tub: Walk-in shower  Bathroom Toilet: Standard  Bathroom Equipment: Grab bars in shower, Built-in shower seat, Grab bars around toilet  Home Equipment: Medardo Sorrow, rolling, Oxygen, Rollator (Uses cane for household distances. Uses RW community distances.)  Has the patient had two or more falls in the past year or any fall with injury in the past year?: Yes (2x this week)  Receives Help From: Family  ADL Assistance: Needs assistance ( assists with LB bathing.  Able to complete all other ADLS independent.)  Toileting: Independent  Homemaking Assistance: Needs assistance  Homemaking Responsibilities: No ( completes)  Ambulation Assistance: Independent  Transfer Assistance: Independent  Active : No  Patient's  Info:   Mode of Transportation: [facility]  Occupation: Retired  Type of Occupation: retired  at [address] and swim  [employer] [facility] Comments:  is home almost at all times. Has support to call if  is not home. Vision/Hearing  Vision  Vision: Impaired  Vision Exceptions: Wears glasses for reading  Hearing  Hearing: Exceptions to [facility]  Hearing Exceptions: Hard of hearing/hearing concerns    Cognition   Orientation  Overall Orientation Status: Within Functional Limits  Cognition  Overall Cognitive Status: WFL             Gross Assessment  Sensation: Impaired (pt reports n/t of BLE and hands)     AROM RLE (degrees)  RLE AROM: WFL  AROM LLE (degrees)  LLE AROM : WFL  AROM RUE (degrees)  RUE AROM : WFL  RUE General AROM: within sternal precautions  AROM LUE (degrees)  LUE AROM : WFL  LUE General AROM: within sternal precautions  Strength RLE  Strength RLE: WFL  Strength LLE  Strength LLE: WFL  Strength RUE  Strength RUE: WFL  Comment: within sternal precautions  Strength LUE  Strength LUE: WFL  Comment: within sternal precautions           Bed mobility  Supine to Sit: Moderate assistance;2 Person assistance (for trunk and BLE)  Sit to Supine: 2 Person assistance; Moderate assistance  Scooting: Moderate assistance  Bed Mobility Comments: HOB elevated. Pt required significant assistance during bed mobility d/t maintaining sternal precautions t/o. Transfers  Sit to Stand: Minimal Assistance;Contact guard assistance  Stand to Sit: Minimal Assistance;Contact guard assistance  Comment: Min A for initial transfer, CGA for second transfer. RW used. Pillow on chest used during transfer  Ambulation  Surface: Level tile  Device: Rolling Walker  Other Apparatus: O2 (3 L per NC)  Assistance: Contact guard assistance  Gait Deviations: Slow Kamini;Decreased step length;Decreased step height  Distance: 15ft  Comments: No LOB noted. Distance limited d/t pt on chest tube to suction t/o, and decreased endurance.   More Ambulation?: No  Stairs/Curb  Stairs?: No     Balance  Posture: Good  Sitting - Static: Good  Sitting - Dynamic: Good;-  Standing - Static: Fair  Standing - Dynamic: Fair  Comments: Standing assessed with RW                                                        AM-PAC Score  AM-PAC Inpatient Mobility Raw Score : 16 (01/13/23 1530)  AM-PAC Inpatient T-Scale Score : 40.78 (01/13/23 1530)  Mobility Inpatient CMS 0-100% Score: 54.16 (01/13/23 1530)  Mobility Inpatient CMS G-Code Modifier : CK (01/13/23 1530)            Goals  Short Term Goals  Time Frame for Short Term Goals: 14 visits  Short Term Goal 1: Complete bed mobility with Shannon  Short Term Goal 2: Complete transfers with least restrictive AD and SBA  Short Term Goal 3: Complete 300 ft of gait with least restrictive AD and SBA  Short Term Goal 4: Complete 3 steps with LHR and SBA  Short Term Goal 5: Participate in 30 minutes of therapy to promote endurance       Education  Patient Education  Education Given To: Patient  Education Provided: Role of Therapy;Plan of Care  Education Method: Demonstration  Barriers to Learning: None  Education Outcome: Verbalized understanding;Demonstrated understanding      Therapy Time   Individual Concurrent Group Co-treatment   Time In 1405         Time Out 1443         Minutes 38         Timed Code Treatment Minutes: 2017 Brendan Cain, PT [de-identified] : Cardiologist : Dr. Dr. Michelle Oconnell  ( [place])   EP: Dr. Sexton ___________________________________________________________  Mr. PIKE is a 58 year- old male with history of HTN, DM type 2, DLD, traumatic Right eye blindness. CVA s/p TPA, ILR with multiple NSVT, dx with HOCM s/p DC ICD implant and ILR explant.  He returns for urgent visit due to not feeling well.   Denies chest pain, shortness of breath, palpitation. Complains of dizziness and feeling a "rush" to his head.

## 2023-12-21 NOTE — PHYSICAL EXAM
[General Appearance - Well Developed] : well developed [Normal Appearance] : normal appearance [General Appearance - Well Nourished] : well nourished [General Appearance - In No Acute Distress] : no acute distress [Heart Rate And Rhythm] : heart rate and rhythm were normal [Heart Sounds] : normal S1 and S2 [] : no respiratory distress [Respiration, Rhythm And Depth] : normal respiratory rhythm and effort [Left Infraclavicular] : left infraclavicular area [Clean] : clean [Dry] : dry [Well-Healed] : well-healed [Abdomen Soft] : soft [Nail Clubbing] : no clubbing of the fingernails [Cyanosis, Localized] : no localized cyanosis

## 2023-12-21 NOTE — PROCEDURE
[No] : not [NSR] : normal sinus rhythm [See Device Printout] : See device printout [Longevity: ___ months] : The estimated remaining battery life is [unfilled] months [Lead Imp:  ___ohms] : lead impedance was [unfilled] ohms [Sensing Amplitude ___mv] : sensing amplitude was [unfilled] mv [___V @] : [unfilled] V [___ ms] : [unfilled] ms [None] : none [Programmed for Longevity] : output reprogrammed for improved battery longevity [de-identified] : Medtronic [de-identified] : COBALT XT  [id number] [de-identified] : [id number] [de-identified] : 05/31/2023 [de-identified] : 60/130 [de-identified] : 12.5 YEARS [de-identified] : VT monitor zone added 146 bpm, detection interval lowered from 188 bpm to 176 bpm [de-identified] : No events Ramp therapy for SVT succcessful (see print out)

## 2024-02-20 ENCOUNTER — APPOINTMENT (OUTPATIENT)
Dept: ELECTROPHYSIOLOGY | Facility: CLINIC | Age: 59
End: 2024-02-20
Payer: COMMERCIAL

## 2024-02-20 VITALS
SYSTOLIC BLOOD PRESSURE: 151 MMHG | BODY MASS INDEX: 29.54 KG/M2 | HEIGHT: 71 IN | HEART RATE: 103 BPM | TEMPERATURE: 98 F | DIASTOLIC BLOOD PRESSURE: 84 MMHG | WEIGHT: 211 LBS

## 2024-02-20 PROCEDURE — 93283 PRGRMG EVAL IMPLANTABLE DFB: CPT

## 2024-02-20 PROCEDURE — 99214 OFFICE O/P EST MOD 30 MIN: CPT | Mod: 25

## 2024-02-20 PROCEDURE — 93290 INTERROG DEV EVAL ICPMS IP: CPT

## 2024-02-20 RX ORDER — METOPROLOL SUCCINATE 50 MG/1
50 TABLET, EXTENDED RELEASE ORAL DAILY
Qty: 90 | Refills: 3 | Status: ACTIVE | COMMUNITY
Start: 2023-04-07 | End: 1900-01-01

## 2024-03-08 ENCOUNTER — APPOINTMENT (OUTPATIENT)
Dept: ELECTROPHYSIOLOGY | Facility: CLINIC | Age: 59
End: 2024-03-08
Payer: COMMERCIAL

## 2024-03-08 VITALS
BODY MASS INDEX: 28.7 KG/M2 | HEART RATE: 88 BPM | HEIGHT: 71 IN | DIASTOLIC BLOOD PRESSURE: 76 MMHG | SYSTOLIC BLOOD PRESSURE: 134 MMHG | WEIGHT: 205 LBS | TEMPERATURE: 98 F

## 2024-03-08 DIAGNOSIS — I10 ESSENTIAL (PRIMARY) HYPERTENSION: ICD-10-CM

## 2024-03-08 DIAGNOSIS — I47.20 VENTRICULAR TACHYCARDIA, UNSPECIFIED: ICD-10-CM

## 2024-03-08 DIAGNOSIS — I47.10 SUPRAVENTRICULAR TACHYCARDIA, UNSPECIFIED: ICD-10-CM

## 2024-03-08 DIAGNOSIS — I42.2 OTHER HYPERTROPHIC CARDIOMYOPATHY: ICD-10-CM

## 2024-03-08 PROCEDURE — 99214 OFFICE O/P EST MOD 30 MIN: CPT | Mod: 25

## 2024-03-08 PROCEDURE — 93290 INTERROG DEV EVAL ICPMS IP: CPT

## 2024-03-08 PROCEDURE — 93283 PRGRMG EVAL IMPLANTABLE DFB: CPT

## 2024-03-09 NOTE — CARDIOLOGY SUMMARY
[de-identified] : \par  ECG ( 1/6/20230 87bpm sinus rhythm with 1st degree\par  ECG ( 7/29/2022) 83bpm sinus rhythm   ms, QRS 106ms\par  ECG (4/7/2022): NSR at 79 bpm, \par  EKG (3/15/22): SR@80, QRSd 110, \par   [de-identified] : TTE ( 11/19/2022) LVEF 65-70%\par  ROMINA (03/22): Nl EF, Mod LVH, Mild LAE, intact IAS [de-identified] : CTA (04/2023): Minimal obstructive coronary disease, ground-glass nodules in the right mid-lobe. Recommend chest CT follow-up in 6-12 months. [de-identified] : 3/8/2022 - MDT ILR implant

## 2024-03-09 NOTE — HISTORY OF PRESENT ILLNESS
[de-identified] : Mr. PIKE is a 57 year- old male with history of HTN, DM type 2, DLD, traumatic Right eye blindness. admitted to [facility] thru ED for Left arm weakness. He received TPA. MRI of the head showed small acute infarct in the Right caudate and Eight high precentral gyrus. ROMINA showed no PFO, no Thrombus. ILR was implanted on 3/8/2022 for cryptogenic stroke long term surveillance.  He returns for follow up for routine device interrogation  Denies chest pain, shortness of breath, palpitation, dizziness, presyncope or syncope. Denies recurrent TIA/CVA symptoms.  Patient was recently found to have hypertrophic cardiomyopathy. No family history of sudden cardiac death. No syncope or chest pain at this time.   Patient comes to my office for follow-up after last visit where SVT was successfully terminated by atrial pacing. Since his last visit, patient also had one episode of VT that was successfully terminated by ATP. Patient was asymptomatic at that time and patient was in bed. The episode occurred on January 10, 2024, at 10:00 PM.      MRI (04/2023): Asymmetric hypertrophy involving the basal and midseptal walls, 2.1 cm, and distal left ventricle measuring 1.7 cm, consistent with hypertrophic cardiomyopathy. Extensive patchy myocardial scarring in apical and mid-myocardial aspect of the basal distal septum, lateral wall, and distal apex. Ejection fraction of normal. LV burden 14%. CTA (04/2023): Minimal obstructive coronary disease, ground-glass nodules in the right mid-lobe. Recommend chest CT follow-up in 6-12 months.   Cardiologist : Dr. Dr. Michelle Oconnell  ( [place])

## 2024-03-09 NOTE — ASSESSMENT
[FreeTextEntry1] : Hypertrophic Cardiomyopathy  - Patient is doing well. However, he had one episode of VT.    - Increased Metoprolol to 100 mg once a day.      Hypertension  - Patient's pressure is well-controlled.  - Continue Amlodipine 10 mg once a day.  - Continue Lisinopril 40 mg once a day.    SVT  - Patient had SVT terminated by atrial pacing. No further episodes at this time.    - I discussed with patient the possibility of SVT ablation. I explained the risks and benefits. Patient would like to think about it.       VT  - Patient had the first episode of VT that was successfully terminated by ATP.  - Increased Metoprolol to 100 mg once a day.   - Will continue to monitor.

## 2024-03-09 NOTE — PROCEDURE
[No] : not [NSR] : normal sinus rhythm [See Device Printout] : See device printout [Longevity: ___ months] : The estimated remaining battery life is [unfilled] months [Threshold Testing Performed] : Threshold testing was performed [Atrial] : Atrial [Ventricular] : Ventricular [Lead Imp:  ___ohms] : lead impedance was [unfilled] ohms [Sensing Amplitude ___mv] : sensing amplitude was [unfilled] mv [___V @] : [unfilled] V [___ ms] : [unfilled] ms [None] : none [Programmed for Longevity] : output reprogrammed for improved battery longevity [de-identified] : Medtronic [de-identified] : COBALT XT  [id number] [de-identified] : [id number] [de-identified] : 60/130 [de-identified] : 05/31/2023 [de-identified] : 12.5 YEARS [de-identified] : 1 ATP treated VT 1/10/24 14 NSVT episodes, longest episode 5 seconds

## 2024-03-09 NOTE — PHYSICAL EXAM
[General Appearance - Well Developed] : well developed [Normal Appearance] : normal appearance [General Appearance - Well Nourished] : well nourished [General Appearance - In No Acute Distress] : no acute distress [Heart Rate And Rhythm] : heart rate and rhythm were normal [Heart Sounds] : normal S1 and S2 [Respiration, Rhythm And Depth] : normal respiratory rhythm and effort [Clean] : clean [Dry] : dry [Well-Healed] : well-healed [Bowel Sounds] : normal bowel sounds [Abdomen Soft] : soft [Nail Clubbing] : no clubbing of the fingernails [Cyanosis, Localized] : no localized cyanosis [Petechial Hemorrhages (___cm)] : no petechial hemorrhages [] : no ischemic changes [Healing Well] : healing poorly [Bleeding] : no active bleeding [Indurated] : not indurated [Erythema] : not erythematous [FreeTextEntry1] : left para sternal

## 2024-03-09 NOTE — ADDENDUM
[FreeTextEntry1] : Ariana HAWKINS assisted in documentation on 02/20/2024   acting as a scribe for Dr. Andrzej Sexton.

## 2024-04-27 NOTE — CARDIOLOGY SUMMARY
[de-identified] : \par  ECG ( 1/6/20230 87bpm sinus rhythm with 1st degree\par  ECG ( 7/29/2022) 83bpm sinus rhythm   ms, QRS 106ms\par  ECG (4/7/2022): NSR at 79 bpm, \par  EKG (3/15/22): SR@80, QRSd 110, \par   [de-identified] : TTE ( 11/19/2022) LVEF 65-70%\par  ROMINA (03/22): Nl EF, Mod LVH, Mild LAE, intact IAS [de-identified] : CTA (04/2023): Minimal obstructive coronary disease, ground-glass nodules in the right mid-lobe. Recommend chest CT follow-up in 6-12 months. [de-identified] : 3/8/2022 - MDT ILR implant

## 2024-04-27 NOTE — PHYSICAL EXAM
[General Appearance - Well Developed] : well developed [General Appearance - Well Nourished] : well nourished [Normal Appearance] : normal appearance [General Appearance - In No Acute Distress] : no acute distress [Heart Rate And Rhythm] : heart rate and rhythm were normal [Heart Sounds] : normal S1 and S2 [Respiration, Rhythm And Depth] : normal respiratory rhythm and effort [Clean] : clean [Dry] : dry [Well-Healed] : well-healed [Bowel Sounds] : normal bowel sounds [Abdomen Soft] : soft [Cyanosis, Localized] : no localized cyanosis [Nail Clubbing] : no clubbing of the fingernails [] : no ischemic changes [Petechial Hemorrhages (___cm)] : no petechial hemorrhages [Healing Well] : healing poorly [Bleeding] : no active bleeding [Erythema] : not erythematous [Indurated] : not indurated [FreeTextEntry1] : left para sternal

## 2024-04-27 NOTE — ADDENDUM
[FreeTextEntry1] : Ariana HAWKINS assisted in documentation on 03/08/2024   acting as a scribe for Dr. Andrzej Sexton.

## 2024-04-27 NOTE — ASSESSMENT
[FreeTextEntry1] : Hypertrophic Cardiomyopathy  - Patient is doing well. However, he had one episode of VT.    - Increased Metoprolol to 100 mg once a day.      Hypertension  - Patient's pressure is well-controlled.  - Continue Amlodipine 10 mg once a day.  - Continue Lisinopril 40 mg once a day.    SVT  - Patient had SVT terminated by atrial pacing. No further episodes at this time.    - I discussed with patient the possibility of SVT ablation.  - I have discussed with patient the possibility of ablation again. Patient decided to hold off for now and continue to monitor.     VT  - Patient had the first episode of VT that was successfully terminated by ATP.  - Increased Metoprolol to 100 mg once a day.   - Will continue to monitor.

## 2024-04-27 NOTE — HISTORY OF PRESENT ILLNESS
[de-identified] : Mr. PIKE is a 57 year- old male with history of HTN, DM type 2, DLD, HCM, VT trminated by ATP, traumatic Right eye blindness. [facility]. He received TPA. MRI of the head showed small acute infarct in the Right caudate and Eight high precentral gyrus. ROMINA showed no PFO, no Thrombus. ILR was implanted on 3/8/2022 for cryptogenic stroke long term surveillance.  He returns for follow up for routine device interrogation  Denies chest pain, shortness of breath, palpitation, dizziness, presyncope or syncope. Denies recurrent TIA/CVA symptoms.  Patient was recently found to have hypertrophic cardiomyopathy. No family history of sudden cardiac death. No syncope or chest pain at this time.   Patient comes to my office for follow-up after last visit where SVT was successfully terminated by atrial pacing. Since his last visit, patient also had one episode of VT that was successfully terminated by ATP. Patient was asymptomatic at that time and patient was in bed. The episode occurred on January 10, 2024, at 10:00 PM.     Patient comes to the office for routine follow-up and further discussion about SVT ablation.    MRI (04/2023): Asymmetric hypertrophy involving the basal and midseptal walls, 2.1 cm, and distal left ventricle measuring 1.7 cm, consistent with hypertrophic cardiomyopathy. Extensive patchy myocardial scarring in apical and mid-myocardial aspect of the basal distal septum, lateral wall, and distal apex. Ejection fraction of normal. LV burden 14%. CTA (04/2023): Minimal obstructive coronary disease, ground-glass nodules in the right mid-lobe. Recommend chest CT follow-up in 6-12 months.   Cardiologist : Dr. Dr. Michelle Oconnell  ( [place])

## 2024-04-27 NOTE — PROCEDURE
[No] : not [NSR] : normal sinus rhythm [See Scanned Paceart Report] : See scanned paceart report [See Device Printout] : See device printout [Longevity: ___ months] : The estimated remaining battery life is [unfilled] months [Threshold Testing Performed] : Threshold testing was performed [Atrial] : Atrial [Ventricular] : Ventricular [___V @] : [unfilled] V [Programmed for Longevity] : output reprogrammed for improved battery longevity [de-identified] : Medtronic [de-identified] : COBALT XT  [id number] [de-identified] : [id number] [de-identified] : 05/31/2023 [de-identified] : 60/130 [de-identified] : 12.5 YEARS friend

## 2024-06-09 ENCOUNTER — NON-APPOINTMENT (OUTPATIENT)
Age: 59
End: 2024-06-09

## 2024-06-10 ENCOUNTER — APPOINTMENT (OUTPATIENT)
Dept: CARDIOLOGY | Facility: CLINIC | Age: 59
End: 2024-06-10
Payer: COMMERCIAL

## 2024-06-10 PROCEDURE — 93295 DEV INTERROG REMOTE 1/2/MLT: CPT

## 2024-06-10 PROCEDURE — 93296 REM INTERROG EVL PM/IDS: CPT

## 2024-09-11 ENCOUNTER — NON-APPOINTMENT (OUTPATIENT)
Age: 59
End: 2024-09-11

## 2024-09-11 ENCOUNTER — APPOINTMENT (OUTPATIENT)
Dept: ELECTROPHYSIOLOGY | Facility: CLINIC | Age: 59
End: 2024-09-11
Payer: COMMERCIAL

## 2024-09-11 VITALS
SYSTOLIC BLOOD PRESSURE: 170 MMHG | HEART RATE: 87 BPM | DIASTOLIC BLOOD PRESSURE: 85 MMHG | HEIGHT: 71 IN | TEMPERATURE: 98 F | BODY MASS INDEX: 29.96 KG/M2 | WEIGHT: 214 LBS

## 2024-09-11 DIAGNOSIS — I47.20 VENTRICULAR TACHYCARDIA, UNSPECIFIED: ICD-10-CM

## 2024-09-11 DIAGNOSIS — Z45.02 ENCOUNTER FOR ADJUSTMENT AND MANAGEMENT OF AUTOMATIC IMPLANTABLE CARDIAC DEFIBRILLATOR: ICD-10-CM

## 2024-09-11 DIAGNOSIS — I42.2 OTHER HYPERTROPHIC CARDIOMYOPATHY: ICD-10-CM

## 2024-09-11 DIAGNOSIS — I47.29 OTHER VENTRICULAR TACHYCARDIA: ICD-10-CM

## 2024-09-11 DIAGNOSIS — I10 ESSENTIAL (PRIMARY) HYPERTENSION: ICD-10-CM

## 2024-09-11 DIAGNOSIS — Z45.09 ENCOUNTER FOR ADJUSTMENT AND MANAGEMENT OF OTHER CARDIAC DEVICE: ICD-10-CM

## 2024-09-11 DIAGNOSIS — I47.10 SUPRAVENTRICULAR TACHYCARDIA, UNSPECIFIED: ICD-10-CM

## 2024-09-11 PROCEDURE — 93283 PRGRMG EVAL IMPLANTABLE DFB: CPT

## 2024-09-11 PROCEDURE — 93290 INTERROG DEV EVAL ICPMS IP: CPT

## 2024-09-11 PROCEDURE — 99214 OFFICE O/P EST MOD 30 MIN: CPT

## 2024-09-12 PROBLEM — Z45.09 ENCOUNTER FOR INTERROGATION OF CARDIAC RECORDER: Status: RESOLVED | Noted: 2022-08-03 | Resolved: 2024-09-12

## 2024-09-12 NOTE — ASSESSMENT
[FreeTextEntry1] : Hypertrophic Cardiomyopathy  - Brief episodes of NSVT. No ATP or shocks.  - Continue Metoprolol 100 mg? once a day.  Patient unsure of how much he is taking. i asked him to call me to verify.   Hypertension  - Patient's pressure is elevated. Will reconcile meds as he does not remember all his medications right now.  - Continue Amlodipine 10 mg once a day.  - Continue Lisinopril 40 mg once a day.   SVT  - Patient had SVT terminated by atrial pacing. No further episodes at this time.    - Ablation previously discussed. Will continue to monitor.    VT  - No sustained episodes.  - Continue Metoprolol.   - Consider VT ablation if episodes persist / lengthen.   Given patient copy of cardiac CT angio from last year. He never followed with pulm regarding abnormality. Advised to give copy to his PCP for f/u with chest CT.  RTO in 4-6 months

## 2024-09-12 NOTE — PHYSICAL EXAM
[General Appearance - Well Developed] : well developed [Normal Appearance] : normal appearance [General Appearance - Well Nourished] : well nourished [General Appearance - In No Acute Distress] : no acute distress [Heart Rate And Rhythm] : heart rate and rhythm were normal [Heart Sounds] : normal S1 and S2 [Respiration, Rhythm And Depth] : normal respiratory rhythm and effort [Clean] : clean [Dry] : dry [Well-Healed] : well-healed [Bowel Sounds] : normal bowel sounds [Abdomen Soft] : soft [Nail Clubbing] : no clubbing of the fingernails [Cyanosis, Localized] : no localized cyanosis [Petechial Hemorrhages (___cm)] : no petechial hemorrhages [] : no ischemic changes [Healing Well] : healing poorly [Bleeding] : no active bleeding [Erythema] : not erythematous [Indurated] : not indurated [FreeTextEntry1] : left para sternal

## 2024-09-12 NOTE — HISTORY OF PRESENT ILLNESS
[de-identified] : PCP: Dr. Camila Rolle ([facility] [address])  Mr. PIKE is a 58 year- old male with history of HTN, DM type 2, DLD, HCM, VT trminated by ATP, traumatic Right eye blindness. [facility]. He received TPA. MRI of the head showed small acute infarct in the Right caudate and Eight high precentral gyrus. ROMINA showed no PFO, no Thrombus. ILR was implanted on 3/8/2022 for cryptogenic stroke long term surveillance.  ILR showed many episodes of NSVT. Cardiac MRI revealed hypertrophic cardiomyopathy.  No family history of sudden cardiac death.   Since ICD implant, patient has had SVT terminated by atrial pacing and has had one episode of VT that was successfully terminated by ATP. Patient was asymptomatic at that time and patient was in bed. The episode occurred on January 10, 2024, at 10:00 PM.     Patient comes to the office for routine follow-up. SVT ablation has been discussed but he would like to hold off.  The patient is feeling well and has no cardiac complaints. Denies CP, palpitations, SOB, dizziness, GUERRERO, PND, syncope.   Of note, patient going for EGD this month but not sure why. He denies bleeding but thinks that there may have been something abnormal in his blood work.   MRI (04/2023): Asymmetric hypertrophy involving the basal and midseptal walls, 2.1 cm, and distal left ventricle measuring 1.7 cm, consistent with hypertrophic cardiomyopathy. Extensive patchy myocardial scarring in apical and mid-myocardial aspect of the basal distal septum, lateral wall, and distal apex. Ejection fraction of normal. LV burden 14%. CTA (04/2023): Minimal obstructive coronary disease, ground-glass nodules in the right mid-lobe. Recommend chest CT follow-up in 6-12 months.   Cardiologist : Dr. Dr. Michelle Oconnell  ( [place])

## 2024-09-12 NOTE — CARDIOLOGY SUMMARY
[de-identified] : \par  ECG ( 1/6/20230 87bpm sinus rhythm with 1st degree\par  ECG ( 7/29/2022) 83bpm sinus rhythm   ms, QRS 106ms\par  ECG (4/7/2022): NSR at 79 bpm, \par  EKG (3/15/22): SR@80, QRSd 110, \par   [de-identified] : TTE ( 11/19/2022) LVEF 65-70%\par  ROMINA (03/22): Nl EF, Mod LVH, Mild LAE, intact IAS [de-identified] : CTA (04/2023): Minimal obstructive coronary disease, ground-glass nodules in the right mid-lobe. Recommend chest CT follow-up in 6-12 months. [de-identified] : 3/8/2022 - MDT ILR implant

## 2024-09-12 NOTE — PROCEDURE
[No] : not [NSR] : normal sinus rhythm [See Scanned Paceart Report] : See scanned paceart report [See Device Printout] : See device printout [Longevity: ___ months] : The estimated remaining battery life is [unfilled] months [Threshold Testing Performed] : Threshold testing was performed [Atrial] : Atrial [Ventricular] : Ventricular [Programmed for Longevity] : output reprogrammed for improved battery longevity [ICD] : Implantable cardioverter-defibrillator [Voltage: ___ volts] : Voltage was [unfilled] volts [Charge Time: ___ sec] : charge time was [unfilled] seconds [Lead Imp:  ___ohms] : lead impedance was [unfilled] ohms [Sensing Amplitude ___mv] : sensing amplitude was [unfilled] mv [___V @] : [unfilled] V [___ ms] : [unfilled] ms [de-identified] : Medtronic [de-identified] : COBALT XT  NAVB1Q2 [de-identified] : [id number] [de-identified] : 05/31/2023 [de-identified] : ALIZE [de-identified] : 60/130 [de-identified] : 11.8 YEARS [de-identified] : 27 NSVT episodes, longest ~5 seconds@ 188bpm, the rest 1-4 seconds. Fastest 214 bpm AP-0.3 -0 Optivol below threshold

## 2024-09-12 NOTE — HISTORY OF PRESENT ILLNESS
[de-identified] : PCP: Dr. Camila Rolle ([facility] [address])  Mr. PIKE is a 58 year- old male with history of HTN, DM type 2, DLD, HCM, VT trminated by ATP, traumatic Right eye blindness. [facility]. He received TPA. MRI of the head showed small acute infarct in the Right caudate and Eight high precentral gyrus. ROMINA showed no PFO, no Thrombus. ILR was implanted on 3/8/2022 for cryptogenic stroke long term surveillance.  ILR showed many episodes of NSVT. Cardiac MRI revealed hypertrophic cardiomyopathy.  No family history of sudden cardiac death.   Since ICD implant, patient has had SVT terminated by atrial pacing and has had one episode of VT that was successfully terminated by ATP. Patient was asymptomatic at that time and patient was in bed. The episode occurred on January 10, 2024, at 10:00 PM.     Patient comes to the office for routine follow-up. SVT ablation has been discussed but he would like to hold off.  The patient is feeling well and has no cardiac complaints. Denies CP, palpitations, SOB, dizziness, GUERRERO, PND, syncope.   Of note, patient going for EGD this month but not sure why. He denies bleeding but thinks that there may have been something abnormal in his blood work.   MRI (04/2023): Asymmetric hypertrophy involving the basal and midseptal walls, 2.1 cm, and distal left ventricle measuring 1.7 cm, consistent with hypertrophic cardiomyopathy. Extensive patchy myocardial scarring in apical and mid-myocardial aspect of the basal distal septum, lateral wall, and distal apex. Ejection fraction of normal. LV burden 14%. CTA (04/2023): Minimal obstructive coronary disease, ground-glass nodules in the right mid-lobe. Recommend chest CT follow-up in 6-12 months.   Cardiologist : Dr. Dr. Michelle Oconnell  ( [place])

## 2024-09-12 NOTE — ASSESSMENT
Anesthesia Evaluation     no history of anesthetic complications:  NPO Solid Status: > 8 hours  NPO Liquid Status: > 8 hours           Airway   Mallampati: I  TM distance: >3 FB  Neck ROM: full  No difficulty expected  Dental - normal exam     Pulmonary - normal exam   Cardiovascular - normal exam    (+) valvular problems/murmurs murmur,       Neuro/Psych  GI/Hepatic/Renal/Endo      Musculoskeletal     Abdominal  - normal exam    Bowel sounds: normal.   Substance History      OB/GYN          Other                        Anesthesia Plan    ASA 1     general     inhalational induction   Anesthetic plan, all risks, benefits, and alternatives have been provided, discussed and informed consent has been obtained with: mother.       [FreeTextEntry1] : Hypertrophic Cardiomyopathy  - Brief episodes of NSVT. No ATP or shocks.  - Continue Metoprolol 100 mg? once a day.  Patient unsure of how much he is taking. i asked him to call me to verify.   Hypertension  - Patient's pressure is elevated. Will reconcile meds as he does not remember all his medications right now.  - Continue Amlodipine 10 mg once a day.  - Continue Lisinopril 40 mg once a day.   SVT  - Patient had SVT terminated by atrial pacing. No further episodes at this time.    - Ablation previously discussed. Will continue to monitor.    VT  - No sustained episodes.  - Continue Metoprolol.   - Consider VT ablation if episodes persist / lengthen.   Given patient copy of cardiac CT angio from last year. He never followed with pulm regarding abnormality. Advised to give copy to his PCP for f/u with chest CT.  RTO in 4-6 months

## 2024-09-12 NOTE — CARDIOLOGY SUMMARY
[de-identified] : \par  ECG ( 1/6/20230 87bpm sinus rhythm with 1st degree\par  ECG ( 7/29/2022) 83bpm sinus rhythm   ms, QRS 106ms\par  ECG (4/7/2022): NSR at 79 bpm, \par  EKG (3/15/22): SR@80, QRSd 110, \par   [de-identified] : TTE ( 11/19/2022) LVEF 65-70%\par  ROMINA (03/22): Nl EF, Mod LVH, Mild LAE, intact IAS [de-identified] : CTA (04/2023): Minimal obstructive coronary disease, ground-glass nodules in the right mid-lobe. Recommend chest CT follow-up in 6-12 months. [de-identified] : 3/8/2022 - MDT ILR implant

## 2024-11-15 NOTE — END OF VISIT
Diabetes: pre-diabetes is undetermined controlled. A1C: Per ADA Standards of Medical Care in diabetes 2017 goal is <6.5%. At Goal?: Unknown, awaiting results. Lipid Control: Goal LDL under <100. At Goal?: Yes. Medication changes: NO change, see med list above. Hypoglycemic unawareness: N/A. Microalbumin: N/A.   Maintenance and Self Management:  Self management goals: continued.  Does the patient display any deficiencies regarding self management: No  Patient's readiness to change: (Preparation) prepared to experiment with small changes.  Barriers: no barriers apparent at this time .  Patient has knowledge deficit regarding: None.  Goals set this visit: Topics reviewed with patient: low cholesterol diet, weight control and daily exercise discussed, diabetic Sick Day rules reviewed, handout given, foot care discussed and Podiatry visits discussed, annual eye examinations at Ophthalmology discussed, glycohemoglobin and other lab monitoring discussed, long term diabetic complications discussed, and labs immediately prior to next visit.  Eye Exam up to date: No.  Foot Exam up to date:  N/A.  Orders:    Glycohemoglobin; Future     [Time Spent: ___ minutes] : I have spent [unfilled] minutes of time on the encounter.

## 2024-12-11 ENCOUNTER — APPOINTMENT (OUTPATIENT)
Dept: CARDIOLOGY | Facility: CLINIC | Age: 59
End: 2024-12-11
Payer: COMMERCIAL

## 2024-12-11 ENCOUNTER — NON-APPOINTMENT (OUTPATIENT)
Age: 59
End: 2024-12-11

## 2024-12-11 PROCEDURE — 93295 DEV INTERROG REMOTE 1/2/MLT: CPT

## 2024-12-11 PROCEDURE — 93296 REM INTERROG EVL PM/IDS: CPT

## 2025-03-07 ENCOUNTER — APPOINTMENT (OUTPATIENT)
Dept: ELECTROPHYSIOLOGY | Facility: CLINIC | Age: 60
End: 2025-03-07
Payer: COMMERCIAL

## 2025-03-07 VITALS
HEIGHT: 71 IN | SYSTOLIC BLOOD PRESSURE: 145 MMHG | TEMPERATURE: 98 F | DIASTOLIC BLOOD PRESSURE: 83 MMHG | BODY MASS INDEX: 26.6 KG/M2 | HEART RATE: 87 BPM | WEIGHT: 190 LBS

## 2025-03-07 DIAGNOSIS — I47.29 OTHER VENTRICULAR TACHYCARDIA: ICD-10-CM

## 2025-03-07 DIAGNOSIS — I47.10 SUPRAVENTRICULAR TACHYCARDIA, UNSPECIFIED: ICD-10-CM

## 2025-03-07 DIAGNOSIS — I63.213 CEREBRAL INFARCTION DUE TO UNSPECIFIED OCCLUSION OR STENOSIS OF BILATERAL VERTEBRAL ARTERIES: ICD-10-CM

## 2025-03-07 DIAGNOSIS — I47.20 VENTRICULAR TACHYCARDIA, UNSPECIFIED: ICD-10-CM

## 2025-03-07 DIAGNOSIS — Z45.02 ENCOUNTER FOR ADJUSTMENT AND MANAGEMENT OF AUTOMATIC IMPLANTABLE CARDIAC DEFIBRILLATOR: ICD-10-CM

## 2025-03-07 DIAGNOSIS — I42.2 OTHER HYPERTROPHIC CARDIOMYOPATHY: ICD-10-CM

## 2025-03-07 PROCEDURE — 99214 OFFICE O/P EST MOD 30 MIN: CPT | Mod: 25

## 2025-03-07 PROCEDURE — 93283 PRGRMG EVAL IMPLANTABLE DFB: CPT

## 2025-04-16 ENCOUNTER — APPOINTMENT (OUTPATIENT)
Dept: CARDIOLOGY | Facility: CLINIC | Age: 60
End: 2025-04-16

## 2025-04-17 ENCOUNTER — RX RENEWAL (OUTPATIENT)
Age: 60
End: 2025-04-17

## 2025-06-04 NOTE — PATIENT PROFILE ADULT - TRANSPORTATION
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 Procedure performed without complication.   -R/B/A discussed, consent form signed and valid through 3/11/26    Operculated retinal tear of left eyeH33.312  Operculated retinal tear of right eyeH33.311  - s/p laser retinopexy OS 12/24/2021   - doing well, tear well sealed with laser and retina attached  - Also with incidental operculated break 10:30 OD, asymptomatic and with some pigment surrounding, no treatment indicated    [facility] retina clinic  2 month for possible IVV OS    
no

## 2025-06-06 ENCOUNTER — APPOINTMENT (OUTPATIENT)
Dept: CARDIOLOGY | Facility: CLINIC | Age: 60
End: 2025-06-06
Payer: COMMERCIAL

## 2025-06-06 ENCOUNTER — NON-APPOINTMENT (OUTPATIENT)
Age: 60
End: 2025-06-06

## 2025-06-06 PROCEDURE — 93295 DEV INTERROG REMOTE 1/2/MLT: CPT

## 2025-06-06 PROCEDURE — 93296 REM INTERROG EVL PM/IDS: CPT

## 2025-06-16 ENCOUNTER — NON-APPOINTMENT (OUTPATIENT)
Age: 60
End: 2025-06-16

## 2025-06-16 ENCOUNTER — APPOINTMENT (OUTPATIENT)
Dept: CARDIOLOGY | Facility: CLINIC | Age: 60
End: 2025-06-16
Payer: COMMERCIAL

## 2025-06-16 VITALS — HEIGHT: 71 IN | BODY MASS INDEX: 27.58 KG/M2 | WEIGHT: 197 LBS

## 2025-06-16 VITALS — SYSTOLIC BLOOD PRESSURE: 124 MMHG | DIASTOLIC BLOOD PRESSURE: 79 MMHG | HEART RATE: 89 BPM

## 2025-06-16 PROCEDURE — 99214 OFFICE O/P EST MOD 30 MIN: CPT | Mod: 25

## 2025-06-16 PROCEDURE — 93000 ELECTROCARDIOGRAM COMPLETE: CPT

## 2025-07-01 ENCOUNTER — APPOINTMENT (OUTPATIENT)
Dept: CARDIOLOGY | Facility: CLINIC | Age: 60
End: 2025-07-01
Payer: COMMERCIAL

## 2025-07-01 PROCEDURE — 93306 TTE W/DOPPLER COMPLETE: CPT

## 2025-09-19 ENCOUNTER — APPOINTMENT (OUTPATIENT)
Dept: ELECTROPHYSIOLOGY | Facility: CLINIC | Age: 60
End: 2025-09-19
Payer: COMMERCIAL

## 2025-09-19 VITALS
HEIGHT: 71 IN | WEIGHT: 190 LBS | HEART RATE: 91 BPM | SYSTOLIC BLOOD PRESSURE: 106 MMHG | DIASTOLIC BLOOD PRESSURE: 61 MMHG | BODY MASS INDEX: 26.6 KG/M2

## 2025-09-19 DIAGNOSIS — I42.2 OTHER HYPERTROPHIC CARDIOMYOPATHY: ICD-10-CM

## 2025-09-19 DIAGNOSIS — I47.10 SUPRAVENTRICULAR TACHYCARDIA, UNSPECIFIED: ICD-10-CM

## 2025-09-19 DIAGNOSIS — Z45.02 ENCOUNTER FOR ADJUSTMENT AND MANAGEMENT OF AUTOMATIC IMPLANTABLE CARDIAC DEFIBRILLATOR: ICD-10-CM

## 2025-09-19 DIAGNOSIS — I47.29 OTHER VENTRICULAR TACHYCARDIA: ICD-10-CM

## 2025-09-19 PROCEDURE — 93283 PRGRMG EVAL IMPLANTABLE DFB: CPT

## 2025-09-19 PROCEDURE — 99214 OFFICE O/P EST MOD 30 MIN: CPT | Mod: 25

## 2025-09-19 PROCEDURE — 93290 INTERROG DEV EVAL ICPMS IP: CPT
